# Patient Record
Sex: FEMALE | Race: WHITE | HISPANIC OR LATINO | Employment: FULL TIME | URBAN - METROPOLITAN AREA
[De-identification: names, ages, dates, MRNs, and addresses within clinical notes are randomized per-mention and may not be internally consistent; named-entity substitution may affect disease eponyms.]

---

## 2020-10-29 ENCOUNTER — HOSPITAL ENCOUNTER (INPATIENT)
Age: 55
LOS: 34 days | Discharge: ANOTHER HOSPITAL | DRG: 004 | End: 2020-12-02
Attending: EMERGENCY MEDICINE | Admitting: INTERNAL MEDICINE

## 2020-10-29 ENCOUNTER — APPOINTMENT (OUTPATIENT)
Dept: GENERAL RADIOLOGY | Age: 55
DRG: 004 | End: 2020-10-29
Attending: EMERGENCY MEDICINE

## 2020-10-29 ENCOUNTER — APPOINTMENT (OUTPATIENT)
Dept: CT IMAGING | Age: 55
DRG: 004 | End: 2020-10-29
Attending: EMERGENCY MEDICINE

## 2020-10-29 DIAGNOSIS — R73.9 HYPERGLYCEMIA: ICD-10-CM

## 2020-10-29 DIAGNOSIS — J96.01 ACUTE RESPIRATORY FAILURE WITH HYPOXIA (CMD): ICD-10-CM

## 2020-10-29 DIAGNOSIS — E11.00 DM HYPEROSMOLARITY TYPE II, UNCONTROLLED (CMD): ICD-10-CM

## 2020-10-29 DIAGNOSIS — E11.65 DM HYPEROSMOLARITY TYPE II, UNCONTROLLED (CMD): ICD-10-CM

## 2020-10-29 DIAGNOSIS — Z20.822 SUSPECTED COVID-19 VIRUS INFECTION: ICD-10-CM

## 2020-10-29 DIAGNOSIS — R79.89 ABNORMAL LFTS: ICD-10-CM

## 2020-10-29 LAB
ABO + RH BLD: NORMAL
ALBUMIN SERPL-MCNC: 2.8 G/DL (ref 3.6–5.1)
ALBUMIN/GLOB SERPL: 0.5 {RATIO} (ref 1–2.4)
ALP SERPL-CCNC: 142 UNITS/L (ref 45–117)
ALT SERPL-CCNC: 17 UNITS/L
ANION GAP SERPL CALC-SCNC: 15 MMOL/L (ref 10–20)
AST SERPL-CCNC: 46 UNITS/L
BASOPHILS # BLD: 0 K/MCL (ref 0–0.3)
BASOPHILS NFR BLD: 0 %
BILIRUB SERPL-MCNC: 0.8 MG/DL (ref 0.2–1)
BLD GP AB SCN SERPL QL GEL: NEGATIVE
BLOOD BANK CMNT PATIENT-IMP: NORMAL
BUN SERPL-MCNC: 11 MG/DL (ref 6–20)
BUN/CREAT SERPL: 21 (ref 7–25)
CALCIUM SERPL-MCNC: 8.9 MG/DL (ref 8.4–10.2)
CHLORIDE SERPL-SCNC: 99 MMOL/L (ref 98–107)
CO2 SERPL-SCNC: 24 MMOL/L (ref 21–32)
CREAT SERPL-MCNC: 0.53 MG/DL (ref 0.51–0.95)
CROSSMATCH EXPIRE: NORMAL
D DIMER PPP FEU-MCNC: 1.07 MG/L (FEU)
DIFFERENTIAL METHOD BLD: ABNORMAL
EOSINOPHIL # BLD: 0 K/MCL (ref 0.1–0.5)
EOSINOPHIL NFR BLD: 0 %
ERYTHROCYTE [DISTWIDTH] IN BLOOD: 11.8 % (ref 11–15)
FERRITIN SERPL-MCNC: 818 NG/ML (ref 8–252)
GLOBULIN SER-MCNC: 5.3 G/DL (ref 2–4)
GLUCOSE BLDC GLUCOMTR-MCNC: 320 MG/DL (ref 70–99)
GLUCOSE BLDC GLUCOMTR-MCNC: 345 MG/DL (ref 70–99)
GLUCOSE BLDC GLUCOMTR-MCNC: 349 MG/DL (ref 70–99)
GLUCOSE BLDC GLUCOMTR-MCNC: 375 MG/DL (ref 70–99)
GLUCOSE SERPL-MCNC: 415 MG/DL (ref 65–99)
HCT VFR BLD CALC: 38 % (ref 36–46.5)
HGB BLD-MCNC: 12.8 G/DL (ref 12–15.5)
IMM GRANULOCYTES # BLD AUTO: 0.1 K/MCL (ref 0–0.2)
IMM GRANULOCYTES NFR BLD: 1 %
LACTATE BLDV-MCNC: 1.4 MMOL/L
LDH SERPL L TO P-CCNC: 487 UNITS/L (ref 82–240)
LYMPHOCYTES # BLD: 0.5 K/MCL (ref 1–4)
LYMPHOCYTES NFR BLD: 11 %
MCH RBC QN AUTO: 29 PG (ref 26–34)
MCHC RBC AUTO-ENTMCNC: 33.7 G/DL (ref 32–36.5)
MCV RBC AUTO: 86 FL (ref 78–100)
MONOCYTES # BLD: 0.3 K/MCL (ref 0.3–0.9)
MONOCYTES NFR BLD: 6 %
NEUTROPHILS # BLD: 4 K/MCL (ref 1.8–7.7)
NEUTROPHILS NFR BLD: 82 %
NRBC BLD MANUAL-RTO: 0 /100 WBC
NT-PROBNP SERPL-MCNC: 103 PG/ML
PLATELET # BLD: 214 K/MCL (ref 140–450)
POTASSIUM SERPL-SCNC: 3.9 MMOL/L (ref 3.4–5.1)
PROCALCITONIN SERPL IA-MCNC: 0.15 NG/ML
PROT SERPL-MCNC: 8.1 G/DL (ref 6.4–8.2)
RBC # BLD: 4.42 MIL/MCL (ref 4–5.2)
SODIUM SERPL-SCNC: 134 MMOL/L (ref 135–145)
TROPONIN I SERPL HS-MCNC: <0.02 NG/ML
WBC # BLD: 4.9 K/MCL (ref 4.2–11)

## 2020-10-29 PROCEDURE — U0003 INFECTIOUS AGENT DETECTION BY NUCLEIC ACID (DNA OR RNA); SEVERE ACUTE RESPIRATORY SYNDROME CORONAVIRUS 2 (SARS-COV-2) (CORONAVIRUS DISEASE [COVID-19]), AMPLIFIED PROBE TECHNIQUE, MAKING USE OF HIGH THROUGHPUT TECHNOLOGIES AS DESCRIBED BY CMS-2020-01-R: HCPCS

## 2020-10-29 PROCEDURE — 96361 HYDRATE IV INFUSION ADD-ON: CPT

## 2020-10-29 PROCEDURE — 85025 COMPLETE CBC W/AUTO DIFF WBC: CPT

## 2020-10-29 PROCEDURE — 71045 X-RAY EXAM CHEST 1 VIEW: CPT

## 2020-10-29 PROCEDURE — 10002019 HB COUNTER RESP ASSESSMENT

## 2020-10-29 PROCEDURE — 87040 BLOOD CULTURE FOR BACTERIA: CPT

## 2020-10-29 PROCEDURE — 99223 1ST HOSP IP/OBS HIGH 75: CPT | Performed by: INTERNAL MEDICINE

## 2020-10-29 PROCEDURE — 87899 AGENT NOS ASSAY W/OPTIC: CPT

## 2020-10-29 PROCEDURE — 10004281 HB COUNTER-STAFF TIME PER 15 MIN

## 2020-10-29 PROCEDURE — 84145 PROCALCITONIN (PCT): CPT

## 2020-10-29 PROCEDURE — 10002805 HB CONTRAST AGENT: Performed by: EMERGENCY MEDICINE

## 2020-10-29 PROCEDURE — 83615 LACTATE (LD) (LDH) ENZYME: CPT

## 2020-10-29 PROCEDURE — 13003292 HB HHF OXYGEN THERAPY DAILY

## 2020-10-29 PROCEDURE — 82728 ASSAY OF FERRITIN: CPT

## 2020-10-29 PROCEDURE — 10002800 HB RX 250 W HCPCS: Performed by: INTERNAL MEDICINE

## 2020-10-29 PROCEDURE — 96374 THER/PROPH/DIAG INJ IV PUSH: CPT

## 2020-10-29 PROCEDURE — 84484 ASSAY OF TROPONIN QUANT: CPT

## 2020-10-29 PROCEDURE — 10002807 HB RX 258: Performed by: INTERNAL MEDICINE

## 2020-10-29 PROCEDURE — 80053 COMPREHEN METABOLIC PANEL: CPT

## 2020-10-29 PROCEDURE — 10004651 HB RX, NO CHARGE ITEM: Performed by: INTERNAL MEDICINE

## 2020-10-29 PROCEDURE — 82962 GLUCOSE BLOOD TEST: CPT

## 2020-10-29 PROCEDURE — 83605 ASSAY OF LACTIC ACID: CPT

## 2020-10-29 PROCEDURE — 85379 FIBRIN DEGRADATION QUANT: CPT

## 2020-10-29 PROCEDURE — 93005 ELECTROCARDIOGRAM TRACING: CPT | Performed by: EMERGENCY MEDICINE

## 2020-10-29 PROCEDURE — 10002803 HB RX 637: Performed by: INTERNAL MEDICINE

## 2020-10-29 PROCEDURE — XW033E5 INTRODUCTION OF REMDESIVIR ANTI-INFECTIVE INTO PERIPHERAL VEIN, PERCUTANEOUS APPROACH, NEW TECHNOLOGY GROUP 5: ICD-10-PCS | Performed by: INTERNAL MEDICINE

## 2020-10-29 PROCEDURE — 10002800 HB RX 250 W HCPCS: Performed by: EMERGENCY MEDICINE

## 2020-10-29 PROCEDURE — 99285 EMERGENCY DEPT VISIT HI MDM: CPT

## 2020-10-29 PROCEDURE — C9803 HOPD COVID-19 SPEC COLLECT: HCPCS

## 2020-10-29 PROCEDURE — 82306 VITAMIN D 25 HYDROXY: CPT

## 2020-10-29 PROCEDURE — 10002807 HB RX 258: Performed by: EMERGENCY MEDICINE

## 2020-10-29 PROCEDURE — 83880 ASSAY OF NATRIURETIC PEPTIDE: CPT

## 2020-10-29 PROCEDURE — 86900 BLOOD TYPING SEROLOGIC ABO: CPT

## 2020-10-29 PROCEDURE — 71275 CT ANGIOGRAPHY CHEST: CPT

## 2020-10-29 PROCEDURE — 10003585 HB ROOM CHARGE INTERMEDIATE CARE

## 2020-10-29 RX ORDER — DEXTROSE MONOHYDRATE 50 MG/ML
INJECTION, SOLUTION INTRAVENOUS CONTINUOUS PRN
Status: DISCONTINUED | OUTPATIENT
Start: 2020-10-29 | End: 2020-11-21

## 2020-10-29 RX ORDER — POTASSIUM CHLORIDE 20 MEQ/1
40 TABLET, EXTENDED RELEASE ORAL ONCE
Status: COMPLETED | OUTPATIENT
Start: 2020-10-29 | End: 2020-10-29

## 2020-10-29 RX ORDER — DEXAMETHASONE SODIUM PHOSPHATE 4 MG/ML
6 INJECTION, SOLUTION INTRA-ARTICULAR; INTRALESIONAL; INTRAMUSCULAR; INTRAVENOUS; SOFT TISSUE ONCE
Status: COMPLETED | OUTPATIENT
Start: 2020-10-29 | End: 2020-10-29

## 2020-10-29 RX ORDER — 0.9 % SODIUM CHLORIDE 0.9 %
2 VIAL (ML) INJECTION EVERY 12 HOURS SCHEDULED
Status: DISCONTINUED | OUTPATIENT
Start: 2020-10-29 | End: 2020-12-02 | Stop reason: HOSPADM

## 2020-10-29 RX ORDER — FAMOTIDINE 20 MG/1
20 TABLET, FILM COATED ORAL EVERY 12 HOURS SCHEDULED
Status: DISCONTINUED | OUTPATIENT
Start: 2020-10-29 | End: 2020-11-08

## 2020-10-29 RX ORDER — TRAMADOL HYDROCHLORIDE 50 MG/1
50 TABLET ORAL EVERY 6 HOURS PRN
Status: DISCONTINUED | OUTPATIENT
Start: 2020-10-29 | End: 2020-11-19

## 2020-10-29 RX ORDER — PROCHLORPERAZINE MALEATE 5 MG/1
5 TABLET ORAL EVERY 4 HOURS PRN
Status: DISCONTINUED | OUTPATIENT
Start: 2020-10-29 | End: 2020-11-19

## 2020-10-29 RX ORDER — ENOXAPARIN SODIUM 100 MG/ML
40 INJECTION SUBCUTANEOUS DAILY
Status: DISCONTINUED | OUTPATIENT
Start: 2020-10-29 | End: 2020-11-01

## 2020-10-29 RX ORDER — ONDANSETRON 2 MG/ML
4 INJECTION INTRAMUSCULAR; INTRAVENOUS 2 TIMES DAILY PRN
Status: DISCONTINUED | OUTPATIENT
Start: 2020-10-29 | End: 2020-12-02 | Stop reason: HOSPADM

## 2020-10-29 RX ORDER — NICOTINE POLACRILEX 4 MG
15 LOZENGE BUCCAL PRN
Status: DISCONTINUED | OUTPATIENT
Start: 2020-10-29 | End: 2020-11-21

## 2020-10-29 RX ORDER — BISACODYL 10 MG
10 SUPPOSITORY, RECTAL RECTAL DAILY PRN
Status: DISCONTINUED | OUTPATIENT
Start: 2020-10-29 | End: 2020-12-02 | Stop reason: HOSPADM

## 2020-10-29 RX ORDER — INSULIN LISPRO 100 [IU]/ML
7 INJECTION, SOLUTION INTRAVENOUS; SUBCUTANEOUS ONCE
Status: COMPLETED | OUTPATIENT
Start: 2020-10-29 | End: 2020-10-29

## 2020-10-29 RX ORDER — DEXTROSE MONOHYDRATE 25 G/50ML
25 INJECTION, SOLUTION INTRAVENOUS PRN
Status: DISCONTINUED | OUTPATIENT
Start: 2020-10-29 | End: 2020-11-21

## 2020-10-29 RX ORDER — POLYETHYLENE GLYCOL 3350 17 G/17G
17 POWDER, FOR SOLUTION ORAL DAILY PRN
Status: DISCONTINUED | OUTPATIENT
Start: 2020-10-29 | End: 2020-11-07

## 2020-10-29 RX ORDER — NICOTINE POLACRILEX 4 MG
30 LOZENGE BUCCAL PRN
Status: DISCONTINUED | OUTPATIENT
Start: 2020-10-29 | End: 2020-11-21

## 2020-10-29 RX ORDER — ACETAMINOPHEN 325 MG/1
650 TABLET ORAL EVERY 4 HOURS PRN
Status: DISCONTINUED | OUTPATIENT
Start: 2020-10-29 | End: 2020-11-19

## 2020-10-29 RX ORDER — INSULIN LISPRO 100 [IU]/ML
5 INJECTION, SOLUTION INTRAVENOUS; SUBCUTANEOUS ONCE
Status: COMPLETED | OUTPATIENT
Start: 2020-10-29 | End: 2020-10-29

## 2020-10-29 RX ORDER — AMOXICILLIN 250 MG
2 CAPSULE ORAL DAILY PRN
Status: DISCONTINUED | OUTPATIENT
Start: 2020-10-29 | End: 2020-11-19

## 2020-10-29 RX ORDER — ALBUTEROL SULFATE 2.5 MG/3ML
2.5 SOLUTION RESPIRATORY (INHALATION)
Status: DISCONTINUED | OUTPATIENT
Start: 2020-10-29 | End: 2020-10-29

## 2020-10-29 RX ORDER — DEXAMETHASONE SODIUM PHOSPHATE 4 MG/ML
6 INJECTION, SOLUTION INTRA-ARTICULAR; INTRALESIONAL; INTRAMUSCULAR; INTRAVENOUS; SOFT TISSUE DAILY
Status: COMPLETED | OUTPATIENT
Start: 2020-10-30 | End: 2020-11-07

## 2020-10-29 RX ORDER — MAGNESIUM HYDROXIDE/ALUMINUM HYDROXICE/SIMETHICONE 120; 1200; 1200 MG/30ML; MG/30ML; MG/30ML
30 SUSPENSION ORAL EVERY 4 HOURS PRN
Status: DISCONTINUED | OUTPATIENT
Start: 2020-10-29 | End: 2020-11-19

## 2020-10-29 RX ORDER — DEXTROSE MONOHYDRATE 25 G/50ML
12.5 INJECTION, SOLUTION INTRAVENOUS PRN
Status: DISCONTINUED | OUTPATIENT
Start: 2020-10-29 | End: 2020-11-21

## 2020-10-29 RX ORDER — ALBUTEROL SULFATE 90 UG/1
2 AEROSOL, METERED RESPIRATORY (INHALATION)
Status: DISCONTINUED | OUTPATIENT
Start: 2020-10-29 | End: 2020-11-25

## 2020-10-29 RX ADMIN — SODIUM CHLORIDE 25 ML: 0.9 INJECTION, SOLUTION INTRAVENOUS at 17:15

## 2020-10-29 RX ADMIN — INSULIN LISPRO 7 UNITS: 100 INJECTION, SOLUTION INTRAVENOUS; SUBCUTANEOUS at 15:54

## 2020-10-29 RX ADMIN — FAMOTIDINE 20 MG: 20 TABLET, FILM COATED ORAL at 20:51

## 2020-10-29 RX ADMIN — DEXAMETHASONE SODIUM PHOSPHATE 6 MG: 4 INJECTION, SOLUTION INTRAMUSCULAR; INTRAVENOUS at 11:38

## 2020-10-29 RX ADMIN — INSULIN LISPRO 4 UNITS: 100 INJECTION, SOLUTION INTRAVENOUS; SUBCUTANEOUS at 16:42

## 2020-10-29 RX ADMIN — SODIUM CHLORIDE, PRESERVATIVE FREE 2 ML: 5 INJECTION INTRAVENOUS at 20:51

## 2020-10-29 RX ADMIN — AZITHROMYCIN DIHYDRATE 500 MG: 500 INJECTION, POWDER, LYOPHILIZED, FOR SOLUTION INTRAVENOUS at 18:37

## 2020-10-29 RX ADMIN — INSULIN LISPRO 5 UNITS: 100 INJECTION, SOLUTION INTRAVENOUS; SUBCUTANEOUS at 22:30

## 2020-10-29 RX ADMIN — POTASSIUM CHLORIDE 40 MEQ: 1500 TABLET, EXTENDED RELEASE ORAL at 17:16

## 2020-10-29 RX ADMIN — SODIUM CHLORIDE, SODIUM LACTATE, POTASSIUM CHLORIDE, AND CALCIUM CHLORIDE 1000 ML: .6; .31; .03; .02 INJECTION, SOLUTION INTRAVENOUS at 11:38

## 2020-10-29 RX ADMIN — IOHEXOL 100 ML: 350 INJECTION, SOLUTION INTRAVENOUS at 12:00

## 2020-10-29 RX ADMIN — ENOXAPARIN SODIUM 40 MG: 40 INJECTION SUBCUTANEOUS at 16:00

## 2020-10-29 RX ADMIN — SODIUM CHLORIDE, PRESERVATIVE FREE 2 ML: 5 INJECTION INTRAVENOUS at 15:52

## 2020-10-29 ASSESSMENT — COGNITIVE AND FUNCTIONAL STATUS - GENERAL
ARE YOU BLIND OR DO YOU HAVE SERIOUS DIFFICULTY SEEING, EVEN WHEN WEARING GLASSES: NO
DO YOU HAVE DIFFICULTY DRESSING OR BATHING: NO
ARE YOU DEAF OR DO YOU HAVE SERIOUS DIFFICULTY  HEARING: NO
BECAUSE OF A PHYSICAL, MENTAL, OR EMOTIONAL CONDITION, DO YOU HAVE SERIOUS DIFFICULTY CONCENTRATING, REMEMBERING OR MAKING DECISIONS: NO
DO YOU HAVE SERIOUS DIFFICULTY WALKING OR CLIMBING STAIRS: NO
BECAUSE OF A PHYSICAL, MENTAL, OR EMOTIONAL CONDITION, DO YOU HAVE DIFFICULTY DOING ERRANDS ALONE: NO

## 2020-10-29 ASSESSMENT — ENCOUNTER SYMPTOMS
FATIGUE: 1
BRUISES/BLEEDS EASILY: 0
ACTIVITY CHANGE: 1
CONFUSION: 0
NAUSEA: 0
ADENOPATHY: 0
BACK PAIN: 0
ABDOMINAL PAIN: 0
DIAPHORESIS: 1
COUGH: 1
EYES NEGATIVE: 1
FEVER: 1
GASTROINTESTINAL NEGATIVE: 1
NEUROLOGICAL NEGATIVE: 1
DIARRHEA: 0
WOUND: 0
PSYCHIATRIC NEGATIVE: 1
WEAKNESS: 0
VOMITING: 0
SHORTNESS OF BREATH: 1
HEADACHES: 0

## 2020-10-29 ASSESSMENT — PATIENT HEALTH QUESTIONNAIRE - PHQ9
IS PATIENT ABLE TO COMPLETE PHQ2 OR PHQ9: YES
SUM OF ALL RESPONSES TO PHQ9 QUESTIONS 1 AND 2: 0
2. FEELING DOWN, DEPRESSED OR HOPELESS: NOT AT ALL
SUM OF ALL RESPONSES TO PHQ9 QUESTIONS 1 AND 2: 0
CLINICAL INTERPRETATION OF PHQ2 SCORE: NO FURTHER SCREENING NEEDED
1. LITTLE INTEREST OR PLEASURE IN DOING THINGS: NOT AT ALL
CLINICAL INTERPRETATION OF PHQ9 SCORE: NO FURTHER SCREENING NEEDED

## 2020-10-29 ASSESSMENT — LIFESTYLE VARIABLES
AUDIT-C TOTAL SCORE: 1
HOW OFTEN DO YOU HAVE 6 OR MORE DRINKS ON ONE OCCASION: NEVER
ALCOHOL_USE_STATUS: NO OR LOW RISK WITH VALIDATED TOOL
HOW MANY STANDARD DRINKS CONTAINING ALCOHOL DO YOU HAVE ON A TYPICAL DAY: 0,1 OR 2
HOW OFTEN DO YOU HAVE A DRINK CONTAINING ALCOHOL: MONTHLY OR LESS

## 2020-10-29 ASSESSMENT — PAIN SCALES - GENERAL
PAINLEVEL_OUTOF10: 0

## 2020-10-29 ASSESSMENT — PULMONARY FUNCTION TESTS
FEV1: 0
FEV1: 0
FEV1_PREDICTED: 0
FEV1/FVC: UNABLE TO OBTAIN, OR GREATER THAN 70%
FEV1_PREDICTED: 0
FEV1: 0

## 2020-10-29 ASSESSMENT — ACTIVITIES OF DAILY LIVING (ADL)
RECENT_DECLINE_ADL: NO
CHRONIC_PAIN_PRESENT: NO
ADL_BEFORE_ADMISSION: INDEPENDENT
ADL_SCORE: 12
ADL_SHORT_OF_BREATH: YES

## 2020-10-29 ASSESSMENT — COLUMBIA-SUICIDE SEVERITY RATING SCALE - C-SSRS
6. HAVE YOU EVER DONE ANYTHING, STARTED TO DO ANYTHING, OR PREPARED TO DO ANYTHING TO END YOUR LIFE?: NO
1. WITHIN THE PAST MONTH, HAVE YOU WISHED YOU WERE DEAD OR WISHED YOU COULD GO TO SLEEP AND NOT WAKE UP?: NO
IS THE PATIENT ABLE TO COMPLETE C-SSRS: YES
2. HAVE YOU ACTUALLY HAD ANY THOUGHTS OF KILLING YOURSELF?: NO

## 2020-10-30 ENCOUNTER — CASE MANAGEMENT (OUTPATIENT)
Dept: CARE COORDINATION | Age: 55
End: 2020-10-30

## 2020-10-30 LAB
25(OH)D3+25(OH)D2 SERPL-MCNC: 22.5 NG/ML (ref 30–100)
ATRIAL RATE (BPM): 99
FERRITIN SERPL-MCNC: 904 NG/ML (ref 8–252)
GLUCOSE BLDC GLUCOMTR-MCNC: 253 MG/DL (ref 70–99)
GLUCOSE BLDC GLUCOMTR-MCNC: 255 MG/DL (ref 70–99)
GLUCOSE BLDC GLUCOMTR-MCNC: 328 MG/DL (ref 70–99)
GLUCOSE BLDC GLUCOMTR-MCNC: 330 MG/DL (ref 70–99)
GLUCOSE BLDC GLUCOMTR-MCNC: 344 MG/DL (ref 70–99)
L PNEUMO1 AG UR QL IA: NORMAL
P AXIS (DEGREES): 31
PR-INTERVAL (MSEC): 178
QRS-INTERVAL (MSEC): 70
QT-INTERVAL (MSEC): 364
QTC: 467
R AXIS (DEGREES): 1
REPORT STATUS (RPT): NORMAL
REPORT TEXT: NORMAL
SARS-COV-2 RNA RESP QL NAA+PROBE: DETECTED
SPECIMEN SOURCE: ABNORMAL
SPECIMEN SOURCE: NORMAL
T AXIS (DEGREES): 58
VENTRICULAR RATE EKG/MIN (BPM): 99

## 2020-10-30 PROCEDURE — 83615 LACTATE (LD) (LDH) ENZYME: CPT

## 2020-10-30 PROCEDURE — P9059 PLASMA, FRZ BETWEEN 8-24HOUR: HCPCS

## 2020-10-30 PROCEDURE — 10004651 HB RX, NO CHARGE ITEM: Performed by: INTERNAL MEDICINE

## 2020-10-30 PROCEDURE — 10004281 HB COUNTER-STAFF TIME PER 15 MIN

## 2020-10-30 PROCEDURE — 10002803 HB RX 637: Performed by: INTERNAL MEDICINE

## 2020-10-30 PROCEDURE — 10003585 HB ROOM CHARGE INTERMEDIATE CARE

## 2020-10-30 PROCEDURE — 10002807 HB RX 258: Performed by: INTERNAL MEDICINE

## 2020-10-30 PROCEDURE — XW13325 TRANSFUSION OF CONVALESCENT PLASMA (NONAUTOLOGOUS) INTO PERIPHERAL VEIN, PERCUTANEOUS APPROACH, NEW TECHNOLOGY GROUP 5: ICD-10-PCS | Performed by: INTERNAL MEDICINE

## 2020-10-30 PROCEDURE — XW033H5 INTRODUCTION OF TOCILIZUMAB INTO PERIPHERAL VEIN, PERCUTANEOUS APPROACH, NEW TECHNOLOGY GROUP 5: ICD-10-PCS | Performed by: INTERNAL MEDICINE

## 2020-10-30 PROCEDURE — 84460 ALANINE AMINO (ALT) (SGPT): CPT

## 2020-10-30 PROCEDURE — 82306 VITAMIN D 25 HYDROXY: CPT

## 2020-10-30 PROCEDURE — 85379 FIBRIN DEGRADATION QUANT: CPT

## 2020-10-30 PROCEDURE — 85025 COMPLETE CBC W/AUTO DIFF WBC: CPT

## 2020-10-30 PROCEDURE — 86480 TB TEST CELL IMMUN MEASURE: CPT

## 2020-10-30 PROCEDURE — 85610 PROTHROMBIN TIME: CPT

## 2020-10-30 PROCEDURE — 83036 HEMOGLOBIN GLYCOSYLATED A1C: CPT

## 2020-10-30 PROCEDURE — 82728 ASSAY OF FERRITIN: CPT

## 2020-10-30 PROCEDURE — C9399 UNCLASSIFIED DRUGS OR BIOLOG: HCPCS | Performed by: INTERNAL MEDICINE

## 2020-10-30 PROCEDURE — 36415 COLL VENOUS BLD VENIPUNCTURE: CPT

## 2020-10-30 PROCEDURE — 10002800 HB RX 250 W HCPCS: Performed by: INTERNAL MEDICINE

## 2020-10-30 PROCEDURE — 80048 BASIC METABOLIC PNL TOTAL CA: CPT

## 2020-10-30 PROCEDURE — 83520 IMMUNOASSAY QUANT NOS NONAB: CPT

## 2020-10-30 PROCEDURE — 99233 SBSQ HOSP IP/OBS HIGH 50: CPT | Performed by: INTERNAL MEDICINE

## 2020-10-30 PROCEDURE — 86140 C-REACTIVE PROTEIN: CPT

## 2020-10-30 PROCEDURE — 13003292 HB HHF OXYGEN THERAPY DAILY

## 2020-10-30 RX ORDER — SODIUM CHLORIDE 9 MG/ML
INJECTION, SOLUTION INTRAVENOUS CONTINUOUS PRN
Status: DISCONTINUED | OUTPATIENT
Start: 2020-10-30 | End: 2020-12-02

## 2020-10-30 RX ORDER — INSULIN LISPRO 100 [IU]/ML
7 INJECTION, SOLUTION INTRAVENOUS; SUBCUTANEOUS ONCE
Status: COMPLETED | OUTPATIENT
Start: 2020-10-30 | End: 2020-10-30

## 2020-10-30 RX ORDER — VITAMIN B COMPLEX
50 TABLET ORAL DAILY
Status: DISCONTINUED | OUTPATIENT
Start: 2020-10-30 | End: 2020-10-30

## 2020-10-30 RX ORDER — INSULIN GLARGINE 100 [IU]/ML
10 INJECTION, SOLUTION SUBCUTANEOUS DAILY
Status: DISCONTINUED | OUTPATIENT
Start: 2020-10-30 | End: 2020-10-31

## 2020-10-30 RX ORDER — ERGOCALCIFEROL 1.25 MG/1
1.25 CAPSULE ORAL ONCE
Status: COMPLETED | OUTPATIENT
Start: 2020-10-30 | End: 2020-10-30

## 2020-10-30 RX ADMIN — AZITHROMYCIN DIHYDRATE 500 MG: 500 INJECTION, POWDER, LYOPHILIZED, FOR SOLUTION INTRAVENOUS at 17:03

## 2020-10-30 RX ADMIN — INSULIN LISPRO 8 UNITS: 100 INJECTION, SOLUTION INTRAVENOUS; SUBCUTANEOUS at 13:03

## 2020-10-30 RX ADMIN — ENOXAPARIN SODIUM 40 MG: 40 INJECTION SUBCUTANEOUS at 16:56

## 2020-10-30 RX ADMIN — FAMOTIDINE 20 MG: 20 TABLET, FILM COATED ORAL at 08:24

## 2020-10-30 RX ADMIN — INSULIN GLARGINE 10 UNITS: 100 INJECTION, SOLUTION SUBCUTANEOUS at 15:10

## 2020-10-30 RX ADMIN — SODIUM CHLORIDE, PRESERVATIVE FREE 2 ML: 5 INJECTION INTRAVENOUS at 08:25

## 2020-10-30 RX ADMIN — INSULIN LISPRO 7 UNITS: 100 INJECTION, SOLUTION INTRAVENOUS; SUBCUTANEOUS at 21:34

## 2020-10-30 RX ADMIN — INSULIN LISPRO 7 UNITS: 100 INJECTION, SOLUTION INTRAVENOUS; SUBCUTANEOUS at 08:24

## 2020-10-30 RX ADMIN — ERGOCALCIFEROL 1.25 MG: 1.25 CAPSULE ORAL at 13:03

## 2020-10-30 RX ADMIN — INSULIN LISPRO 8 UNITS: 100 INJECTION, SOLUTION INTRAVENOUS; SUBCUTANEOUS at 18:02

## 2020-10-30 RX ADMIN — REMDESIVIR 100 MG: 100 INJECTION, POWDER, LYOPHILIZED, FOR SOLUTION INTRAVENOUS at 18:04

## 2020-10-30 RX ADMIN — FAMOTIDINE 20 MG: 20 TABLET, FILM COATED ORAL at 21:34

## 2020-10-30 RX ADMIN — DEXAMETHASONE SODIUM PHOSPHATE 6 MG: 4 INJECTION, SOLUTION INTRAMUSCULAR; INTRAVENOUS at 08:24

## 2020-10-30 ASSESSMENT — PAIN SCALES - GENERAL
PAINLEVEL_OUTOF10: 0
PAINLEVEL_OUTOF10: 0

## 2020-10-31 PROBLEM — E11.65 DM HYPEROSMOLARITY TYPE II, UNCONTROLLED (CMD): Status: ACTIVE | Noted: 2020-10-31

## 2020-10-31 PROBLEM — E11.00 DM HYPEROSMOLARITY TYPE II, UNCONTROLLED (CMD): Status: ACTIVE | Noted: 2020-10-31

## 2020-10-31 LAB
25(OH)D3+25(OH)D2 SERPL-MCNC: 23.1 NG/ML (ref 30–100)
ALT SERPL-CCNC: 14 UNITS/L
ANION GAP SERPL CALC-SCNC: 11 MMOL/L (ref 10–20)
ANNOTATION COMMENT IMP: ABNORMAL
BASOPHILS # BLD: 0 K/MCL (ref 0–0.3)
BASOPHILS NFR BLD: 0 %
BUN SERPL-MCNC: 15 MG/DL (ref 6–20)
BUN/CREAT SERPL: 38 (ref 7–25)
CALCIUM SERPL-MCNC: 8.7 MG/DL (ref 8.4–10.2)
CHLORIDE SERPL-SCNC: 102 MMOL/L (ref 98–107)
CO2 SERPL-SCNC: 30 MMOL/L (ref 21–32)
CREAT SERPL-MCNC: 0.4 MG/DL (ref 0.51–0.95)
DIFFERENTIAL METHOD BLD: ABNORMAL
EOSINOPHIL # BLD: 0 K/MCL (ref 0.1–0.5)
EOSINOPHIL NFR BLD: 0 %
ERYTHROCYTE [DISTWIDTH] IN BLOOD: 11.8 % (ref 11–15)
GLUCOSE BLDC GLUCOMTR-MCNC: 241 MG/DL (ref 70–99)
GLUCOSE BLDC GLUCOMTR-MCNC: 257 MG/DL (ref 70–99)
GLUCOSE BLDC GLUCOMTR-MCNC: 278 MG/DL (ref 70–99)
GLUCOSE BLDC GLUCOMTR-MCNC: 320 MG/DL (ref 70–99)
GLUCOSE SERPL-MCNC: 249 MG/DL (ref 65–99)
HBV CORE IGG+IGM SER QL: NEGATIVE
HBV SURFACE AB SER QL: POSITIVE
HBV SURFACE AG SER QL: NEGATIVE
HCT VFR BLD CALC: 33.4 % (ref 36–46.5)
HCV AB SER QL: NEGATIVE
HGB BLD-MCNC: 11.3 G/DL (ref 12–15.5)
LYMPHOCYTES # BLD: 0.6 K/MCL (ref 1–4)
LYMPHOCYTES NFR BLD: 8 %
MCH RBC QN AUTO: 29.6 PG (ref 26–34)
MCHC RBC AUTO-ENTMCNC: 33.8 G/DL (ref 32–36.5)
MCV RBC AUTO: 87.4 FL (ref 78–100)
MONOCYTES # BLD: 0.2 K/MCL (ref 0.3–0.9)
MONOCYTES NFR BLD: 4 %
NEUTROPHILS # BLD: 3.8 K/MCL (ref 1.8–7.7)
NEUTS BAND NFR BLD: 2 % (ref 0–10)
NEUTS SEG NFR BLD: 81 %
NRBC BLD MANUAL-RTO: 0 /100 WBC
PLAT MORPH BLD: NORMAL
PLATELET # BLD: 226 K/MCL (ref 140–450)
POTASSIUM SERPL-SCNC: 3.8 MMOL/L (ref 3.4–5.1)
RBC # BLD: 3.82 MIL/MCL (ref 4–5.2)
RBC MORPH BLD: NORMAL
SODIUM SERPL-SCNC: 139 MMOL/L (ref 135–145)
VARIANT LYMPHS NFR BLD: 5 % (ref 0–5)
WBC # BLD: 4.6 K/MCL (ref 4.2–11)
WBC MORPH BLD: ABNORMAL

## 2020-10-31 PROCEDURE — 10002807 HB RX 258: Performed by: INTERNAL MEDICINE

## 2020-10-31 PROCEDURE — 99233 SBSQ HOSP IP/OBS HIGH 50: CPT | Performed by: INTERNAL MEDICINE

## 2020-10-31 PROCEDURE — 86704 HEP B CORE ANTIBODY TOTAL: CPT

## 2020-10-31 PROCEDURE — 10004651 HB RX, NO CHARGE ITEM: Performed by: INTERNAL MEDICINE

## 2020-10-31 PROCEDURE — 13003292 HB HHF OXYGEN THERAPY DAILY

## 2020-10-31 PROCEDURE — 10002800 HB RX 250 W HCPCS: Performed by: INTERNAL MEDICINE

## 2020-10-31 PROCEDURE — 84460 ALANINE AMINO (ALT) (SGPT): CPT

## 2020-10-31 PROCEDURE — 10004281 HB COUNTER-STAFF TIME PER 15 MIN

## 2020-10-31 PROCEDURE — C9399 UNCLASSIFIED DRUGS OR BIOLOG: HCPCS | Performed by: INTERNAL MEDICINE

## 2020-10-31 PROCEDURE — 10003585 HB ROOM CHARGE INTERMEDIATE CARE

## 2020-10-31 PROCEDURE — 85025 COMPLETE CBC W/AUTO DIFF WBC: CPT

## 2020-10-31 PROCEDURE — 36415 COLL VENOUS BLD VENIPUNCTURE: CPT

## 2020-10-31 PROCEDURE — 80048 BASIC METABOLIC PNL TOTAL CA: CPT

## 2020-10-31 PROCEDURE — 10002803 HB RX 637: Performed by: INTERNAL MEDICINE

## 2020-10-31 RX ORDER — INSULIN GLARGINE 100 [IU]/ML
20 INJECTION, SOLUTION SUBCUTANEOUS DAILY
Status: DISCONTINUED | OUTPATIENT
Start: 2020-10-31 | End: 2020-11-06

## 2020-10-31 RX ORDER — POTASSIUM CHLORIDE 20 MEQ/1
40 TABLET, EXTENDED RELEASE ORAL ONCE
Status: COMPLETED | OUTPATIENT
Start: 2020-10-31 | End: 2020-10-31

## 2020-10-31 RX ADMIN — AZITHROMYCIN DIHYDRATE 500 MG: 500 INJECTION, POWDER, LYOPHILIZED, FOR SOLUTION INTRAVENOUS at 18:50

## 2020-10-31 RX ADMIN — DEXAMETHASONE SODIUM PHOSPHATE 6 MG: 4 INJECTION, SOLUTION INTRAMUSCULAR; INTRAVENOUS at 08:29

## 2020-10-31 RX ADMIN — SODIUM CHLORIDE, PRESERVATIVE FREE 2 ML: 5 INJECTION INTRAVENOUS at 21:04

## 2020-10-31 RX ADMIN — REMDESIVIR 100 MG: 100 INJECTION, POWDER, LYOPHILIZED, FOR SOLUTION INTRAVENOUS at 17:40

## 2020-10-31 RX ADMIN — INSULIN LISPRO 9 UNITS: 100 INJECTION, SOLUTION INTRAVENOUS; SUBCUTANEOUS at 08:29

## 2020-10-31 RX ADMIN — POTASSIUM CHLORIDE 40 MEQ: 1500 TABLET, EXTENDED RELEASE ORAL at 08:28

## 2020-10-31 RX ADMIN — INSULIN LISPRO 10 UNITS: 100 INJECTION, SOLUTION INTRAVENOUS; SUBCUTANEOUS at 12:36

## 2020-10-31 RX ADMIN — ENOXAPARIN SODIUM 40 MG: 40 INJECTION SUBCUTANEOUS at 17:40

## 2020-10-31 RX ADMIN — INSULIN LISPRO 11 UNITS: 100 INJECTION, SOLUTION INTRAVENOUS; SUBCUTANEOUS at 17:40

## 2020-10-31 RX ADMIN — INSULIN GLARGINE 20 UNITS: 100 INJECTION, SOLUTION SUBCUTANEOUS at 10:43

## 2020-10-31 RX ADMIN — TOCILIZUMAB 400 MG: 20 INJECTION, SOLUTION, CONCENTRATE INTRAVENOUS at 01:59

## 2020-10-31 RX ADMIN — SODIUM CHLORIDE, PRESERVATIVE FREE 2 ML: 5 INJECTION INTRAVENOUS at 08:36

## 2020-10-31 RX ADMIN — FAMOTIDINE 20 MG: 20 TABLET, FILM COATED ORAL at 08:28

## 2020-10-31 RX ADMIN — FAMOTIDINE 20 MG: 20 TABLET, FILM COATED ORAL at 21:04

## 2020-10-31 ASSESSMENT — PAIN SCALES - GENERAL
PAINLEVEL_OUTOF10: 0
PAINLEVEL_OUTOF10: 0

## 2020-11-01 LAB
ALT SERPL-CCNC: 14 UNITS/L
ANION GAP SERPL CALC-SCNC: 10 MMOL/L (ref 10–20)
BASOPHILS # BLD: 0 K/MCL (ref 0–0.3)
BASOPHILS NFR BLD: 1 %
BLD PROD TYP BPU: NORMAL
BLD PROD TYP BPU: NORMAL
BLD UNIT ID BPU: NORMAL
BLD UNIT ID BPU: NORMAL
BUN SERPL-MCNC: 16 MG/DL (ref 6–20)
BUN/CREAT SERPL: 37 (ref 7–25)
CALCIUM SERPL-MCNC: 8.6 MG/DL (ref 8.4–10.2)
CHLORIDE SERPL-SCNC: 101 MMOL/L (ref 98–107)
CO2 SERPL-SCNC: 31 MMOL/L (ref 21–32)
CREAT SERPL-MCNC: 0.44 MG/DL (ref 0.51–0.95)
CRP SERPL-MCNC: 6.2 MG/DL
D DIMER PPP FEU-MCNC: 2.04 MG/L (FEU)
DIFFERENTIAL METHOD BLD: ABNORMAL
EOSINOPHIL # BLD: 0 K/MCL (ref 0.1–0.5)
EOSINOPHIL NFR BLD: 0 %
ERYTHROCYTE [DISTWIDTH] IN BLOOD: 11.7 % (ref 11–15)
FERRITIN SERPL-MCNC: 786 NG/ML (ref 8–252)
GLUCOSE BLDC GLUCOMTR-MCNC: 135 MG/DL (ref 70–99)
GLUCOSE BLDC GLUCOMTR-MCNC: 156 MG/DL (ref 70–99)
GLUCOSE BLDC GLUCOMTR-MCNC: 195 MG/DL (ref 70–99)
GLUCOSE BLDC GLUCOMTR-MCNC: 266 MG/DL (ref 70–99)
GLUCOSE SERPL-MCNC: 140 MG/DL (ref 65–99)
HCT VFR BLD CALC: 37.8 % (ref 36–46.5)
HGB BLD-MCNC: 12.9 G/DL (ref 12–15.5)
IMM GRANULOCYTES # BLD AUTO: 0.1 K/MCL (ref 0–0.2)
IMM GRANULOCYTES NFR BLD: 3 %
INR PPP: 1
LDH SERPL L TO P-CCNC: 625 UNITS/L (ref 82–240)
LYMPHOCYTES # BLD: 1.4 K/MCL (ref 1–4)
LYMPHOCYTES NFR BLD: 30 %
MCH RBC QN AUTO: 29.6 PG (ref 26–34)
MCHC RBC AUTO-ENTMCNC: 34.1 G/DL (ref 32–36.5)
MCV RBC AUTO: 86.7 FL (ref 78–100)
MONOCYTES # BLD: 0.4 K/MCL (ref 0.3–0.9)
MONOCYTES NFR BLD: 7 %
NEUTROPHILS # BLD: 2.8 K/MCL (ref 1.8–7.7)
NEUTROPHILS NFR BLD: 59 %
NRBC BLD MANUAL-RTO: 0 /100 WBC
NUM BPU REQUESTED: 2
PLATELET # BLD: 250 K/MCL (ref 140–450)
POTASSIUM SERPL-SCNC: 3.5 MMOL/L (ref 3.4–5.1)
POTASSIUM SERPL-SCNC: 4.4 MMOL/L (ref 3.4–5.1)
PROTHROMBIN TIME: 10.4 SEC (ref 9.7–11.8)
RBC # BLD: 4.36 MIL/MCL (ref 4–5.2)
SODIUM SERPL-SCNC: 139 MMOL/L (ref 135–145)
STATUS OF UNIT: NORMAL
STATUS OF UNIT: NORMAL
TRANSFUSION STATUS: NORMAL
TRANSFUSION STATUS: NORMAL
UNIT DIVISION: 0
UNIT DIVISION: 0
WBC # BLD: 4.8 K/MCL (ref 4.2–11)

## 2020-11-01 PROCEDURE — 83615 LACTATE (LD) (LDH) ENZYME: CPT

## 2020-11-01 PROCEDURE — 84132 ASSAY OF SERUM POTASSIUM: CPT

## 2020-11-01 PROCEDURE — 10002800 HB RX 250 W HCPCS: Performed by: INTERNAL MEDICINE

## 2020-11-01 PROCEDURE — 85610 PROTHROMBIN TIME: CPT

## 2020-11-01 PROCEDURE — 80048 BASIC METABOLIC PNL TOTAL CA: CPT

## 2020-11-01 PROCEDURE — 99233 SBSQ HOSP IP/OBS HIGH 50: CPT | Performed by: INTERNAL MEDICINE

## 2020-11-01 PROCEDURE — 36415 COLL VENOUS BLD VENIPUNCTURE: CPT

## 2020-11-01 PROCEDURE — 10002807 HB RX 258: Performed by: INTERNAL MEDICINE

## 2020-11-01 PROCEDURE — 10004281 HB COUNTER-STAFF TIME PER 15 MIN

## 2020-11-01 PROCEDURE — 86140 C-REACTIVE PROTEIN: CPT

## 2020-11-01 PROCEDURE — 85379 FIBRIN DEGRADATION QUANT: CPT

## 2020-11-01 PROCEDURE — 84460 ALANINE AMINO (ALT) (SGPT): CPT

## 2020-11-01 PROCEDURE — 10002803 HB RX 637: Performed by: INTERNAL MEDICINE

## 2020-11-01 PROCEDURE — C9399 UNCLASSIFIED DRUGS OR BIOLOG: HCPCS | Performed by: INTERNAL MEDICINE

## 2020-11-01 PROCEDURE — 85025 COMPLETE CBC W/AUTO DIFF WBC: CPT

## 2020-11-01 PROCEDURE — 10004651 HB RX, NO CHARGE ITEM: Performed by: INTERNAL MEDICINE

## 2020-11-01 PROCEDURE — 10003585 HB ROOM CHARGE INTERMEDIATE CARE

## 2020-11-01 PROCEDURE — 82728 ASSAY OF FERRITIN: CPT

## 2020-11-01 PROCEDURE — 13003292 HB HHF OXYGEN THERAPY DAILY

## 2020-11-01 RX ORDER — POTASSIUM CHLORIDE 20 MEQ/1
40 TABLET, EXTENDED RELEASE ORAL ONCE
Status: COMPLETED | OUTPATIENT
Start: 2020-11-01 | End: 2020-11-01

## 2020-11-01 RX ORDER — ENOXAPARIN SODIUM 100 MG/ML
40 INJECTION SUBCUTANEOUS EVERY 12 HOURS SCHEDULED
Status: DISCONTINUED | OUTPATIENT
Start: 2020-11-01 | End: 2020-11-04

## 2020-11-01 RX ORDER — VITAMIN B COMPLEX
25 TABLET ORAL DAILY
Status: DISCONTINUED | OUTPATIENT
Start: 2020-11-06 | End: 2020-11-05

## 2020-11-01 RX ADMIN — INSULIN LISPRO 8 UNITS: 100 INJECTION, SOLUTION INTRAVENOUS; SUBCUTANEOUS at 12:16

## 2020-11-01 RX ADMIN — ENOXAPARIN SODIUM 40 MG: 40 INJECTION SUBCUTANEOUS at 12:12

## 2020-11-01 RX ADMIN — FAMOTIDINE 20 MG: 20 TABLET, FILM COATED ORAL at 08:26

## 2020-11-01 RX ADMIN — INSULIN GLARGINE 20 UNITS: 100 INJECTION, SOLUTION SUBCUTANEOUS at 08:27

## 2020-11-01 RX ADMIN — SODIUM CHLORIDE, PRESERVATIVE FREE 2 ML: 5 INJECTION INTRAVENOUS at 19:50

## 2020-11-01 RX ADMIN — ENOXAPARIN SODIUM 40 MG: 40 INJECTION SUBCUTANEOUS at 19:50

## 2020-11-01 RX ADMIN — SODIUM CHLORIDE, PRESERVATIVE FREE 2 ML: 5 INJECTION INTRAVENOUS at 08:27

## 2020-11-01 RX ADMIN — FAMOTIDINE 20 MG: 20 TABLET, FILM COATED ORAL at 19:49

## 2020-11-01 RX ADMIN — DEXAMETHASONE SODIUM PHOSPHATE 6 MG: 4 INJECTION, SOLUTION INTRAMUSCULAR; INTRAVENOUS at 08:26

## 2020-11-01 RX ADMIN — INSULIN LISPRO 10 UNITS: 100 INJECTION, SOLUTION INTRAVENOUS; SUBCUTANEOUS at 17:20

## 2020-11-01 RX ADMIN — INSULIN LISPRO 7 UNITS: 100 INJECTION, SOLUTION INTRAVENOUS; SUBCUTANEOUS at 08:26

## 2020-11-01 RX ADMIN — REMDESIVIR 100 MG: 100 INJECTION, POWDER, LYOPHILIZED, FOR SOLUTION INTRAVENOUS at 17:21

## 2020-11-01 RX ADMIN — POTASSIUM CHLORIDE 40 MEQ: 1500 TABLET, EXTENDED RELEASE ORAL at 08:26

## 2020-11-01 ASSESSMENT — PAIN SCALES - GENERAL
PAINLEVEL_OUTOF10: 0
PAINLEVEL_OUTOF10: 0

## 2020-11-02 ENCOUNTER — CASE MANAGEMENT (OUTPATIENT)
Dept: CARE COORDINATION | Age: 55
End: 2020-11-02

## 2020-11-02 LAB
ALT SERPL-CCNC: 14 UNITS/L
BASOPHILS # BLD: 0 K/MCL (ref 0–0.3)
BASOPHILS NFR BLD: 0 %
DIFFERENTIAL METHOD BLD: ABNORMAL
EOSINOPHIL # BLD: 0 K/MCL (ref 0.1–0.5)
EOSINOPHIL NFR BLD: 0 %
ERYTHROCYTE [DISTWIDTH] IN BLOOD: 11.7 % (ref 11–15)
GLUCOSE BLDC GLUCOMTR-MCNC: 142 MG/DL (ref 70–99)
GLUCOSE BLDC GLUCOMTR-MCNC: 181 MG/DL (ref 70–99)
GLUCOSE BLDC GLUCOMTR-MCNC: 260 MG/DL (ref 70–99)
GLUCOSE BLDC GLUCOMTR-MCNC: 271 MG/DL (ref 70–99)
HCT VFR BLD CALC: 40.3 % (ref 36–46.5)
HGB BLD-MCNC: 13.9 G/DL (ref 12–15.5)
IMM GRANULOCYTES # BLD AUTO: 0.2 K/MCL (ref 0–0.2)
IMM GRANULOCYTES NFR BLD: 3 %
LYMPHOCYTES # BLD: 1.7 K/MCL (ref 1–4)
LYMPHOCYTES NFR BLD: 24 %
MCH RBC QN AUTO: 29.6 PG (ref 26–34)
MCHC RBC AUTO-ENTMCNC: 34.5 G/DL (ref 32–36.5)
MCV RBC AUTO: 85.7 FL (ref 78–100)
MONOCYTES # BLD: 0.4 K/MCL (ref 0.3–0.9)
MONOCYTES NFR BLD: 6 %
NEUTROPHILS # BLD: 4.6 K/MCL (ref 1.8–7.7)
NEUTROPHILS NFR BLD: 67 %
NRBC BLD MANUAL-RTO: 0 /100 WBC
PLATELET # BLD: 269 K/MCL (ref 140–450)
RBC # BLD: 4.7 MIL/MCL (ref 4–5.2)
WBC # BLD: 7 K/MCL (ref 4.2–11)

## 2020-11-02 PROCEDURE — C9399 UNCLASSIFIED DRUGS OR BIOLOG: HCPCS | Performed by: INTERNAL MEDICINE

## 2020-11-02 PROCEDURE — 36415 COLL VENOUS BLD VENIPUNCTURE: CPT

## 2020-11-02 PROCEDURE — 99233 SBSQ HOSP IP/OBS HIGH 50: CPT | Performed by: INTERNAL MEDICINE

## 2020-11-02 PROCEDURE — 10004651 HB RX, NO CHARGE ITEM: Performed by: INTERNAL MEDICINE

## 2020-11-02 PROCEDURE — 84460 ALANINE AMINO (ALT) (SGPT): CPT

## 2020-11-02 PROCEDURE — 10002803 HB RX 637: Performed by: INTERNAL MEDICINE

## 2020-11-02 PROCEDURE — 10002800 HB RX 250 W HCPCS: Performed by: INTERNAL MEDICINE

## 2020-11-02 PROCEDURE — 10003585 HB ROOM CHARGE INTERMEDIATE CARE

## 2020-11-02 PROCEDURE — 10002807 HB RX 258: Performed by: INTERNAL MEDICINE

## 2020-11-02 PROCEDURE — 10004281 HB COUNTER-STAFF TIME PER 15 MIN

## 2020-11-02 PROCEDURE — 13003292 HB HHF OXYGEN THERAPY DAILY

## 2020-11-02 PROCEDURE — 85025 COMPLETE CBC W/AUTO DIFF WBC: CPT

## 2020-11-02 RX ADMIN — INSULIN LISPRO 3 UNITS: 100 INJECTION, SOLUTION INTRAVENOUS; SUBCUTANEOUS at 13:50

## 2020-11-02 RX ADMIN — SODIUM CHLORIDE, PRESERVATIVE FREE 2 ML: 5 INJECTION INTRAVENOUS at 09:05

## 2020-11-02 RX ADMIN — REMDESIVIR 100 MG: 100 INJECTION, POWDER, LYOPHILIZED, FOR SOLUTION INTRAVENOUS at 17:44

## 2020-11-02 RX ADMIN — SODIUM CHLORIDE, PRESERVATIVE FREE 2 ML: 5 INJECTION INTRAVENOUS at 20:54

## 2020-11-02 RX ADMIN — ENOXAPARIN SODIUM 40 MG: 40 INJECTION SUBCUTANEOUS at 20:53

## 2020-11-02 RX ADMIN — INSULIN LISPRO 1 UNITS: 100 INJECTION, SOLUTION INTRAVENOUS; SUBCUTANEOUS at 09:02

## 2020-11-02 RX ADMIN — ENOXAPARIN SODIUM 40 MG: 40 INJECTION SUBCUTANEOUS at 09:01

## 2020-11-02 RX ADMIN — FAMOTIDINE 20 MG: 20 TABLET, FILM COATED ORAL at 20:52

## 2020-11-02 RX ADMIN — DEXAMETHASONE SODIUM PHOSPHATE 6 MG: 4 INJECTION, SOLUTION INTRAMUSCULAR; INTRAVENOUS at 10:37

## 2020-11-02 RX ADMIN — INSULIN LISPRO 7 UNITS: 100 INJECTION, SOLUTION INTRAVENOUS; SUBCUTANEOUS at 15:32

## 2020-11-02 RX ADMIN — INSULIN GLARGINE 20 UNITS: 100 INJECTION, SOLUTION SUBCUTANEOUS at 09:01

## 2020-11-02 RX ADMIN — INSULIN LISPRO 3 UNITS: 100 INJECTION, SOLUTION INTRAVENOUS; SUBCUTANEOUS at 18:00

## 2020-11-02 RX ADMIN — FAMOTIDINE 20 MG: 20 TABLET, FILM COATED ORAL at 09:01

## 2020-11-02 ASSESSMENT — PAIN SCALES - GENERAL
PAINLEVEL_OUTOF10: 0

## 2020-11-03 ENCOUNTER — APPOINTMENT (OUTPATIENT)
Dept: ULTRASOUND IMAGING | Age: 55
DRG: 004 | End: 2020-11-03
Attending: INTERNAL MEDICINE

## 2020-11-03 LAB
ALBUMIN SERPL-MCNC: 2.5 G/DL (ref 3.6–5.1)
ALP SERPL-CCNC: 116 UNITS/L (ref 45–117)
ALT SERPL-CCNC: 14 UNITS/L
AST SERPL-CCNC: 55 UNITS/L
BACTERIA BLD CULT: NORMAL
BACTERIA BLD CULT: NORMAL
BASOPHILS # BLD: 0 K/MCL (ref 0–0.3)
BASOPHILS NFR BLD: 0 %
BILIRUB CONJ SERPL-MCNC: 0.2 MG/DL (ref 0–0.2)
BILIRUB SERPL-MCNC: 0.9 MG/DL (ref 0.2–1)
CRP SERPL-MCNC: 2.2 MG/DL
D DIMER PPP FEU-MCNC: 4.41 MG/L (FEU)
DIFFERENTIAL METHOD BLD: ABNORMAL
EOSINOPHIL # BLD: 0.1 K/MCL (ref 0.1–0.5)
EOSINOPHIL NFR BLD: 1 %
ERYTHROCYTE [DISTWIDTH] IN BLOOD: 11.9 % (ref 11–15)
FERRITIN SERPL-MCNC: 654 NG/ML (ref 8–252)
GLUCOSE BLDC GLUCOMTR-MCNC: 132 MG/DL (ref 70–99)
GLUCOSE BLDC GLUCOMTR-MCNC: 174 MG/DL (ref 70–99)
GLUCOSE BLDC GLUCOMTR-MCNC: 194 MG/DL (ref 70–99)
GLUCOSE BLDC GLUCOMTR-MCNC: 197 MG/DL (ref 70–99)
HCT VFR BLD CALC: 37.9 % (ref 36–46.5)
HGB BLD-MCNC: 13 G/DL (ref 12–15.5)
IMM GRANULOCYTES # BLD AUTO: 0.1 K/MCL (ref 0–0.2)
IMM GRANULOCYTES NFR BLD: 1 %
INR PPP: 1.1
LDH SERPL L TO P-CCNC: 839 UNITS/L (ref 82–240)
LYMPHOCYTES # BLD: 0.8 K/MCL (ref 1–4)
LYMPHOCYTES NFR BLD: 9 %
MCH RBC QN AUTO: 29.5 PG (ref 26–34)
MCHC RBC AUTO-ENTMCNC: 34.3 G/DL (ref 32–36.5)
MCV RBC AUTO: 85.9 FL (ref 78–100)
MONOCYTES # BLD: 0.2 K/MCL (ref 0.3–0.9)
MONOCYTES NFR BLD: 2 %
NEUTROPHILS # BLD: 7.7 K/MCL (ref 1.8–7.7)
NEUTROPHILS NFR BLD: 87 %
NRBC BLD MANUAL-RTO: 0 /100 WBC
PLATELET # BLD: 256 K/MCL (ref 140–450)
PROT SERPL-MCNC: 6.4 G/DL (ref 6.4–8.2)
PROTHROMBIN TIME: 11.4 SEC (ref 9.7–11.8)
RBC # BLD: 4.41 MIL/MCL (ref 4–5.2)
REPORT STATUS (RPT): NORMAL
REPORT STATUS (RPT): NORMAL
SPECIMEN SOURCE: NORMAL
SPECIMEN SOURCE: NORMAL
WBC # BLD: 8.9 K/MCL (ref 4.2–11)

## 2020-11-03 PROCEDURE — 99233 SBSQ HOSP IP/OBS HIGH 50: CPT | Performed by: INTERNAL MEDICINE

## 2020-11-03 PROCEDURE — 10003585 HB ROOM CHARGE INTERMEDIATE CARE

## 2020-11-03 PROCEDURE — 93970 EXTREMITY STUDY: CPT

## 2020-11-03 PROCEDURE — 10004281 HB COUNTER-STAFF TIME PER 15 MIN

## 2020-11-03 PROCEDURE — 82728 ASSAY OF FERRITIN: CPT

## 2020-11-03 PROCEDURE — 80076 HEPATIC FUNCTION PANEL: CPT

## 2020-11-03 PROCEDURE — 10002803 HB RX 637: Performed by: INTERNAL MEDICINE

## 2020-11-03 PROCEDURE — 10004651 HB RX, NO CHARGE ITEM: Performed by: INTERNAL MEDICINE

## 2020-11-03 PROCEDURE — 36415 COLL VENOUS BLD VENIPUNCTURE: CPT

## 2020-11-03 PROCEDURE — 10002800 HB RX 250 W HCPCS: Performed by: INTERNAL MEDICINE

## 2020-11-03 PROCEDURE — 85379 FIBRIN DEGRADATION QUANT: CPT

## 2020-11-03 PROCEDURE — 85610 PROTHROMBIN TIME: CPT

## 2020-11-03 PROCEDURE — 86140 C-REACTIVE PROTEIN: CPT

## 2020-11-03 PROCEDURE — 85025 COMPLETE CBC W/AUTO DIFF WBC: CPT

## 2020-11-03 PROCEDURE — 13003292 HB HHF OXYGEN THERAPY DAILY

## 2020-11-03 PROCEDURE — 83615 LACTATE (LD) (LDH) ENZYME: CPT

## 2020-11-03 RX ADMIN — INSULIN GLARGINE 20 UNITS: 100 INJECTION, SOLUTION SUBCUTANEOUS at 08:55

## 2020-11-03 RX ADMIN — INSULIN LISPRO 8 UNITS: 100 INJECTION, SOLUTION INTRAVENOUS; SUBCUTANEOUS at 13:54

## 2020-11-03 RX ADMIN — INSULIN LISPRO 8 UNITS: 100 INJECTION, SOLUTION INTRAVENOUS; SUBCUTANEOUS at 18:03

## 2020-11-03 RX ADMIN — FAMOTIDINE 20 MG: 20 TABLET, FILM COATED ORAL at 20:00

## 2020-11-03 RX ADMIN — INSULIN LISPRO 8 UNITS: 100 INJECTION, SOLUTION INTRAVENOUS; SUBCUTANEOUS at 08:49

## 2020-11-03 RX ADMIN — SODIUM CHLORIDE, PRESERVATIVE FREE 2 ML: 5 INJECTION INTRAVENOUS at 08:49

## 2020-11-03 RX ADMIN — ENOXAPARIN SODIUM 40 MG: 40 INJECTION SUBCUTANEOUS at 08:49

## 2020-11-03 RX ADMIN — SODIUM CHLORIDE, PRESERVATIVE FREE 2 ML: 5 INJECTION INTRAVENOUS at 20:29

## 2020-11-03 RX ADMIN — ALUMINUM HYDROXIDE, MAGNESIUM HYDROXIDE, AND SIMETHICONE 30 ML: 200; 200; 20 SUSPENSION ORAL at 13:58

## 2020-11-03 RX ADMIN — FAMOTIDINE 20 MG: 20 TABLET, FILM COATED ORAL at 08:49

## 2020-11-03 RX ADMIN — ENOXAPARIN SODIUM 40 MG: 40 INJECTION SUBCUTANEOUS at 20:00

## 2020-11-03 RX ADMIN — DEXAMETHASONE SODIUM PHOSPHATE 6 MG: 4 INJECTION, SOLUTION INTRAMUSCULAR; INTRAVENOUS at 08:48

## 2020-11-03 ASSESSMENT — PAIN SCALES - GENERAL
PAINLEVEL_OUTOF10: 0
PAINLEVEL_OUTOF10: 1

## 2020-11-04 ENCOUNTER — CASE MANAGEMENT (OUTPATIENT)
Dept: CARE COORDINATION | Age: 55
End: 2020-11-04

## 2020-11-04 ENCOUNTER — APPOINTMENT (OUTPATIENT)
Dept: GENERAL RADIOLOGY | Age: 55
DRG: 004 | End: 2020-11-04
Attending: INTERNAL MEDICINE

## 2020-11-04 LAB
ALBUMIN SERPL-MCNC: 2.6 G/DL (ref 3.6–5.1)
ALP SERPL-CCNC: 119 UNITS/L (ref 45–117)
ALT SERPL-CCNC: 20 UNITS/L
ANION GAP SERPL CALC-SCNC: 9 MMOL/L (ref 10–20)
AST SERPL-CCNC: 78 UNITS/L
BASOPHILS # BLD: 0 K/MCL (ref 0–0.3)
BASOPHILS NFR BLD: 1 %
BILIRUB CONJ SERPL-MCNC: 0.2 MG/DL (ref 0–0.2)
BILIRUB SERPL-MCNC: 0.8 MG/DL (ref 0.2–1)
BUN SERPL-MCNC: 13 MG/DL (ref 6–20)
BUN/CREAT SERPL: 26 (ref 7–25)
CALCIUM SERPL-MCNC: 8.5 MG/DL (ref 8.4–10.2)
CHLORIDE SERPL-SCNC: 103 MMOL/L (ref 98–107)
CO2 SERPL-SCNC: 30 MMOL/L (ref 21–32)
CREAT SERPL-MCNC: 0.5 MG/DL (ref 0.51–0.95)
D DIMER PPP FEU-MCNC: 6.31 MG/L (FEU)
DIFFERENTIAL METHOD BLD: ABNORMAL
EOSINOPHIL # BLD: 0.2 K/MCL (ref 0.1–0.5)
EOSINOPHIL NFR BLD: 4 %
ERYTHROCYTE [DISTWIDTH] IN BLOOD: 12.1 % (ref 11–15)
GLUCOSE BLDC GLUCOMTR-MCNC: 112 MG/DL (ref 70–99)
GLUCOSE BLDC GLUCOMTR-MCNC: 152 MG/DL (ref 70–99)
GLUCOSE BLDC GLUCOMTR-MCNC: 256 MG/DL (ref 70–99)
GLUCOSE SERPL-MCNC: 85 MG/DL (ref 65–99)
HCT VFR BLD CALC: 38.7 % (ref 36–46.5)
HGB BLD-MCNC: 12.9 G/DL (ref 12–15.5)
IMM GRANULOCYTES # BLD AUTO: 0 K/MCL (ref 0–0.2)
IMM GRANULOCYTES NFR BLD: 0 %
LYMPHOCYTES # BLD: 0.7 K/MCL (ref 1–4)
LYMPHOCYTES NFR BLD: 12 %
MCH RBC QN AUTO: 28.6 PG (ref 26–34)
MCHC RBC AUTO-ENTMCNC: 33.3 G/DL (ref 32–36.5)
MCV RBC AUTO: 85.8 FL (ref 78–100)
MONOCYTES # BLD: 0.1 K/MCL (ref 0.3–0.9)
MONOCYTES NFR BLD: 2 %
NEUTROPHILS # BLD: 4.9 K/MCL (ref 1.8–7.7)
NEUTROPHILS NFR BLD: 81 %
NRBC BLD MANUAL-RTO: 0 /100 WBC
PLATELET # BLD: 251 K/MCL (ref 140–450)
POTASSIUM SERPL-SCNC: 3.3 MMOL/L (ref 3.4–5.1)
PROCALCITONIN SERPL IA-MCNC: 0.07 NG/ML
PROT SERPL-MCNC: 6.5 G/DL (ref 6.4–8.2)
RBC # BLD: 4.51 MIL/MCL (ref 4–5.2)
SODIUM SERPL-SCNC: 139 MMOL/L (ref 135–145)
WBC # BLD: 6.1 K/MCL (ref 4.2–11)

## 2020-11-04 PROCEDURE — 94660 CPAP INITIATION&MGMT: CPT

## 2020-11-04 PROCEDURE — 85379 FIBRIN DEGRADATION QUANT: CPT

## 2020-11-04 PROCEDURE — 10002800 HB RX 250 W HCPCS: Performed by: INTERNAL MEDICINE

## 2020-11-04 PROCEDURE — 5A09357 ASSISTANCE WITH RESPIRATORY VENTILATION, LESS THAN 24 CONSECUTIVE HOURS, CONTINUOUS POSITIVE AIRWAY PRESSURE: ICD-10-PCS | Performed by: INTERNAL MEDICINE

## 2020-11-04 PROCEDURE — 85025 COMPLETE CBC W/AUTO DIFF WBC: CPT

## 2020-11-04 PROCEDURE — 13003292 HB HHF OXYGEN THERAPY DAILY

## 2020-11-04 PROCEDURE — 80048 BASIC METABOLIC PNL TOTAL CA: CPT

## 2020-11-04 PROCEDURE — 99233 SBSQ HOSP IP/OBS HIGH 50: CPT | Performed by: INTERNAL MEDICINE

## 2020-11-04 PROCEDURE — 10002803 HB RX 637: Performed by: HOSPITALIST

## 2020-11-04 PROCEDURE — 10004651 HB RX, NO CHARGE ITEM: Performed by: INTERNAL MEDICINE

## 2020-11-04 PROCEDURE — 84145 PROCALCITONIN (PCT): CPT

## 2020-11-04 PROCEDURE — 10004281 HB COUNTER-STAFF TIME PER 15 MIN

## 2020-11-04 PROCEDURE — 36415 COLL VENOUS BLD VENIPUNCTURE: CPT

## 2020-11-04 PROCEDURE — C9399 UNCLASSIFIED DRUGS OR BIOLOG: HCPCS | Performed by: INTERNAL MEDICINE

## 2020-11-04 PROCEDURE — 71045 X-RAY EXAM CHEST 1 VIEW: CPT

## 2020-11-04 PROCEDURE — 10003585 HB ROOM CHARGE INTERMEDIATE CARE

## 2020-11-04 PROCEDURE — 10002807 HB RX 258: Performed by: INTERNAL MEDICINE

## 2020-11-04 PROCEDURE — 80076 HEPATIC FUNCTION PANEL: CPT

## 2020-11-04 PROCEDURE — 10002803 HB RX 637: Performed by: INTERNAL MEDICINE

## 2020-11-04 RX ORDER — POTASSIUM CHLORIDE 20 MEQ/1
40 TABLET, EXTENDED RELEASE ORAL ONCE
Status: COMPLETED | OUTPATIENT
Start: 2020-11-04 | End: 2020-11-04

## 2020-11-04 RX ORDER — ENOXAPARIN SODIUM 100 MG/ML
1 INJECTION SUBCUTANEOUS EVERY 12 HOURS SCHEDULED
Status: DISCONTINUED | OUTPATIENT
Start: 2020-11-04 | End: 2020-11-05

## 2020-11-04 RX ORDER — LANOLIN ALCOHOL/MO/W.PET/CERES
6 CREAM (GRAM) TOPICAL NIGHTLY
Status: DISCONTINUED | OUTPATIENT
Start: 2020-11-04 | End: 2020-11-19

## 2020-11-04 RX ADMIN — ENOXAPARIN SODIUM 90 MG: 100 INJECTION SUBCUTANEOUS at 20:13

## 2020-11-04 RX ADMIN — INSULIN LISPRO 10 UNITS: 100 INJECTION, SOLUTION INTRAVENOUS; SUBCUTANEOUS at 17:52

## 2020-11-04 RX ADMIN — FAMOTIDINE 20 MG: 20 TABLET, FILM COATED ORAL at 14:11

## 2020-11-04 RX ADMIN — SODIUM CHLORIDE, PRESERVATIVE FREE 2 ML: 5 INJECTION INTRAVENOUS at 20:34

## 2020-11-04 RX ADMIN — INSULIN LISPRO 8 UNITS: 100 INJECTION, SOLUTION INTRAVENOUS; SUBCUTANEOUS at 14:42

## 2020-11-04 RX ADMIN — FAMOTIDINE 20 MG: 20 TABLET, FILM COATED ORAL at 20:13

## 2020-11-04 RX ADMIN — REMDESIVIR 100 MG: 5 INJECTION INTRAVENOUS at 14:43

## 2020-11-04 RX ADMIN — ENOXAPARIN SODIUM 90 MG: 100 INJECTION SUBCUTANEOUS at 14:11

## 2020-11-04 RX ADMIN — CEFEPIME HYDROCHLORIDE 1000 MG: 1 INJECTION, POWDER, FOR SOLUTION INTRAMUSCULAR; INTRAVENOUS at 22:41

## 2020-11-04 RX ADMIN — CEFEPIME HYDROCHLORIDE 1000 MG: 1 INJECTION, POWDER, FOR SOLUTION INTRAMUSCULAR; INTRAVENOUS at 14:10

## 2020-11-04 RX ADMIN — POTASSIUM CHLORIDE 40 MEQ: 1500 TABLET, EXTENDED RELEASE ORAL at 16:00

## 2020-11-04 RX ADMIN — Medication 6 MG: at 22:20

## 2020-11-04 RX ADMIN — INSULIN GLARGINE 10 UNITS: 100 INJECTION, SOLUTION SUBCUTANEOUS at 10:31

## 2020-11-04 RX ADMIN — SODIUM CHLORIDE, PRESERVATIVE FREE 2 ML: 5 INJECTION INTRAVENOUS at 10:28

## 2020-11-04 RX ADMIN — DEXAMETHASONE SODIUM PHOSPHATE 6 MG: 4 INJECTION, SOLUTION INTRAMUSCULAR; INTRAVENOUS at 10:28

## 2020-11-04 ASSESSMENT — PAIN SCALES - GENERAL
PAINLEVEL_OUTOF10: 0
PAINLEVEL_OUTOF10: 0

## 2020-11-05 ENCOUNTER — APPOINTMENT (OUTPATIENT)
Dept: CT IMAGING | Age: 55
DRG: 004 | End: 2020-11-05
Attending: INTERNAL MEDICINE

## 2020-11-05 ENCOUNTER — APPOINTMENT (OUTPATIENT)
Dept: GENERAL RADIOLOGY | Age: 55
DRG: 004 | End: 2020-11-05
Attending: INTERNAL MEDICINE

## 2020-11-05 LAB
ANION GAP SERPL CALC-SCNC: 8 MMOL/L (ref 10–20)
APTT PPP: 123 SEC (ref 22–32)
APTT PPP: 32 SEC (ref 22–32)
APTT PPP: 34 SEC (ref 22–32)
APTT PPP: NORMAL SEC (ref 22–32)
BASOPHILS # BLD: 0 K/MCL (ref 0–0.3)
BASOPHILS NFR BLD: 1 %
BUN SERPL-MCNC: 15 MG/DL (ref 6–20)
BUN/CREAT SERPL: 29 (ref 7–25)
CALCIUM SERPL-MCNC: 8.5 MG/DL (ref 8.4–10.2)
CHLORIDE SERPL-SCNC: 103 MMOL/L (ref 98–107)
CO2 SERPL-SCNC: 28 MMOL/L (ref 21–32)
CREAT SERPL-MCNC: 0.52 MG/DL (ref 0.51–0.95)
D DIMER PPP FEU-MCNC: 15.96 MG/L (FEU)
DIFFERENTIAL METHOD BLD: ABNORMAL
EOSINOPHIL # BLD: 0.2 K/MCL (ref 0.1–0.5)
EOSINOPHIL NFR BLD: 4 %
ERYTHROCYTE [DISTWIDTH] IN BLOOD: 12.2 % (ref 11–15)
GLUCOSE BLDC GLUCOMTR-MCNC: 116 MG/DL (ref 70–99)
GLUCOSE BLDC GLUCOMTR-MCNC: 184 MG/DL (ref 70–99)
GLUCOSE BLDC GLUCOMTR-MCNC: 207 MG/DL (ref 70–99)
GLUCOSE BLDC GLUCOMTR-MCNC: 240 MG/DL (ref 70–99)
GLUCOSE SERPL-MCNC: 154 MG/DL (ref 65–99)
HCT VFR BLD CALC: 40.9 % (ref 36–46.5)
HGB BLD-MCNC: 13.9 G/DL (ref 12–15.5)
IMM GRANULOCYTES # BLD AUTO: 0 K/MCL (ref 0–0.2)
IMM GRANULOCYTES NFR BLD: 0 %
INR PPP: 1.2
LYMPHOCYTES # BLD: 0.6 K/MCL (ref 1–4)
LYMPHOCYTES NFR BLD: 10 %
MCH RBC QN AUTO: 29.3 PG (ref 26–34)
MCHC RBC AUTO-ENTMCNC: 34 G/DL (ref 32–36.5)
MCV RBC AUTO: 86.3 FL (ref 78–100)
MONOCYTES # BLD: 0.1 K/MCL (ref 0.3–0.9)
MONOCYTES NFR BLD: 2 %
NEUTROPHILS # BLD: 4.7 K/MCL (ref 1.8–7.7)
NEUTROPHILS NFR BLD: 83 %
NRBC BLD MANUAL-RTO: 0 /100 WBC
PLATELET # BLD: 235 K/MCL (ref 140–450)
POTASSIUM SERPL-SCNC: 4.5 MMOL/L (ref 3.4–5.1)
PROTHROMBIN TIME: 12.6 SEC (ref 9.7–11.8)
RBC # BLD: 4.74 MIL/MCL (ref 4–5.2)
SODIUM SERPL-SCNC: 135 MMOL/L (ref 135–145)
WBC # BLD: 5.7 K/MCL (ref 4.2–11)

## 2020-11-05 PROCEDURE — 10002800 HB RX 250 W HCPCS: Performed by: INTERNAL MEDICINE

## 2020-11-05 PROCEDURE — 10002803 HB RX 637: Performed by: INTERNAL MEDICINE

## 2020-11-05 PROCEDURE — 94660 CPAP INITIATION&MGMT: CPT

## 2020-11-05 PROCEDURE — 85025 COMPLETE CBC W/AUTO DIFF WBC: CPT

## 2020-11-05 PROCEDURE — 85379 FIBRIN DEGRADATION QUANT: CPT

## 2020-11-05 PROCEDURE — 99233 SBSQ HOSP IP/OBS HIGH 50: CPT | Performed by: INTERNAL MEDICINE

## 2020-11-05 PROCEDURE — C1751 CATH, INF, PER/CENT/MIDLINE: HCPCS

## 2020-11-05 PROCEDURE — 10003585 HB ROOM CHARGE INTERMEDIATE CARE

## 2020-11-05 PROCEDURE — 10006023 HB SUPPLY 272

## 2020-11-05 PROCEDURE — 36573 INSJ PICC RS&I 5 YR+: CPT

## 2020-11-05 PROCEDURE — 80048 BASIC METABOLIC PNL TOTAL CA: CPT

## 2020-11-05 PROCEDURE — 10002803 HB RX 637: Performed by: HOSPITALIST

## 2020-11-05 PROCEDURE — 83520 IMMUNOASSAY QUANT NOS NONAB: CPT

## 2020-11-05 PROCEDURE — 36415 COLL VENOUS BLD VENIPUNCTURE: CPT

## 2020-11-05 PROCEDURE — C9399 UNCLASSIFIED DRUGS OR BIOLOG: HCPCS | Performed by: INTERNAL MEDICINE

## 2020-11-05 PROCEDURE — 10002800 HB RX 250 W HCPCS

## 2020-11-05 PROCEDURE — 85730 THROMBOPLASTIN TIME PARTIAL: CPT

## 2020-11-05 PROCEDURE — 71045 X-RAY EXAM CHEST 1 VIEW: CPT

## 2020-11-05 PROCEDURE — 10002807 HB RX 258: Performed by: INTERNAL MEDICINE

## 2020-11-05 PROCEDURE — 85610 PROTHROMBIN TIME: CPT

## 2020-11-05 PROCEDURE — 10004281 HB COUNTER-STAFF TIME PER 15 MIN

## 2020-11-05 PROCEDURE — 3E033GC INTRODUCTION OF OTHER THERAPEUTIC SUBSTANCE INTO PERIPHERAL VEIN, PERCUTANEOUS APPROACH: ICD-10-PCS | Performed by: INTERNAL MEDICINE

## 2020-11-05 PROCEDURE — 10004651 HB RX, NO CHARGE ITEM: Performed by: INTERNAL MEDICINE

## 2020-11-05 RX ORDER — HEPARIN SODIUM 10000 [USP'U]/100ML
INJECTION, SOLUTION INTRAVENOUS
Status: COMPLETED
Start: 2020-11-05 | End: 2020-11-05

## 2020-11-05 RX ORDER — ERGOCALCIFEROL 1.25 MG/1
1.25 CAPSULE ORAL
Status: DISCONTINUED | OUTPATIENT
Start: 2020-11-05 | End: 2020-12-02 | Stop reason: HOSPADM

## 2020-11-05 RX ORDER — HEPARIN SODIUM 10000 [USP'U]/100ML
0-40 INJECTION, SOLUTION INTRAVENOUS CONTINUOUS
Status: DISCONTINUED | OUTPATIENT
Start: 2020-11-05 | End: 2020-11-09

## 2020-11-05 RX ORDER — FUROSEMIDE 10 MG/ML
INJECTION INTRAMUSCULAR; INTRAVENOUS
Status: COMPLETED
Start: 2020-11-05 | End: 2020-11-05

## 2020-11-05 RX ORDER — 0.9 % SODIUM CHLORIDE 0.9 %
10 VIAL (ML) INJECTION PRN
Status: DISCONTINUED | OUTPATIENT
Start: 2020-11-05 | End: 2020-12-02 | Stop reason: HOSPADM

## 2020-11-05 RX ORDER — FUROSEMIDE 10 MG/ML
20 INJECTION INTRAMUSCULAR; INTRAVENOUS DAILY
Status: DISCONTINUED | OUTPATIENT
Start: 2020-11-05 | End: 2020-11-12

## 2020-11-05 RX ORDER — HEPARIN SODIUM 1000 [USP'U]/ML
7000 INJECTION, SOLUTION INTRAVENOUS; SUBCUTANEOUS PRN
Status: DISCONTINUED | OUTPATIENT
Start: 2020-11-05 | End: 2020-11-09

## 2020-11-05 RX ORDER — HEPARIN SODIUM 1000 [USP'U]/ML
40 INJECTION, SOLUTION INTRAVENOUS; SUBCUTANEOUS PRN
Status: DISCONTINUED | OUTPATIENT
Start: 2020-11-05 | End: 2020-11-09

## 2020-11-05 RX ORDER — FUROSEMIDE 10 MG/ML
20 INJECTION INTRAMUSCULAR; INTRAVENOUS DAILY
Status: DISCONTINUED | OUTPATIENT
Start: 2020-11-05 | End: 2020-11-05

## 2020-11-05 RX ADMIN — HEPARIN SODIUM 7000 UNITS: 1000 INJECTION, SOLUTION INTRAVENOUS; SUBCUTANEOUS at 13:28

## 2020-11-05 RX ADMIN — SODIUM CHLORIDE, PRESERVATIVE FREE 2 ML: 5 INJECTION INTRAVENOUS at 08:34

## 2020-11-05 RX ADMIN — ENOXAPARIN SODIUM 90 MG: 100 INJECTION SUBCUTANEOUS at 08:33

## 2020-11-05 RX ADMIN — FUROSEMIDE 20 MG: 10 INJECTION, SOLUTION INTRAMUSCULAR; INTRAVENOUS at 02:16

## 2020-11-05 RX ADMIN — CEFEPIME 2000 MG: 2 INJECTION, POWDER, FOR SOLUTION INTRAVENOUS at 20:28

## 2020-11-05 RX ADMIN — FAMOTIDINE 20 MG: 20 TABLET, FILM COATED ORAL at 08:32

## 2020-11-05 RX ADMIN — CEFEPIME HYDROCHLORIDE 1000 MG: 1 INJECTION, POWDER, FOR SOLUTION INTRAMUSCULAR; INTRAVENOUS at 05:07

## 2020-11-05 RX ADMIN — ERGOCALCIFEROL 1.25 MG: 1.25 CAPSULE ORAL at 20:21

## 2020-11-05 RX ADMIN — HEPARIN SODIUM AND DEXTROSE 18 UNITS/KG/HR: 10000; 5 INJECTION INTRAVENOUS at 13:22

## 2020-11-05 RX ADMIN — DEXAMETHASONE SODIUM PHOSPHATE 6 MG: 4 INJECTION, SOLUTION INTRAMUSCULAR; INTRAVENOUS at 08:32

## 2020-11-05 RX ADMIN — REMDESIVIR 100 MG: 5 INJECTION INTRAVENOUS at 08:34

## 2020-11-05 RX ADMIN — CEFEPIME HYDROCHLORIDE 1000 MG: 1 INJECTION, POWDER, FOR SOLUTION INTRAMUSCULAR; INTRAVENOUS at 13:09

## 2020-11-05 RX ADMIN — FAMOTIDINE 20 MG: 20 TABLET, FILM COATED ORAL at 20:22

## 2020-11-05 RX ADMIN — INSULIN GLARGINE 20 UNITS: 100 INJECTION, SOLUTION SUBCUTANEOUS at 08:33

## 2020-11-05 RX ADMIN — INSULIN LISPRO 9 UNITS: 100 INJECTION, SOLUTION INTRAVENOUS; SUBCUTANEOUS at 18:58

## 2020-11-05 RX ADMIN — Medication 6 MG: at 20:23

## 2020-11-05 RX ADMIN — INSULIN LISPRO 9 UNITS: 100 INJECTION, SOLUTION INTRAVENOUS; SUBCUTANEOUS at 13:09

## 2020-11-05 RX ADMIN — HEPARIN SODIUM 18 UNITS/KG/HR: 10000 INJECTION, SOLUTION INTRAVENOUS at 13:22

## 2020-11-05 RX ADMIN — SODIUM CHLORIDE, PRESERVATIVE FREE 2 ML: 5 INJECTION INTRAVENOUS at 20:24

## 2020-11-05 ASSESSMENT — PAIN SCALES - GENERAL
PAINLEVEL_OUTOF10: 0
PAINLEVEL_OUTOF10: 0

## 2020-11-05 ASSESSMENT — PATIENT HEALTH QUESTIONNAIRE - PHQ9: IS PATIENT ABLE TO COMPLETE PHQ2 OR PHQ9: YES

## 2020-11-06 LAB
ALBUMIN SERPL-MCNC: 2.4 G/DL (ref 3.6–5.1)
ALBUMIN/GLOB SERPL: 0.6 {RATIO} (ref 1–2.4)
ALP SERPL-CCNC: 112 UNITS/L (ref 45–117)
ALT SERPL-CCNC: 23 UNITS/L
ANION GAP SERPL CALC-SCNC: 10 MMOL/L (ref 10–20)
APTT PPP: 53 SEC (ref 22–32)
APTT PPP: 68 SEC (ref 22–32)
AST SERPL-CCNC: 70 UNITS/L
BASOPHILS # BLD: 0 K/MCL (ref 0–0.3)
BASOPHILS NFR BLD: 0 %
BILIRUB SERPL-MCNC: 0.9 MG/DL (ref 0.2–1)
BUN SERPL-MCNC: 14 MG/DL (ref 6–20)
BUN/CREAT SERPL: 30 (ref 7–25)
CALCIUM SERPL-MCNC: 8.1 MG/DL (ref 8.4–10.2)
CHLORIDE SERPL-SCNC: 102 MMOL/L (ref 98–107)
CO2 SERPL-SCNC: 27 MMOL/L (ref 21–32)
CREAT SERPL-MCNC: 0.47 MG/DL (ref 0.51–0.95)
CRP SERPL-MCNC: 4.1 MG/DL
D DIMER PPP FEU-MCNC: 10.08 MG/L (FEU)
DIFFERENTIAL METHOD BLD: ABNORMAL
EOSINOPHIL # BLD: 0.2 K/MCL (ref 0.1–0.5)
EOSINOPHIL NFR BLD: 4 %
ERYTHROCYTE [DISTWIDTH] IN BLOOD: 12.3 % (ref 11–15)
GLOBULIN SER-MCNC: 3.8 G/DL (ref 2–4)
GLUCOSE BLDC GLUCOMTR-MCNC: 236 MG/DL (ref 70–99)
GLUCOSE BLDC GLUCOMTR-MCNC: 324 MG/DL (ref 70–99)
GLUCOSE BLDC GLUCOMTR-MCNC: 94 MG/DL (ref 70–99)
GLUCOSE SERPL-MCNC: 85 MG/DL (ref 65–99)
HCT VFR BLD CALC: 38.9 % (ref 36–46.5)
HGB BLD-MCNC: 13.2 G/DL (ref 12–15.5)
IMM GRANULOCYTES # BLD AUTO: 0 K/MCL (ref 0–0.2)
IMM GRANULOCYTES NFR BLD: 1 %
LYMPHOCYTES # BLD: 0.6 K/MCL (ref 1–4)
LYMPHOCYTES NFR BLD: 15 %
MCH RBC QN AUTO: 29.1 PG (ref 26–34)
MCHC RBC AUTO-ENTMCNC: 33.9 G/DL (ref 32–36.5)
MCV RBC AUTO: 85.9 FL (ref 78–100)
MONOCYTES # BLD: 0.1 K/MCL (ref 0.3–0.9)
MONOCYTES NFR BLD: 2 %
NEUTROPHILS # BLD: 3 K/MCL (ref 1.8–7.7)
NEUTROPHILS NFR BLD: 78 %
NRBC BLD MANUAL-RTO: 0 /100 WBC
PLATELET # BLD: 203 K/MCL (ref 140–450)
POTASSIUM SERPL-SCNC: 3.7 MMOL/L (ref 3.4–5.1)
PROT SERPL-MCNC: 6.2 G/DL (ref 6.4–8.2)
RBC # BLD: 4.53 MIL/MCL (ref 4–5.2)
SODIUM SERPL-SCNC: 135 MMOL/L (ref 135–145)
WBC # BLD: 3.8 K/MCL (ref 4.2–11)

## 2020-11-06 PROCEDURE — 10002803 HB RX 637: Performed by: INTERNAL MEDICINE

## 2020-11-06 PROCEDURE — 10004281 HB COUNTER-STAFF TIME PER 15 MIN

## 2020-11-06 PROCEDURE — 86140 C-REACTIVE PROTEIN: CPT

## 2020-11-06 PROCEDURE — 10004651 HB RX, NO CHARGE ITEM: Performed by: INTERNAL MEDICINE

## 2020-11-06 PROCEDURE — 94660 CPAP INITIATION&MGMT: CPT

## 2020-11-06 PROCEDURE — 10002800 HB RX 250 W HCPCS: Performed by: INTERNAL MEDICINE

## 2020-11-06 PROCEDURE — 99232 SBSQ HOSP IP/OBS MODERATE 35: CPT | Performed by: INTERNAL MEDICINE

## 2020-11-06 PROCEDURE — 10002807 HB RX 258: Performed by: INTERNAL MEDICINE

## 2020-11-06 PROCEDURE — 85730 THROMBOPLASTIN TIME PARTIAL: CPT

## 2020-11-06 PROCEDURE — 10003585 HB ROOM CHARGE INTERMEDIATE CARE

## 2020-11-06 PROCEDURE — 80053 COMPREHEN METABOLIC PANEL: CPT

## 2020-11-06 PROCEDURE — 85379 FIBRIN DEGRADATION QUANT: CPT

## 2020-11-06 PROCEDURE — C9399 UNCLASSIFIED DRUGS OR BIOLOG: HCPCS | Performed by: INTERNAL MEDICINE

## 2020-11-06 PROCEDURE — 85025 COMPLETE CBC W/AUTO DIFF WBC: CPT

## 2020-11-06 RX ORDER — BISACODYL 5 MG/1
5 TABLET, DELAYED RELEASE ORAL DAILY PRN
Status: DISCONTINUED | OUTPATIENT
Start: 2020-11-06 | End: 2020-11-19

## 2020-11-06 RX ORDER — FUROSEMIDE 10 MG/ML
20 INJECTION INTRAMUSCULAR; INTRAVENOUS 2 TIMES DAILY
Status: DISCONTINUED | OUTPATIENT
Start: 2020-11-06 | End: 2020-11-06

## 2020-11-06 RX ORDER — POTASSIUM CHLORIDE 20 MEQ/1
40 TABLET, EXTENDED RELEASE ORAL ONCE
Status: COMPLETED | OUTPATIENT
Start: 2020-11-06 | End: 2020-11-06

## 2020-11-06 RX ADMIN — INSULIN LISPRO 4 UNITS: 100 INJECTION, SOLUTION INTRAVENOUS; SUBCUTANEOUS at 17:54

## 2020-11-06 RX ADMIN — INSULIN GLARGINE 20 UNITS: 100 INJECTION, SOLUTION SUBCUTANEOUS at 08:36

## 2020-11-06 RX ADMIN — INSULIN LISPRO 8 UNITS: 100 INJECTION, SOLUTION INTRAVENOUS; SUBCUTANEOUS at 13:18

## 2020-11-06 RX ADMIN — CEFEPIME 2000 MG: 2 INJECTION, POWDER, FOR SOLUTION INTRAVENOUS at 05:08

## 2020-11-06 RX ADMIN — FAMOTIDINE 20 MG: 20 TABLET, FILM COATED ORAL at 08:36

## 2020-11-06 RX ADMIN — HEPARIN SODIUM 15 UNITS/KG/HR: 10000 INJECTION, SOLUTION INTRAVENOUS at 11:21

## 2020-11-06 RX ADMIN — FAMOTIDINE 20 MG: 20 TABLET, FILM COATED ORAL at 21:31

## 2020-11-06 RX ADMIN — POTASSIUM CHLORIDE 40 MEQ: 1500 TABLET, EXTENDED RELEASE ORAL at 11:18

## 2020-11-06 RX ADMIN — SENNOSIDES AND DOCUSATE SODIUM 2 TABLET: 8.6; 5 TABLET ORAL at 21:32

## 2020-11-06 RX ADMIN — REMDESIVIR 100 MG: 5 INJECTION INTRAVENOUS at 08:49

## 2020-11-06 RX ADMIN — INSULIN LISPRO 6 UNITS: 100 INJECTION, SOLUTION INTRAVENOUS; SUBCUTANEOUS at 21:33

## 2020-11-06 RX ADMIN — SODIUM CHLORIDE, PRESERVATIVE FREE 2 ML: 5 INJECTION INTRAVENOUS at 08:37

## 2020-11-06 RX ADMIN — DEXAMETHASONE SODIUM PHOSPHATE 6 MG: 4 INJECTION, SOLUTION INTRAMUSCULAR; INTRAVENOUS at 08:36

## 2020-11-06 RX ADMIN — CEFEPIME 2000 MG: 2 INJECTION, POWDER, FOR SOLUTION INTRAVENOUS at 21:35

## 2020-11-06 RX ADMIN — SODIUM CHLORIDE, PRESERVATIVE FREE 2 ML: 5 INJECTION INTRAVENOUS at 21:37

## 2020-11-06 RX ADMIN — CEFEPIME 2000 MG: 2 INJECTION, POWDER, FOR SOLUTION INTRAVENOUS at 13:27

## 2020-11-06 RX ADMIN — FUROSEMIDE 20 MG: 10 INJECTION, SOLUTION INTRAMUSCULAR; INTRAVENOUS at 08:36

## 2020-11-06 ASSESSMENT — PATIENT HEALTH QUESTIONNAIRE - PHQ9: IS PATIENT ABLE TO COMPLETE PHQ2 OR PHQ9: YES

## 2020-11-06 ASSESSMENT — PAIN SCALES - GENERAL
PAINLEVEL_OUTOF10: 0
PAINLEVEL_OUTOF10: 0

## 2020-11-07 LAB
ALBUMIN SERPL-MCNC: 2.6 G/DL (ref 3.6–5.1)
ALBUMIN/GLOB SERPL: 0.6 {RATIO} (ref 1–2.4)
ALP SERPL-CCNC: 121 UNITS/L (ref 45–117)
ALT SERPL-CCNC: 23 UNITS/L
ANION GAP SERPL CALC-SCNC: 10 MMOL/L (ref 10–20)
APTT PPP: 55 SEC (ref 22–32)
AST SERPL-CCNC: 65 UNITS/L
BASOPHILS # BLD: 0 K/MCL (ref 0–0.3)
BASOPHILS NFR BLD: 1 %
BILIRUB SERPL-MCNC: 0.8 MG/DL (ref 0.2–1)
BUN SERPL-MCNC: 16 MG/DL (ref 6–20)
BUN/CREAT SERPL: 30 (ref 7–25)
CALCIUM SERPL-MCNC: 8.6 MG/DL (ref 8.4–10.2)
CHLORIDE SERPL-SCNC: 103 MMOL/L (ref 98–107)
CO2 SERPL-SCNC: 28 MMOL/L (ref 21–32)
CREAT SERPL-MCNC: 0.53 MG/DL (ref 0.51–0.95)
D DIMER PPP FEU-MCNC: 6.69 MG/L (FEU)
DIFFERENTIAL METHOD BLD: ABNORMAL
EOSINOPHIL # BLD: 0.2 K/MCL (ref 0.1–0.5)
EOSINOPHIL NFR BLD: 4 %
ERYTHROCYTE [DISTWIDTH] IN BLOOD: 12.2 % (ref 11–15)
GLOBULIN SER-MCNC: 4.1 G/DL (ref 2–4)
GLUCOSE BLDC GLUCOMTR-MCNC: 147 MG/DL (ref 70–99)
GLUCOSE BLDC GLUCOMTR-MCNC: 161 MG/DL (ref 70–99)
GLUCOSE BLDC GLUCOMTR-MCNC: 269 MG/DL (ref 70–99)
GLUCOSE BLDC GLUCOMTR-MCNC: 293 MG/DL (ref 70–99)
GLUCOSE BLDC GLUCOMTR-MCNC: 327 MG/DL (ref 70–99)
GLUCOSE BLDC GLUCOMTR-MCNC: 378 MG/DL (ref 70–99)
GLUCOSE SERPL-MCNC: 145 MG/DL (ref 65–99)
HCT VFR BLD CALC: 38.3 % (ref 36–46.5)
HGB BLD-MCNC: 12.9 G/DL (ref 12–15.5)
IL6 SERPL-MCNC: 652.8 PG/ML
IMM GRANULOCYTES # BLD AUTO: 0 K/MCL (ref 0–0.2)
IMM GRANULOCYTES NFR BLD: 1 %
LYMPHOCYTES # BLD: 0.9 K/MCL (ref 1–4)
LYMPHOCYTES NFR BLD: 15 %
MCH RBC QN AUTO: 29.1 PG (ref 26–34)
MCHC RBC AUTO-ENTMCNC: 33.7 G/DL (ref 32–36.5)
MCV RBC AUTO: 86.3 FL (ref 78–100)
MONOCYTES # BLD: 0.2 K/MCL (ref 0.3–0.9)
MONOCYTES NFR BLD: 4 %
NEUTROPHILS # BLD: 4.4 K/MCL (ref 1.8–7.7)
NEUTROPHILS NFR BLD: 75 %
NRBC BLD MANUAL-RTO: 0 /100 WBC
PLATELET # BLD: 258 K/MCL (ref 140–450)
POTASSIUM SERPL-SCNC: 4.3 MMOL/L (ref 3.4–5.1)
PROT SERPL-MCNC: 6.7 G/DL (ref 6.4–8.2)
RBC # BLD: 4.44 MIL/MCL (ref 4–5.2)
SODIUM SERPL-SCNC: 137 MMOL/L (ref 135–145)
WBC # BLD: 5.8 K/MCL (ref 4.2–11)

## 2020-11-07 PROCEDURE — 99233 SBSQ HOSP IP/OBS HIGH 50: CPT | Performed by: INTERNAL MEDICINE

## 2020-11-07 PROCEDURE — 10002803 HB RX 637: Performed by: HOSPITALIST

## 2020-11-07 PROCEDURE — 85730 THROMBOPLASTIN TIME PARTIAL: CPT

## 2020-11-07 PROCEDURE — 10002800 HB RX 250 W HCPCS: Performed by: INTERNAL MEDICINE

## 2020-11-07 PROCEDURE — 10004281 HB COUNTER-STAFF TIME PER 15 MIN

## 2020-11-07 PROCEDURE — 13001086 HB INCENTIVE SPIROMETER W INSTRUCT

## 2020-11-07 PROCEDURE — 10003585 HB ROOM CHARGE INTERMEDIATE CARE

## 2020-11-07 PROCEDURE — 10002807 HB RX 258: Performed by: INTERNAL MEDICINE

## 2020-11-07 PROCEDURE — 10002803 HB RX 637: Performed by: INTERNAL MEDICINE

## 2020-11-07 PROCEDURE — 80053 COMPREHEN METABOLIC PANEL: CPT

## 2020-11-07 PROCEDURE — 85025 COMPLETE CBC W/AUTO DIFF WBC: CPT

## 2020-11-07 PROCEDURE — 13003292 HB HHF OXYGEN THERAPY DAILY

## 2020-11-07 PROCEDURE — C9399 UNCLASSIFIED DRUGS OR BIOLOG: HCPCS | Performed by: INTERNAL MEDICINE

## 2020-11-07 PROCEDURE — 10004651 HB RX, NO CHARGE ITEM: Performed by: INTERNAL MEDICINE

## 2020-11-07 PROCEDURE — 10002016 HB COUNTER INCENTIVE SPIROMETRY

## 2020-11-07 PROCEDURE — 85379 FIBRIN DEGRADATION QUANT: CPT

## 2020-11-07 RX ORDER — INSULIN LISPRO 100 [IU]/ML
5 INJECTION, SOLUTION INTRAVENOUS; SUBCUTANEOUS ONCE
Status: DISCONTINUED | OUTPATIENT
Start: 2020-11-07 | End: 2020-11-25 | Stop reason: ALTCHOICE

## 2020-11-07 RX ORDER — POLYETHYLENE GLYCOL 3350 17 G/17G
17 POWDER, FOR SOLUTION ORAL DAILY
Status: DISCONTINUED | OUTPATIENT
Start: 2020-11-07 | End: 2020-11-09

## 2020-11-07 RX ORDER — PANTOPRAZOLE SODIUM 40 MG/1
40 TABLET, DELAYED RELEASE ORAL
Status: DISCONTINUED | OUTPATIENT
Start: 2020-11-07 | End: 2020-11-19

## 2020-11-07 RX ADMIN — HEPARIN SODIUM 15 UNITS/KG/HR: 10000 INJECTION, SOLUTION INTRAVENOUS at 05:45

## 2020-11-07 RX ADMIN — INSULIN LISPRO 8 UNITS: 100 INJECTION, SOLUTION INTRAVENOUS; SUBCUTANEOUS at 21:05

## 2020-11-07 RX ADMIN — FUROSEMIDE 20 MG: 10 INJECTION, SOLUTION INTRAMUSCULAR; INTRAVENOUS at 09:07

## 2020-11-07 RX ADMIN — CEFEPIME 2000 MG: 2 INJECTION, POWDER, FOR SOLUTION INTRAVENOUS at 12:57

## 2020-11-07 RX ADMIN — FAMOTIDINE 20 MG: 20 TABLET, FILM COATED ORAL at 21:03

## 2020-11-07 RX ADMIN — CEFEPIME 2000 MG: 2 INJECTION, POWDER, FOR SOLUTION INTRAVENOUS at 05:58

## 2020-11-07 RX ADMIN — SODIUM CHLORIDE, PRESERVATIVE FREE 2 ML: 5 INJECTION INTRAVENOUS at 21:08

## 2020-11-07 RX ADMIN — INSULIN LISPRO 10 UNITS: 100 INJECTION, SOLUTION INTRAVENOUS; SUBCUTANEOUS at 18:00

## 2020-11-07 RX ADMIN — DEXAMETHASONE SODIUM PHOSPHATE 6 MG: 4 INJECTION, SOLUTION INTRAMUSCULAR; INTRAVENOUS at 11:32

## 2020-11-07 RX ADMIN — Medication 6 MG: at 21:02

## 2020-11-07 RX ADMIN — REMDESIVIR 100 MG: 5 INJECTION INTRAVENOUS at 11:34

## 2020-11-07 RX ADMIN — INSULIN LISPRO 6 UNITS: 100 INJECTION, SOLUTION INTRAVENOUS; SUBCUTANEOUS at 12:53

## 2020-11-07 RX ADMIN — CEFEPIME 2000 MG: 2 INJECTION, POWDER, FOR SOLUTION INTRAVENOUS at 21:02

## 2020-11-07 RX ADMIN — PANTOPRAZOLE SODIUM 40 MG: 40 TABLET, DELAYED RELEASE ORAL at 12:53

## 2020-11-07 RX ADMIN — SODIUM CHLORIDE, PRESERVATIVE FREE 2 ML: 5 INJECTION INTRAVENOUS at 10:21

## 2020-11-07 ASSESSMENT — PAIN SCALES - GENERAL: PAINLEVEL_OUTOF10: 0

## 2020-11-08 LAB
ALBUMIN SERPL-MCNC: 2.6 G/DL (ref 3.6–5.1)
ALBUMIN/GLOB SERPL: 0.7 {RATIO} (ref 1–2.4)
ALP SERPL-CCNC: 111 UNITS/L (ref 45–117)
ALT SERPL-CCNC: 29 UNITS/L
ANION GAP SERPL CALC-SCNC: 11 MMOL/L (ref 10–20)
APTT PPP: 61 SEC (ref 22–32)
AST SERPL-CCNC: 57 UNITS/L
BASOPHILS # BLD: 0 K/MCL (ref 0–0.3)
BASOPHILS NFR BLD: 0 %
BILIRUB SERPL-MCNC: 0.8 MG/DL (ref 0.2–1)
BUN SERPL-MCNC: 14 MG/DL (ref 6–20)
BUN/CREAT SERPL: 30 (ref 7–25)
CALCIUM SERPL-MCNC: 8.7 MG/DL (ref 8.4–10.2)
CHLORIDE SERPL-SCNC: 101 MMOL/L (ref 98–107)
CO2 SERPL-SCNC: 27 MMOL/L (ref 21–32)
CREAT SERPL-MCNC: 0.47 MG/DL (ref 0.51–0.95)
DIFFERENTIAL METHOD BLD: ABNORMAL
EOSINOPHIL # BLD: 0.1 K/MCL (ref 0.1–0.5)
EOSINOPHIL NFR BLD: 1 %
ERYTHROCYTE [DISTWIDTH] IN BLOOD: 12.2 % (ref 11–15)
GLOBULIN SER-MCNC: 3.9 G/DL (ref 2–4)
GLUCOSE BLDC GLUCOMTR-MCNC: 147 MG/DL (ref 70–99)
GLUCOSE BLDC GLUCOMTR-MCNC: 207 MG/DL (ref 70–99)
GLUCOSE BLDC GLUCOMTR-MCNC: 223 MG/DL (ref 70–99)
GLUCOSE BLDC GLUCOMTR-MCNC: 227 MG/DL (ref 70–99)
GLUCOSE BLDC GLUCOMTR-MCNC: 243 MG/DL (ref 70–99)
GLUCOSE SERPL-MCNC: 144 MG/DL (ref 65–99)
HCT VFR BLD CALC: 36.5 % (ref 36–46.5)
HGB BLD-MCNC: 12.2 G/DL (ref 12–15.5)
IMM GRANULOCYTES # BLD AUTO: 0.1 K/MCL (ref 0–0.2)
IMM GRANULOCYTES NFR BLD: 2 %
LYMPHOCYTES # BLD: 0.9 K/MCL (ref 1–4)
LYMPHOCYTES NFR BLD: 13 %
MCH RBC QN AUTO: 28.6 PG (ref 26–34)
MCHC RBC AUTO-ENTMCNC: 33.4 G/DL (ref 32–36.5)
MCV RBC AUTO: 85.7 FL (ref 78–100)
MONOCYTES # BLD: 0.3 K/MCL (ref 0.3–0.9)
MONOCYTES NFR BLD: 4 %
NEUTROPHILS # BLD: 5.2 K/MCL (ref 1.8–7.7)
NEUTROPHILS NFR BLD: 80 %
NRBC BLD MANUAL-RTO: 0 /100 WBC
PLATELET # BLD: 239 K/MCL (ref 140–450)
POTASSIUM SERPL-SCNC: 3.8 MMOL/L (ref 3.4–5.1)
PROT SERPL-MCNC: 6.5 G/DL (ref 6.4–8.2)
RBC # BLD: 4.26 MIL/MCL (ref 4–5.2)
SODIUM SERPL-SCNC: 135 MMOL/L (ref 135–145)
WBC # BLD: 6.5 K/MCL (ref 4.2–11)

## 2020-11-08 PROCEDURE — 10002800 HB RX 250 W HCPCS: Performed by: INTERNAL MEDICINE

## 2020-11-08 PROCEDURE — 99233 SBSQ HOSP IP/OBS HIGH 50: CPT | Performed by: INTERNAL MEDICINE

## 2020-11-08 PROCEDURE — 10002803 HB RX 637: Performed by: INTERNAL MEDICINE

## 2020-11-08 PROCEDURE — 10004651 HB RX, NO CHARGE ITEM: Performed by: INTERNAL MEDICINE

## 2020-11-08 PROCEDURE — 85025 COMPLETE CBC W/AUTO DIFF WBC: CPT

## 2020-11-08 PROCEDURE — 10003585 HB ROOM CHARGE INTERMEDIATE CARE

## 2020-11-08 PROCEDURE — 10002807 HB RX 258: Performed by: INTERNAL MEDICINE

## 2020-11-08 PROCEDURE — 13003292 HB HHF OXYGEN THERAPY DAILY

## 2020-11-08 PROCEDURE — 85730 THROMBOPLASTIN TIME PARTIAL: CPT

## 2020-11-08 PROCEDURE — 10004281 HB COUNTER-STAFF TIME PER 15 MIN

## 2020-11-08 PROCEDURE — 80053 COMPREHEN METABOLIC PANEL: CPT

## 2020-11-08 PROCEDURE — C9399 UNCLASSIFIED DRUGS OR BIOLOG: HCPCS | Performed by: INTERNAL MEDICINE

## 2020-11-08 RX ORDER — POTASSIUM CHLORIDE 20 MEQ/1
40 TABLET, EXTENDED RELEASE ORAL ONCE
Status: COMPLETED | OUTPATIENT
Start: 2020-11-08 | End: 2020-11-08

## 2020-11-08 RX ORDER — INSULIN GLARGINE 100 [IU]/ML
10 INJECTION, SOLUTION SUBCUTANEOUS NIGHTLY
Status: DISCONTINUED | OUTPATIENT
Start: 2020-11-08 | End: 2020-11-10

## 2020-11-08 RX ADMIN — CEFEPIME 2000 MG: 2 INJECTION, POWDER, FOR SOLUTION INTRAVENOUS at 05:36

## 2020-11-08 RX ADMIN — CEFEPIME 2000 MG: 2 INJECTION, POWDER, FOR SOLUTION INTRAVENOUS at 21:26

## 2020-11-08 RX ADMIN — INSULIN LISPRO 4 UNITS: 100 INJECTION, SOLUTION INTRAVENOUS; SUBCUTANEOUS at 13:56

## 2020-11-08 RX ADMIN — REMDESIVIR 100 MG: 5 INJECTION INTRAVENOUS at 11:03

## 2020-11-08 RX ADMIN — PANTOPRAZOLE SODIUM 40 MG: 40 TABLET, DELAYED RELEASE ORAL at 05:35

## 2020-11-08 RX ADMIN — INSULIN LISPRO 4 UNITS: 100 INJECTION, SOLUTION INTRAVENOUS; SUBCUTANEOUS at 21:24

## 2020-11-08 RX ADMIN — CEFEPIME 2000 MG: 2 INJECTION, POWDER, FOR SOLUTION INTRAVENOUS at 15:00

## 2020-11-08 RX ADMIN — INSULIN LISPRO 4 UNITS: 100 INJECTION, SOLUTION INTRAVENOUS; SUBCUTANEOUS at 17:49

## 2020-11-08 RX ADMIN — POTASSIUM CHLORIDE 40 MEQ: 1500 TABLET, EXTENDED RELEASE ORAL at 08:20

## 2020-11-08 RX ADMIN — FUROSEMIDE 20 MG: 10 INJECTION, SOLUTION INTRAMUSCULAR; INTRAVENOUS at 08:20

## 2020-11-08 RX ADMIN — INSULIN GLARGINE 10 UNITS: 100 INJECTION, SOLUTION SUBCUTANEOUS at 21:24

## 2020-11-08 RX ADMIN — SODIUM CHLORIDE, PRESERVATIVE FREE 2 ML: 5 INJECTION INTRAVENOUS at 21:24

## 2020-11-08 RX ADMIN — SODIUM CHLORIDE, PRESERVATIVE FREE 2 ML: 5 INJECTION INTRAVENOUS at 08:22

## 2020-11-08 ASSESSMENT — PAIN SCALES - GENERAL: PAINLEVEL_OUTOF10: 0

## 2020-11-09 ENCOUNTER — CASE MANAGEMENT (OUTPATIENT)
Dept: CARE COORDINATION | Age: 55
End: 2020-11-09

## 2020-11-09 LAB
ALBUMIN SERPL-MCNC: 2.5 G/DL (ref 3.6–5.1)
ALBUMIN/GLOB SERPL: 0.7 {RATIO} (ref 1–2.4)
ALP SERPL-CCNC: 107 UNITS/L (ref 45–117)
ALT SERPL-CCNC: 28 UNITS/L
ANION GAP SERPL CALC-SCNC: 10 MMOL/L (ref 10–20)
APTT PPP: 45 SEC (ref 22–32)
AST SERPL-CCNC: 57 UNITS/L
BASOPHILS # BLD: 0.1 K/MCL (ref 0–0.3)
BASOPHILS NFR BLD: 1 %
BILIRUB SERPL-MCNC: 0.9 MG/DL (ref 0.2–1)
BUN SERPL-MCNC: 15 MG/DL (ref 6–20)
BUN/CREAT SERPL: 26 (ref 7–25)
CALCIUM SERPL-MCNC: 8.5 MG/DL (ref 8.4–10.2)
CHLORIDE SERPL-SCNC: 102 MMOL/L (ref 98–107)
CO2 SERPL-SCNC: 27 MMOL/L (ref 21–32)
CREAT SERPL-MCNC: 0.57 MG/DL (ref 0.51–0.95)
D DIMER PPP FEU-MCNC: 6.69 MG/L (FEU)
DIFFERENTIAL METHOD BLD: ABNORMAL
EOSINOPHIL # BLD: 0.3 K/MCL (ref 0.1–0.5)
EOSINOPHIL NFR BLD: 4 %
ERYTHROCYTE [DISTWIDTH] IN BLOOD: 12.4 % (ref 11–15)
GLOBULIN SER-MCNC: 3.7 G/DL (ref 2–4)
GLUCOSE BLDC GLUCOMTR-MCNC: 196 MG/DL (ref 70–99)
GLUCOSE BLDC GLUCOMTR-MCNC: 202 MG/DL (ref 70–99)
GLUCOSE BLDC GLUCOMTR-MCNC: 204 MG/DL (ref 70–99)
GLUCOSE BLDC GLUCOMTR-MCNC: 303 MG/DL (ref 70–99)
GLUCOSE SERPL-MCNC: 149 MG/DL (ref 65–99)
HCT VFR BLD CALC: 36.2 % (ref 36–46.5)
HGB BLD-MCNC: 12.3 G/DL (ref 12–15.5)
IMM GRANULOCYTES # BLD AUTO: 0.3 K/MCL (ref 0–0.2)
IMM GRANULOCYTES NFR BLD: 3 %
LYMPHOCYTES # BLD: 1.1 K/MCL (ref 1–4)
LYMPHOCYTES NFR BLD: 12 %
MCH RBC QN AUTO: 29.2 PG (ref 26–34)
MCHC RBC AUTO-ENTMCNC: 34 G/DL (ref 32–36.5)
MCV RBC AUTO: 86 FL (ref 78–100)
MONOCYTES # BLD: 0.3 K/MCL (ref 0.3–0.9)
MONOCYTES NFR BLD: 4 %
NEUTROPHILS # BLD: 7.1 K/MCL (ref 1.8–7.7)
NEUTROPHILS NFR BLD: 76 %
NRBC BLD MANUAL-RTO: 0 /100 WBC
PLATELET # BLD: 251 K/MCL (ref 140–450)
POTASSIUM SERPL-SCNC: 3.9 MMOL/L (ref 3.4–5.1)
PROT SERPL-MCNC: 6.2 G/DL (ref 6.4–8.2)
RBC # BLD: 4.21 MIL/MCL (ref 4–5.2)
SODIUM SERPL-SCNC: 135 MMOL/L (ref 135–145)
WBC # BLD: 9.2 K/MCL (ref 4.2–11)

## 2020-11-09 PROCEDURE — 10002800 HB RX 250 W HCPCS: Performed by: INTERNAL MEDICINE

## 2020-11-09 PROCEDURE — 13003292 HB HHF OXYGEN THERAPY DAILY

## 2020-11-09 PROCEDURE — 85025 COMPLETE CBC W/AUTO DIFF WBC: CPT

## 2020-11-09 PROCEDURE — 10004281 HB COUNTER-STAFF TIME PER 15 MIN

## 2020-11-09 PROCEDURE — 85379 FIBRIN DEGRADATION QUANT: CPT

## 2020-11-09 PROCEDURE — 10003585 HB ROOM CHARGE INTERMEDIATE CARE

## 2020-11-09 PROCEDURE — 10002807 HB RX 258: Performed by: INTERNAL MEDICINE

## 2020-11-09 PROCEDURE — 10004651 HB RX, NO CHARGE ITEM: Performed by: INTERNAL MEDICINE

## 2020-11-09 PROCEDURE — 85730 THROMBOPLASTIN TIME PARTIAL: CPT

## 2020-11-09 PROCEDURE — 99233 SBSQ HOSP IP/OBS HIGH 50: CPT | Performed by: INTERNAL MEDICINE

## 2020-11-09 PROCEDURE — 80053 COMPREHEN METABOLIC PANEL: CPT

## 2020-11-09 PROCEDURE — 10002803 HB RX 637: Performed by: INTERNAL MEDICINE

## 2020-11-09 RX ORDER — POTASSIUM CHLORIDE 20 MEQ/1
40 TABLET, EXTENDED RELEASE ORAL ONCE
Status: COMPLETED | OUTPATIENT
Start: 2020-11-09 | End: 2020-11-09

## 2020-11-09 RX ORDER — ENOXAPARIN SODIUM 100 MG/ML
1 INJECTION SUBCUTANEOUS EVERY 12 HOURS
Status: DISCONTINUED | OUTPATIENT
Start: 2020-11-09 | End: 2020-11-12

## 2020-11-09 RX ORDER — POLYETHYLENE GLYCOL 3350 17 G/17G
17 POWDER, FOR SOLUTION ORAL DAILY PRN
Status: DISCONTINUED | OUTPATIENT
Start: 2020-11-09 | End: 2020-12-02 | Stop reason: HOSPADM

## 2020-11-09 RX ADMIN — SODIUM CHLORIDE, PRESERVATIVE FREE 2 ML: 5 INJECTION INTRAVENOUS at 08:21

## 2020-11-09 RX ADMIN — SODIUM CHLORIDE, PRESERVATIVE FREE 2 ML: 5 INJECTION INTRAVENOUS at 21:07

## 2020-11-09 RX ADMIN — FUROSEMIDE 20 MG: 10 INJECTION, SOLUTION INTRAMUSCULAR; INTRAVENOUS at 08:19

## 2020-11-09 RX ADMIN — CEFEPIME 2000 MG: 2 INJECTION, POWDER, FOR SOLUTION INTRAVENOUS at 22:21

## 2020-11-09 RX ADMIN — ENOXAPARIN SODIUM 90 MG: 100 INJECTION SUBCUTANEOUS at 21:02

## 2020-11-09 RX ADMIN — INSULIN GLARGINE 10 UNITS: 100 INJECTION, SOLUTION SUBCUTANEOUS at 22:03

## 2020-11-09 RX ADMIN — CEFEPIME 2000 MG: 2 INJECTION, POWDER, FOR SOLUTION INTRAVENOUS at 06:27

## 2020-11-09 RX ADMIN — POTASSIUM CHLORIDE 40 MEQ: 1500 TABLET, EXTENDED RELEASE ORAL at 08:56

## 2020-11-09 RX ADMIN — PANTOPRAZOLE SODIUM 40 MG: 40 TABLET, DELAYED RELEASE ORAL at 06:27

## 2020-11-09 RX ADMIN — INSULIN LISPRO 2 UNITS: 100 INJECTION, SOLUTION INTRAVENOUS; SUBCUTANEOUS at 16:42

## 2020-11-09 RX ADMIN — INSULIN LISPRO 4 UNITS: 100 INJECTION, SOLUTION INTRAVENOUS; SUBCUTANEOUS at 11:54

## 2020-11-09 RX ADMIN — INSULIN LISPRO 8 UNITS: 100 INJECTION, SOLUTION INTRAVENOUS; SUBCUTANEOUS at 21:02

## 2020-11-09 RX ADMIN — INSULIN LISPRO 4 UNITS: 100 INJECTION, SOLUTION INTRAVENOUS; SUBCUTANEOUS at 08:24

## 2020-11-09 RX ADMIN — CEFEPIME 2000 MG: 2 INJECTION, POWDER, FOR SOLUTION INTRAVENOUS at 14:55

## 2020-11-09 ASSESSMENT — PAIN SCALES - GENERAL
PAINLEVEL_OUTOF10: 0
PAINLEVEL_OUTOF10: 0

## 2020-11-10 LAB
APTT PPP: 31 SEC (ref 22–32)
BASOPHILS # BLD: 0.1 K/MCL (ref 0–0.3)
BASOPHILS NFR BLD: 1 %
DIFFERENTIAL METHOD BLD: NORMAL
EOSINOPHIL # BLD: 0.1 K/MCL (ref 0.1–0.5)
EOSINOPHIL NFR BLD: 1 %
ERYTHROCYTE [DISTWIDTH] IN BLOOD: 12.5 % (ref 11–15)
GLUCOSE BLDC GLUCOMTR-MCNC: 155 MG/DL (ref 70–99)
GLUCOSE BLDC GLUCOMTR-MCNC: 172 MG/DL (ref 70–99)
GLUCOSE BLDC GLUCOMTR-MCNC: 245 MG/DL (ref 70–99)
GLUCOSE BLDC GLUCOMTR-MCNC: 246 MG/DL (ref 70–99)
GLUCOSE BLDC GLUCOMTR-MCNC: 287 MG/DL (ref 70–99)
HCT VFR BLD CALC: 37 % (ref 36–46.5)
HGB BLD-MCNC: 12.6 G/DL (ref 12–15.5)
LYMPHOCYTES # BLD: 1.3 K/MCL (ref 1–4)
LYMPHOCYTES NFR BLD: 17 %
MCH RBC QN AUTO: 29.5 PG (ref 26–34)
MCHC RBC AUTO-ENTMCNC: 34.1 G/DL (ref 32–36.5)
MCV RBC AUTO: 86.7 FL (ref 78–100)
METAMYELOCYTES NFR BLD: 2 % (ref 0–2)
MONOCYTES # BLD: 0.4 K/MCL (ref 0.3–0.9)
MONOCYTES NFR BLD: 5 %
NEUTROPHILS # BLD: 5.7 K/MCL (ref 1.8–7.7)
NEUTS BAND NFR BLD: 3 % (ref 0–10)
NEUTS SEG NFR BLD: 71 %
NRBC BLD MANUAL-RTO: 0 /100 WBC
PLAT MORPH BLD: NORMAL
PLATELET # BLD: 265 K/MCL (ref 140–450)
POLYCHROMASIA BLD QL SMEAR: NORMAL
POTASSIUM SERPL-SCNC: 4.2 MMOL/L (ref 3.4–5.1)
RBC # BLD: 4.27 MIL/MCL (ref 4–5.2)
WBC # BLD: 7.7 K/MCL (ref 4.2–11)
WBC MORPH BLD: NORMAL

## 2020-11-10 PROCEDURE — 84132 ASSAY OF SERUM POTASSIUM: CPT

## 2020-11-10 PROCEDURE — 10002800 HB RX 250 W HCPCS: Performed by: INTERNAL MEDICINE

## 2020-11-10 PROCEDURE — 85730 THROMBOPLASTIN TIME PARTIAL: CPT

## 2020-11-10 PROCEDURE — 10002807 HB RX 258: Performed by: INTERNAL MEDICINE

## 2020-11-10 PROCEDURE — 10004651 HB RX, NO CHARGE ITEM: Performed by: INTERNAL MEDICINE

## 2020-11-10 PROCEDURE — 10004281 HB COUNTER-STAFF TIME PER 15 MIN

## 2020-11-10 PROCEDURE — 10002803 HB RX 637: Performed by: INTERNAL MEDICINE

## 2020-11-10 PROCEDURE — 99233 SBSQ HOSP IP/OBS HIGH 50: CPT | Performed by: INTERNAL MEDICINE

## 2020-11-10 PROCEDURE — 10003585 HB ROOM CHARGE INTERMEDIATE CARE

## 2020-11-10 PROCEDURE — 85025 COMPLETE CBC W/AUTO DIFF WBC: CPT

## 2020-11-10 PROCEDURE — 13003292 HB HHF OXYGEN THERAPY DAILY

## 2020-11-10 RX ORDER — GUAIFENESIN/DEXTROMETHORPHAN 100-10MG/5
10 SYRUP ORAL EVERY 6 HOURS PRN
Status: DISCONTINUED | OUTPATIENT
Start: 2020-11-10 | End: 2020-11-19

## 2020-11-10 RX ORDER — INSULIN GLARGINE 100 [IU]/ML
14 INJECTION, SOLUTION SUBCUTANEOUS NIGHTLY
Status: DISCONTINUED | OUTPATIENT
Start: 2020-11-10 | End: 2020-11-14

## 2020-11-10 RX ADMIN — INSULIN GLARGINE 14 UNITS: 100 INJECTION, SOLUTION SUBCUTANEOUS at 20:19

## 2020-11-10 RX ADMIN — ENOXAPARIN SODIUM 90 MG: 100 INJECTION SUBCUTANEOUS at 20:18

## 2020-11-10 RX ADMIN — INSULIN LISPRO 2 UNITS: 100 INJECTION, SOLUTION INTRAVENOUS; SUBCUTANEOUS at 09:49

## 2020-11-10 RX ADMIN — INSULIN LISPRO 4 UNITS: 100 INJECTION, SOLUTION INTRAVENOUS; SUBCUTANEOUS at 20:18

## 2020-11-10 RX ADMIN — INSULIN LISPRO 6 UNITS: 100 INJECTION, SOLUTION INTRAVENOUS; SUBCUTANEOUS at 11:46

## 2020-11-10 RX ADMIN — ENOXAPARIN SODIUM 90 MG: 100 INJECTION SUBCUTANEOUS at 08:14

## 2020-11-10 RX ADMIN — CEFEPIME 2000 MG: 2 INJECTION, POWDER, FOR SOLUTION INTRAVENOUS at 05:51

## 2020-11-10 RX ADMIN — GUAIFENESIN AND DEXTROMETHORPHAN 10 ML: 100; 10 SYRUP ORAL at 12:15

## 2020-11-10 RX ADMIN — SODIUM CHLORIDE, PRESERVATIVE FREE 2 ML: 5 INJECTION INTRAVENOUS at 20:20

## 2020-11-10 RX ADMIN — PANTOPRAZOLE SODIUM 40 MG: 40 TABLET, DELAYED RELEASE ORAL at 05:45

## 2020-11-10 RX ADMIN — INSULIN LISPRO 4 UNITS: 100 INJECTION, SOLUTION INTRAVENOUS; SUBCUTANEOUS at 17:58

## 2020-11-10 RX ADMIN — SODIUM CHLORIDE, PRESERVATIVE FREE 2 ML: 5 INJECTION INTRAVENOUS at 08:17

## 2020-11-10 RX ADMIN — CEFEPIME 2000 MG: 2 INJECTION, POWDER, FOR SOLUTION INTRAVENOUS at 15:40

## 2020-11-10 RX ADMIN — FUROSEMIDE 20 MG: 10 INJECTION, SOLUTION INTRAMUSCULAR; INTRAVENOUS at 08:17

## 2020-11-10 RX ADMIN — GUAIFENESIN AND DEXTROMETHORPHAN 10 ML: 100; 10 SYRUP ORAL at 18:36

## 2020-11-10 ASSESSMENT — PAIN SCALES - GENERAL
PAINLEVEL_OUTOF10: 0
PAINLEVEL_OUTOF10: 0

## 2020-11-11 LAB
ALBUMIN SERPL-MCNC: 2.6 G/DL (ref 3.6–5.1)
ALBUMIN/GLOB SERPL: 0.6 {RATIO} (ref 1–2.4)
ALP SERPL-CCNC: 96 UNITS/L (ref 45–117)
ALT SERPL-CCNC: 20 UNITS/L
ANION GAP SERPL CALC-SCNC: 11 MMOL/L (ref 10–20)
APTT PPP: 32 SEC (ref 22–32)
AST SERPL-CCNC: 34 UNITS/L
BASOPHILS # BLD: 0 K/MCL (ref 0–0.3)
BASOPHILS NFR BLD: 0 %
BILIRUB SERPL-MCNC: 1.1 MG/DL (ref 0.2–1)
BUN SERPL-MCNC: 14 MG/DL (ref 6–20)
BUN/CREAT SERPL: 26 (ref 7–25)
CALCIUM SERPL-MCNC: 8.4 MG/DL (ref 8.4–10.2)
CHLORIDE SERPL-SCNC: 100 MMOL/L (ref 98–107)
CO2 SERPL-SCNC: 26 MMOL/L (ref 21–32)
CREAT SERPL-MCNC: 0.53 MG/DL (ref 0.51–0.95)
D DIMER PPP FEU-MCNC: 5.28 MG/L (FEU)
DIFFERENTIAL METHOD BLD: ABNORMAL
EOSINOPHIL # BLD: 0.3 K/MCL (ref 0.1–0.5)
EOSINOPHIL NFR BLD: 4 %
ERYTHROCYTE [DISTWIDTH] IN BLOOD: 12.5 % (ref 11–15)
GLOBULIN SER-MCNC: 4.2 G/DL (ref 2–4)
GLUCOSE BLDC GLUCOMTR-MCNC: 208 MG/DL (ref 70–99)
GLUCOSE BLDC GLUCOMTR-MCNC: 211 MG/DL (ref 70–99)
GLUCOSE BLDC GLUCOMTR-MCNC: 212 MG/DL (ref 70–99)
GLUCOSE BLDC GLUCOMTR-MCNC: 295 MG/DL (ref 70–99)
GLUCOSE BLDC GLUCOMTR-MCNC: 312 MG/DL (ref 70–99)
GLUCOSE SERPL-MCNC: 173 MG/DL (ref 65–99)
HCT VFR BLD CALC: 37 % (ref 36–46.5)
HGB BLD-MCNC: 12.7 G/DL (ref 12–15.5)
LYMPHOCYTES # BLD: 1.4 K/MCL (ref 1–4)
LYMPHOCYTES NFR BLD: 20 %
MAGNESIUM SERPL-MCNC: 2.3 MG/DL (ref 1.7–2.4)
MCH RBC QN AUTO: 29.3 PG (ref 26–34)
MCHC RBC AUTO-ENTMCNC: 34.3 G/DL (ref 32–36.5)
MCV RBC AUTO: 85.5 FL (ref 78–100)
MONOCYTES # BLD: 0.4 K/MCL (ref 0.3–0.9)
MONOCYTES NFR BLD: 5 %
MYELOCYTES NFR BLD: 1 %
NEUTROPHILS # BLD: 4.9 K/MCL (ref 1.8–7.7)
NEUTS BAND NFR BLD: 2 % (ref 0–10)
NEUTS SEG NFR BLD: 68 %
NRBC BLD MANUAL-RTO: 0 /100 WBC
PLAT MORPH BLD: NORMAL
PLATELET # BLD: 277 K/MCL (ref 140–450)
POLYCHROMASIA BLD QL SMEAR: ABNORMAL
POTASSIUM SERPL-SCNC: 4.2 MMOL/L (ref 3.4–5.1)
PROT SERPL-MCNC: 6.8 G/DL (ref 6.4–8.2)
RBC # BLD: 4.33 MIL/MCL (ref 4–5.2)
SODIUM SERPL-SCNC: 133 MMOL/L (ref 135–145)
WBC # BLD: 7 K/MCL (ref 4.2–11)
WBC MORPH BLD: NORMAL

## 2020-11-11 PROCEDURE — 85025 COMPLETE CBC W/AUTO DIFF WBC: CPT

## 2020-11-11 PROCEDURE — 99233 SBSQ HOSP IP/OBS HIGH 50: CPT | Performed by: INTERNAL MEDICINE

## 2020-11-11 PROCEDURE — 85379 FIBRIN DEGRADATION QUANT: CPT

## 2020-11-11 PROCEDURE — 13003292 HB HHF OXYGEN THERAPY DAILY

## 2020-11-11 PROCEDURE — 83735 ASSAY OF MAGNESIUM: CPT

## 2020-11-11 PROCEDURE — 10002800 HB RX 250 W HCPCS: Performed by: INTERNAL MEDICINE

## 2020-11-11 PROCEDURE — 85730 THROMBOPLASTIN TIME PARTIAL: CPT

## 2020-11-11 PROCEDURE — 10004651 HB RX, NO CHARGE ITEM: Performed by: INTERNAL MEDICINE

## 2020-11-11 PROCEDURE — 10003585 HB ROOM CHARGE INTERMEDIATE CARE

## 2020-11-11 PROCEDURE — 80053 COMPREHEN METABOLIC PANEL: CPT

## 2020-11-11 PROCEDURE — 10002803 HB RX 637: Performed by: INTERNAL MEDICINE

## 2020-11-11 PROCEDURE — 10004281 HB COUNTER-STAFF TIME PER 15 MIN

## 2020-11-11 RX ADMIN — GUAIFENESIN AND DEXTROMETHORPHAN 10 ML: 100; 10 SYRUP ORAL at 00:20

## 2020-11-11 RX ADMIN — PANTOPRAZOLE SODIUM 40 MG: 40 TABLET, DELAYED RELEASE ORAL at 06:13

## 2020-11-11 RX ADMIN — INSULIN LISPRO 4 UNITS: 100 INJECTION, SOLUTION INTRAVENOUS; SUBCUTANEOUS at 12:21

## 2020-11-11 RX ADMIN — GUAIFENESIN AND DEXTROMETHORPHAN 10 ML: 100; 10 SYRUP ORAL at 12:27

## 2020-11-11 RX ADMIN — INSULIN LISPRO 4 UNITS: 100 INJECTION, SOLUTION INTRAVENOUS; SUBCUTANEOUS at 07:49

## 2020-11-11 RX ADMIN — ENOXAPARIN SODIUM 90 MG: 100 INJECTION SUBCUTANEOUS at 09:34

## 2020-11-11 RX ADMIN — SODIUM CHLORIDE, PRESERVATIVE FREE 2 ML: 5 INJECTION INTRAVENOUS at 09:00

## 2020-11-11 RX ADMIN — INSULIN LISPRO 8 UNITS: 100 INJECTION, SOLUTION INTRAVENOUS; SUBCUTANEOUS at 17:07

## 2020-11-11 RX ADMIN — GUAIFENESIN AND DEXTROMETHORPHAN 10 ML: 100; 10 SYRUP ORAL at 06:13

## 2020-11-11 RX ADMIN — ERGOCALCIFEROL 1.25 MG: 1.25 CAPSULE ORAL at 09:33

## 2020-11-11 RX ADMIN — FUROSEMIDE 20 MG: 10 INJECTION, SOLUTION INTRAMUSCULAR; INTRAVENOUS at 09:34

## 2020-11-11 RX ADMIN — INSULIN LISPRO 6 UNITS: 100 INJECTION, SOLUTION INTRAVENOUS; SUBCUTANEOUS at 20:48

## 2020-11-11 RX ADMIN — INSULIN GLARGINE 14 UNITS: 100 INJECTION, SOLUTION SUBCUTANEOUS at 20:48

## 2020-11-11 RX ADMIN — GUAIFENESIN AND DEXTROMETHORPHAN 10 ML: 100; 10 SYRUP ORAL at 18:55

## 2020-11-11 RX ADMIN — SODIUM CHLORIDE, PRESERVATIVE FREE 2 ML: 5 INJECTION INTRAVENOUS at 21:00

## 2020-11-11 RX ADMIN — ENOXAPARIN SODIUM 90 MG: 100 INJECTION SUBCUTANEOUS at 20:48

## 2020-11-11 ASSESSMENT — PAIN SCALES - GENERAL
PAINLEVEL_OUTOF10: 0

## 2020-11-12 LAB
ANION GAP SERPL CALC-SCNC: 13 MMOL/L (ref 10–20)
BUN SERPL-MCNC: 9 MG/DL (ref 6–20)
BUN/CREAT SERPL: 17 (ref 7–25)
CALCIUM SERPL-MCNC: 8.8 MG/DL (ref 8.4–10.2)
CHLORIDE SERPL-SCNC: 96 MMOL/L (ref 98–107)
CO2 SERPL-SCNC: 28 MMOL/L (ref 21–32)
CREAT SERPL-MCNC: 0.53 MG/DL (ref 0.51–0.95)
GLUCOSE BLDC GLUCOMTR-MCNC: 173 MG/DL (ref 70–99)
GLUCOSE BLDC GLUCOMTR-MCNC: 195 MG/DL (ref 70–99)
GLUCOSE BLDC GLUCOMTR-MCNC: 248 MG/DL (ref 70–99)
GLUCOSE BLDC GLUCOMTR-MCNC: 271 MG/DL (ref 70–99)
GLUCOSE BLDC GLUCOMTR-MCNC: 328 MG/DL (ref 70–99)
GLUCOSE SERPL-MCNC: 216 MG/DL (ref 65–99)
POTASSIUM SERPL-SCNC: 3.9 MMOL/L (ref 3.4–5.1)
SODIUM SERPL-SCNC: 133 MMOL/L (ref 135–145)
TSH SERPL-ACNC: 2.79 MCUNITS/ML (ref 0.35–5)

## 2020-11-12 PROCEDURE — 10003585 HB ROOM CHARGE INTERMEDIATE CARE

## 2020-11-12 PROCEDURE — 10002800 HB RX 250 W HCPCS: Performed by: INTERNAL MEDICINE

## 2020-11-12 PROCEDURE — 10002803 HB RX 637: Performed by: INTERNAL MEDICINE

## 2020-11-12 PROCEDURE — 99233 SBSQ HOSP IP/OBS HIGH 50: CPT | Performed by: INTERNAL MEDICINE

## 2020-11-12 PROCEDURE — 10004651 HB RX, NO CHARGE ITEM: Performed by: INTERNAL MEDICINE

## 2020-11-12 PROCEDURE — 84443 ASSAY THYROID STIM HORMONE: CPT

## 2020-11-12 PROCEDURE — 13003289 HB OXYGEN THERAPY DAILY

## 2020-11-12 PROCEDURE — 80048 BASIC METABOLIC PNL TOTAL CA: CPT

## 2020-11-12 RX ORDER — POTASSIUM CHLORIDE 20 MEQ/1
40 TABLET, EXTENDED RELEASE ORAL ONCE
Status: COMPLETED | OUTPATIENT
Start: 2020-11-12 | End: 2020-11-12

## 2020-11-12 RX ADMIN — INSULIN LISPRO 9 UNITS: 100 INJECTION, SOLUTION INTRAVENOUS; SUBCUTANEOUS at 18:18

## 2020-11-12 RX ADMIN — FUROSEMIDE 20 MG: 10 INJECTION, SOLUTION INTRAMUSCULAR; INTRAVENOUS at 10:00

## 2020-11-12 RX ADMIN — INSULIN GLARGINE 14 UNITS: 100 INJECTION, SOLUTION SUBCUTANEOUS at 22:01

## 2020-11-12 RX ADMIN — PANTOPRAZOLE SODIUM 40 MG: 40 TABLET, DELAYED RELEASE ORAL at 06:14

## 2020-11-12 RX ADMIN — POTASSIUM CHLORIDE 40 MEQ: 1500 TABLET, EXTENDED RELEASE ORAL at 20:55

## 2020-11-12 RX ADMIN — GUAIFENESIN AND DEXTROMETHORPHAN 10 ML: 100; 10 SYRUP ORAL at 13:15

## 2020-11-12 RX ADMIN — APIXABAN 5 MG: 5 TABLET, FILM COATED ORAL at 22:02

## 2020-11-12 RX ADMIN — SODIUM CHLORIDE, PRESERVATIVE FREE 20 ML: 5 INJECTION INTRAVENOUS at 10:00

## 2020-11-12 RX ADMIN — INSULIN LISPRO 8 UNITS: 100 INJECTION, SOLUTION INTRAVENOUS; SUBCUTANEOUS at 22:02

## 2020-11-12 RX ADMIN — GUAIFENESIN AND DEXTROMETHORPHAN 10 ML: 100; 10 SYRUP ORAL at 20:54

## 2020-11-12 RX ADMIN — INSULIN LISPRO 7 UNITS: 100 INJECTION, SOLUTION INTRAVENOUS; SUBCUTANEOUS at 12:04

## 2020-11-12 RX ADMIN — ENOXAPARIN SODIUM 90 MG: 100 INJECTION SUBCUTANEOUS at 09:00

## 2020-11-12 RX ADMIN — GUAIFENESIN AND DEXTROMETHORPHAN 10 ML: 100; 10 SYRUP ORAL at 06:52

## 2020-11-12 RX ADMIN — GUAIFENESIN AND DEXTROMETHORPHAN 10 ML: 100; 10 SYRUP ORAL at 00:33

## 2020-11-12 ASSESSMENT — PAIN SCALES - GENERAL
PAINLEVEL_OUTOF10: 0

## 2020-11-13 ENCOUNTER — CASE MANAGEMENT (OUTPATIENT)
Dept: CARE COORDINATION | Age: 55
End: 2020-11-13

## 2020-11-13 LAB
ANION GAP SERPL CALC-SCNC: 8 MMOL/L (ref 10–20)
BUN SERPL-MCNC: 9 MG/DL (ref 6–20)
BUN/CREAT SERPL: 19 (ref 7–25)
CALCIUM SERPL-MCNC: 8.6 MG/DL (ref 8.4–10.2)
CHLORIDE SERPL-SCNC: 99 MMOL/L (ref 98–107)
CO2 SERPL-SCNC: 32 MMOL/L (ref 21–32)
CREAT SERPL-MCNC: 0.48 MG/DL (ref 0.51–0.95)
D DIMER PPP FEU-MCNC: 2.67 MG/L (FEU)
ERYTHROCYTE [DISTWIDTH] IN BLOOD: 12.8 % (ref 11–15)
GLUCOSE BLDC GLUCOMTR-MCNC: 171 MG/DL (ref 70–99)
GLUCOSE BLDC GLUCOMTR-MCNC: 180 MG/DL (ref 70–99)
GLUCOSE BLDC GLUCOMTR-MCNC: 187 MG/DL (ref 70–99)
GLUCOSE BLDC GLUCOMTR-MCNC: 194 MG/DL (ref 70–99)
GLUCOSE BLDC GLUCOMTR-MCNC: 250 MG/DL (ref 70–99)
GLUCOSE SERPL-MCNC: 162 MG/DL (ref 65–99)
HCT VFR BLD CALC: 37.4 % (ref 36–46.5)
HGB BLD-MCNC: 12.8 G/DL (ref 12–15.5)
MCH RBC QN AUTO: 29.6 PG (ref 26–34)
MCHC RBC AUTO-ENTMCNC: 34.2 G/DL (ref 32–36.5)
MCV RBC AUTO: 86.6 FL (ref 78–100)
NRBC BLD MANUAL-RTO: 0 /100 WBC
PLATELET # BLD: 262 K/MCL (ref 140–450)
POTASSIUM SERPL-SCNC: 4.3 MMOL/L (ref 3.4–5.1)
RBC # BLD: 4.32 MIL/MCL (ref 4–5.2)
SODIUM SERPL-SCNC: 135 MMOL/L (ref 135–145)
WBC # BLD: 5.9 K/MCL (ref 4.2–11)

## 2020-11-13 PROCEDURE — 10002803 HB RX 637: Performed by: INTERNAL MEDICINE

## 2020-11-13 PROCEDURE — 99233 SBSQ HOSP IP/OBS HIGH 50: CPT | Performed by: INTERNAL MEDICINE

## 2020-11-13 PROCEDURE — 85379 FIBRIN DEGRADATION QUANT: CPT

## 2020-11-13 PROCEDURE — 10002800 HB RX 250 W HCPCS: Performed by: INTERNAL MEDICINE

## 2020-11-13 PROCEDURE — 10003585 HB ROOM CHARGE INTERMEDIATE CARE

## 2020-11-13 PROCEDURE — 10004651 HB RX, NO CHARGE ITEM: Performed by: INTERNAL MEDICINE

## 2020-11-13 PROCEDURE — 13003289 HB OXYGEN THERAPY DAILY

## 2020-11-13 PROCEDURE — 80048 BASIC METABOLIC PNL TOTAL CA: CPT

## 2020-11-13 PROCEDURE — 85027 COMPLETE CBC AUTOMATED: CPT

## 2020-11-13 RX ADMIN — APIXABAN 5 MG: 5 TABLET, FILM COATED ORAL at 22:24

## 2020-11-13 RX ADMIN — GUAIFENESIN AND DEXTROMETHORPHAN 10 ML: 100; 10 SYRUP ORAL at 17:11

## 2020-11-13 RX ADMIN — INSULIN LISPRO 5 UNITS: 100 INJECTION, SOLUTION INTRAVENOUS; SUBCUTANEOUS at 18:35

## 2020-11-13 RX ADMIN — INSULIN LISPRO 9 UNITS: 100 INJECTION, SOLUTION INTRAVENOUS; SUBCUTANEOUS at 09:50

## 2020-11-13 RX ADMIN — INSULIN GLARGINE 14 UNITS: 100 INJECTION, SOLUTION SUBCUTANEOUS at 22:34

## 2020-11-13 RX ADMIN — GUAIFENESIN AND DEXTROMETHORPHAN 10 ML: 100; 10 SYRUP ORAL at 03:19

## 2020-11-13 RX ADMIN — INSULIN LISPRO 5 UNITS: 100 INJECTION, SOLUTION INTRAVENOUS; SUBCUTANEOUS at 13:15

## 2020-11-13 RX ADMIN — SODIUM CHLORIDE, PRESERVATIVE FREE 10 ML: 5 INJECTION INTRAVENOUS at 08:47

## 2020-11-13 RX ADMIN — APIXABAN 5 MG: 5 TABLET, FILM COATED ORAL at 08:46

## 2020-11-13 RX ADMIN — ALTEPLASE 2 MG: 2.2 INJECTION, POWDER, LYOPHILIZED, FOR SOLUTION INTRAVENOUS at 13:11

## 2020-11-13 RX ADMIN — PANTOPRAZOLE SODIUM 40 MG: 40 TABLET, DELAYED RELEASE ORAL at 06:07

## 2020-11-13 RX ADMIN — GUAIFENESIN AND DEXTROMETHORPHAN 10 ML: 100; 10 SYRUP ORAL at 22:48

## 2020-11-13 RX ADMIN — INSULIN LISPRO 2 UNITS: 100 INJECTION, SOLUTION INTRAVENOUS; SUBCUTANEOUS at 22:25

## 2020-11-13 RX ADMIN — GUAIFENESIN AND DEXTROMETHORPHAN 10 ML: 100; 10 SYRUP ORAL at 09:50

## 2020-11-13 ASSESSMENT — PAIN SCALES - GENERAL
PAINLEVEL_OUTOF10: 0
PAINLEVEL_OUTOF10: 0

## 2020-11-14 ENCOUNTER — APPOINTMENT (OUTPATIENT)
Dept: CARDIOLOGY | Age: 55
DRG: 004 | End: 2020-11-14
Attending: INTERNAL MEDICINE

## 2020-11-14 LAB
GLUCOSE BLDC GLUCOMTR-MCNC: 161 MG/DL (ref 70–99)
GLUCOSE BLDC GLUCOMTR-MCNC: 183 MG/DL (ref 70–99)
GLUCOSE BLDC GLUCOMTR-MCNC: 231 MG/DL (ref 70–99)
GLUCOSE BLDC GLUCOMTR-MCNC: 246 MG/DL (ref 70–99)
GLUCOSE BLDC GLUCOMTR-MCNC: 275 MG/DL (ref 70–99)

## 2020-11-14 PROCEDURE — 10002800 HB RX 250 W HCPCS: Performed by: INTERNAL MEDICINE

## 2020-11-14 PROCEDURE — 10002803 HB RX 637: Performed by: INTERNAL MEDICINE

## 2020-11-14 PROCEDURE — 93306 TTE W/DOPPLER COMPLETE: CPT

## 2020-11-14 PROCEDURE — 13003289 HB OXYGEN THERAPY DAILY

## 2020-11-14 PROCEDURE — 99233 SBSQ HOSP IP/OBS HIGH 50: CPT | Performed by: INTERNAL MEDICINE

## 2020-11-14 PROCEDURE — 10004651 HB RX, NO CHARGE ITEM: Performed by: INTERNAL MEDICINE

## 2020-11-14 PROCEDURE — 10003585 HB ROOM CHARGE INTERMEDIATE CARE

## 2020-11-14 PROCEDURE — 93306 TTE W/DOPPLER COMPLETE: CPT | Performed by: INTERNAL MEDICINE

## 2020-11-14 RX ORDER — INSULIN GLARGINE 100 [IU]/ML
20 INJECTION, SOLUTION SUBCUTANEOUS NIGHTLY
Status: DISCONTINUED | OUTPATIENT
Start: 2020-11-14 | End: 2020-11-20

## 2020-11-14 RX ADMIN — PANTOPRAZOLE SODIUM 40 MG: 40 TABLET, DELAYED RELEASE ORAL at 05:50

## 2020-11-14 RX ADMIN — INSULIN LISPRO 2 UNITS: 100 INJECTION, SOLUTION INTRAVENOUS; SUBCUTANEOUS at 21:16

## 2020-11-14 RX ADMIN — GUAIFENESIN AND DEXTROMETHORPHAN 10 ML: 100; 10 SYRUP ORAL at 21:16

## 2020-11-14 RX ADMIN — INSULIN LISPRO 5 UNITS: 100 INJECTION, SOLUTION INTRAVENOUS; SUBCUTANEOUS at 12:15

## 2020-11-14 RX ADMIN — SODIUM CHLORIDE, PRESERVATIVE FREE 2 ML: 5 INJECTION INTRAVENOUS at 08:32

## 2020-11-14 RX ADMIN — GUAIFENESIN AND DEXTROMETHORPHAN 10 ML: 100; 10 SYRUP ORAL at 08:32

## 2020-11-14 RX ADMIN — SODIUM CHLORIDE, PRESERVATIVE FREE 2 ML: 5 INJECTION INTRAVENOUS at 21:06

## 2020-11-14 RX ADMIN — INSULIN GLARGINE 20 UNITS: 100 INJECTION, SOLUTION SUBCUTANEOUS at 21:11

## 2020-11-14 RX ADMIN — SENNOSIDES AND DOCUSATE SODIUM 2 TABLET: 8.6; 5 TABLET ORAL at 08:32

## 2020-11-14 RX ADMIN — APIXABAN 5 MG: 5 TABLET, FILM COATED ORAL at 07:54

## 2020-11-14 RX ADMIN — GUAIFENESIN AND DEXTROMETHORPHAN 10 ML: 100; 10 SYRUP ORAL at 15:56

## 2020-11-14 RX ADMIN — INSULIN LISPRO 9 UNITS: 100 INJECTION, SOLUTION INTRAVENOUS; SUBCUTANEOUS at 17:47

## 2020-11-14 RX ADMIN — APIXABAN 5 MG: 5 TABLET, FILM COATED ORAL at 21:06

## 2020-11-14 RX ADMIN — INSULIN LISPRO 6 UNITS: 100 INJECTION, SOLUTION INTRAVENOUS; SUBCUTANEOUS at 07:54

## 2020-11-14 ASSESSMENT — PAIN SCALES - GENERAL
PAINLEVEL_OUTOF10: 0
PAINLEVEL_OUTOF10: 0

## 2020-11-15 ENCOUNTER — APPOINTMENT (OUTPATIENT)
Dept: CT IMAGING | Age: 55
DRG: 004 | End: 2020-11-15
Attending: INTERNAL MEDICINE

## 2020-11-15 ENCOUNTER — APPOINTMENT (OUTPATIENT)
Dept: ULTRASOUND IMAGING | Age: 55
DRG: 004 | End: 2020-11-15
Attending: INTERNAL MEDICINE

## 2020-11-15 LAB
ANION GAP SERPL CALC-SCNC: 8 MMOL/L (ref 10–20)
BUN SERPL-MCNC: 8 MG/DL (ref 6–20)
BUN/CREAT SERPL: 14 (ref 7–25)
CALCIUM SERPL-MCNC: 8.7 MG/DL (ref 8.4–10.2)
CHLORIDE SERPL-SCNC: 101 MMOL/L (ref 98–107)
CO2 SERPL-SCNC: 31 MMOL/L (ref 21–32)
CREAT SERPL-MCNC: 0.57 MG/DL (ref 0.51–0.95)
D DIMER PPP FEU-MCNC: 2.32 MG/L (FEU)
GLUCOSE BLDC GLUCOMTR-MCNC: 143 MG/DL (ref 70–99)
GLUCOSE BLDC GLUCOMTR-MCNC: 147 MG/DL (ref 70–99)
GLUCOSE BLDC GLUCOMTR-MCNC: 175 MG/DL (ref 70–99)
GLUCOSE BLDC GLUCOMTR-MCNC: 193 MG/DL (ref 70–99)
GLUCOSE BLDC GLUCOMTR-MCNC: 196 MG/DL (ref 70–99)
GLUCOSE SERPL-MCNC: 215 MG/DL (ref 65–99)
POTASSIUM SERPL-SCNC: 4.3 MMOL/L (ref 3.4–5.1)
SODIUM SERPL-SCNC: 136 MMOL/L (ref 135–145)
TSH SERPL-ACNC: 2.69 MCUNITS/ML (ref 0.35–5)

## 2020-11-15 PROCEDURE — 99231 SBSQ HOSP IP/OBS SF/LOW 25: CPT | Performed by: INTERNAL MEDICINE

## 2020-11-15 PROCEDURE — 71275 CT ANGIOGRAPHY CHEST: CPT

## 2020-11-15 PROCEDURE — 93970 EXTREMITY STUDY: CPT

## 2020-11-15 PROCEDURE — 10002803 HB RX 637: Performed by: INTERNAL MEDICINE

## 2020-11-15 PROCEDURE — 10002800 HB RX 250 W HCPCS: Performed by: INTERNAL MEDICINE

## 2020-11-15 PROCEDURE — 10002807 HB RX 258: Performed by: INTERNAL MEDICINE

## 2020-11-15 PROCEDURE — 99233 SBSQ HOSP IP/OBS HIGH 50: CPT | Performed by: INTERNAL MEDICINE

## 2020-11-15 PROCEDURE — 84443 ASSAY THYROID STIM HORMONE: CPT

## 2020-11-15 PROCEDURE — 13003289 HB OXYGEN THERAPY DAILY

## 2020-11-15 PROCEDURE — 10002805 HB CONTRAST AGENT: Performed by: INTERNAL MEDICINE

## 2020-11-15 PROCEDURE — 80048 BASIC METABOLIC PNL TOTAL CA: CPT

## 2020-11-15 PROCEDURE — 10003585 HB ROOM CHARGE INTERMEDIATE CARE

## 2020-11-15 PROCEDURE — 85379 FIBRIN DEGRADATION QUANT: CPT

## 2020-11-15 PROCEDURE — 10004651 HB RX, NO CHARGE ITEM: Performed by: INTERNAL MEDICINE

## 2020-11-15 RX ORDER — SODIUM CHLORIDE 9 MG/ML
INJECTION, SOLUTION INTRAVENOUS CONTINUOUS
Status: DISCONTINUED | OUTPATIENT
Start: 2020-11-15 | End: 2020-11-18

## 2020-11-15 RX ADMIN — GUAIFENESIN AND DEXTROMETHORPHAN 10 ML: 100; 10 SYRUP ORAL at 11:00

## 2020-11-15 RX ADMIN — INSULIN LISPRO 7 UNITS: 100 INJECTION, SOLUTION INTRAVENOUS; SUBCUTANEOUS at 18:52

## 2020-11-15 RX ADMIN — APIXABAN 5 MG: 5 TABLET, FILM COATED ORAL at 08:11

## 2020-11-15 RX ADMIN — INSULIN LISPRO 7 UNITS: 100 INJECTION, SOLUTION INTRAVENOUS; SUBCUTANEOUS at 21:06

## 2020-11-15 RX ADMIN — GUAIFENESIN AND DEXTROMETHORPHAN 10 ML: 100; 10 SYRUP ORAL at 03:42

## 2020-11-15 RX ADMIN — PANTOPRAZOLE SODIUM 40 MG: 40 TABLET, DELAYED RELEASE ORAL at 05:45

## 2020-11-15 RX ADMIN — SODIUM CHLORIDE, PRESERVATIVE FREE 10 ML: 5 INJECTION INTRAVENOUS at 08:15

## 2020-11-15 RX ADMIN — GUAIFENESIN AND DEXTROMETHORPHAN 10 ML: 100; 10 SYRUP ORAL at 18:52

## 2020-11-15 RX ADMIN — SODIUM CHLORIDE: 9 INJECTION, SOLUTION INTRAVENOUS at 11:02

## 2020-11-15 RX ADMIN — INSULIN GLARGINE 20 UNITS: 100 INJECTION, SOLUTION SUBCUTANEOUS at 21:06

## 2020-11-15 RX ADMIN — APIXABAN 5 MG: 5 TABLET, FILM COATED ORAL at 21:06

## 2020-11-15 RX ADMIN — IOHEXOL 100 ML: 350 INJECTION, SOLUTION INTRAVENOUS at 13:30

## 2020-11-15 RX ADMIN — INSULIN LISPRO 7 UNITS: 100 INJECTION, SOLUTION INTRAVENOUS; SUBCUTANEOUS at 08:11

## 2020-11-15 RX ADMIN — BISACODYL 5 MG: 5 TABLET, COATED ORAL at 21:15

## 2020-11-15 ASSESSMENT — PAIN SCALES - GENERAL
PAINLEVEL_OUTOF10: 0
PAINLEVEL_OUTOF10: 2

## 2020-11-16 LAB
ALBUMIN SERPL-MCNC: 2.3 G/DL (ref 3.6–5.1)
ALBUMIN/GLOB SERPL: 0.6 {RATIO} (ref 1–2.4)
ALP SERPL-CCNC: 83 UNITS/L (ref 45–117)
ALT SERPL-CCNC: 14 UNITS/L
ANION GAP SERPL CALC-SCNC: 9 MMOL/L (ref 10–20)
AST SERPL-CCNC: 19 UNITS/L
BILIRUB SERPL-MCNC: 1.2 MG/DL (ref 0.2–1)
BUN SERPL-MCNC: 7 MG/DL (ref 6–20)
BUN/CREAT SERPL: 13 (ref 7–25)
CALCIUM SERPL-MCNC: 8.3 MG/DL (ref 8.4–10.2)
CHLORIDE SERPL-SCNC: 103 MMOL/L (ref 98–107)
CO2 SERPL-SCNC: 30 MMOL/L (ref 21–32)
CREAT SERPL-MCNC: 0.52 MG/DL (ref 0.51–0.95)
ERYTHROCYTE [DISTWIDTH] IN BLOOD: 13.4 % (ref 11–15)
GLOBULIN SER-MCNC: 4 G/DL (ref 2–4)
GLUCOSE BLDC GLUCOMTR-MCNC: 114 MG/DL (ref 70–99)
GLUCOSE BLDC GLUCOMTR-MCNC: 169 MG/DL (ref 70–99)
GLUCOSE SERPL-MCNC: 180 MG/DL (ref 65–99)
HCT VFR BLD CALC: 34 % (ref 36–46.5)
HGB BLD-MCNC: 11.4 G/DL (ref 12–15.5)
LIPASE SERPL-CCNC: 93 UNITS/L (ref 73–393)
MCH RBC QN AUTO: 29.8 PG (ref 26–34)
MCHC RBC AUTO-ENTMCNC: 33.5 G/DL (ref 32–36.5)
MCV RBC AUTO: 89 FL (ref 78–100)
NRBC BLD MANUAL-RTO: 0 /100 WBC
PLATELET # BLD: 145 K/MCL (ref 140–450)
POTASSIUM SERPL-SCNC: 4.1 MMOL/L (ref 3.4–5.1)
PROT SERPL-MCNC: 6.3 G/DL (ref 6.4–8.2)
RBC # BLD: 3.82 MIL/MCL (ref 4–5.2)
SODIUM SERPL-SCNC: 138 MMOL/L (ref 135–145)
TSH SERPL-ACNC: 2.6 MCUNITS/ML (ref 0.35–5)
WBC # BLD: 8.1 K/MCL (ref 4.2–11)

## 2020-11-16 PROCEDURE — 99233 SBSQ HOSP IP/OBS HIGH 50: CPT | Performed by: INTERNAL MEDICINE

## 2020-11-16 PROCEDURE — 10003585 HB ROOM CHARGE INTERMEDIATE CARE

## 2020-11-16 PROCEDURE — 10004651 HB RX, NO CHARGE ITEM: Performed by: INTERNAL MEDICINE

## 2020-11-16 PROCEDURE — 82378 CARCINOEMBRYONIC ANTIGEN: CPT

## 2020-11-16 PROCEDURE — 10002803 HB RX 637: Performed by: INTERNAL MEDICINE

## 2020-11-16 PROCEDURE — 10002807 HB RX 258: Performed by: INTERNAL MEDICINE

## 2020-11-16 PROCEDURE — 99231 SBSQ HOSP IP/OBS SF/LOW 25: CPT | Performed by: INTERNAL MEDICINE

## 2020-11-16 PROCEDURE — 86301 IMMUNOASSAY TUMOR CA 19-9: CPT

## 2020-11-16 PROCEDURE — 10002800 HB RX 250 W HCPCS: Performed by: INTERNAL MEDICINE

## 2020-11-16 PROCEDURE — 80053 COMPREHEN METABOLIC PANEL: CPT

## 2020-11-16 PROCEDURE — 84443 ASSAY THYROID STIM HORMONE: CPT

## 2020-11-16 PROCEDURE — 85027 COMPLETE CBC AUTOMATED: CPT

## 2020-11-16 PROCEDURE — 83690 ASSAY OF LIPASE: CPT

## 2020-11-16 PROCEDURE — 13003289 HB OXYGEN THERAPY DAILY

## 2020-11-16 RX ADMIN — GUAIFENESIN AND DEXTROMETHORPHAN 10 ML: 100; 10 SYRUP ORAL at 18:51

## 2020-11-16 RX ADMIN — SODIUM CHLORIDE, PRESERVATIVE FREE 2 ML: 5 INJECTION INTRAVENOUS at 23:09

## 2020-11-16 RX ADMIN — PANTOPRAZOLE SODIUM 40 MG: 40 TABLET, DELAYED RELEASE ORAL at 05:44

## 2020-11-16 RX ADMIN — SODIUM CHLORIDE, PRESERVATIVE FREE 2 ML: 5 INJECTION INTRAVENOUS at 09:00

## 2020-11-16 RX ADMIN — BISACODYL 5 MG: 5 TABLET, COATED ORAL at 22:32

## 2020-11-16 RX ADMIN — SODIUM CHLORIDE: 9 INJECTION, SOLUTION INTRAVENOUS at 00:09

## 2020-11-16 RX ADMIN — GUAIFENESIN AND DEXTROMETHORPHAN 10 ML: 100; 10 SYRUP ORAL at 09:46

## 2020-11-16 RX ADMIN — INSULIN LISPRO 11 UNITS: 100 INJECTION, SOLUTION INTRAVENOUS; SUBCUTANEOUS at 18:11

## 2020-11-16 RX ADMIN — INSULIN LISPRO 7 UNITS: 100 INJECTION, SOLUTION INTRAVENOUS; SUBCUTANEOUS at 09:47

## 2020-11-16 RX ADMIN — GUAIFENESIN AND DEXTROMETHORPHAN 10 ML: 100; 10 SYRUP ORAL at 01:06

## 2020-11-16 RX ADMIN — APIXABAN 5 MG: 5 TABLET, FILM COATED ORAL at 22:26

## 2020-11-16 RX ADMIN — SODIUM CHLORIDE: 9 INJECTION, SOLUTION INTRAVENOUS at 16:54

## 2020-11-16 RX ADMIN — APIXABAN 5 MG: 5 TABLET, FILM COATED ORAL at 09:45

## 2020-11-16 RX ADMIN — INSULIN GLARGINE 20 UNITS: 100 INJECTION, SOLUTION SUBCUTANEOUS at 22:27

## 2020-11-16 ASSESSMENT — PAIN SCALES - GENERAL
PAINLEVEL_OUTOF10: 0
PAINLEVEL_OUTOF10: 0

## 2020-11-17 ENCOUNTER — TELEPHONE (OUTPATIENT)
Dept: SCHEDULING | Age: 55
End: 2020-11-17

## 2020-11-17 LAB
ANION GAP SERPL CALC-SCNC: 8 MMOL/L (ref 10–20)
BASOPHILS # BLD: 0.1 K/MCL (ref 0–0.3)
BASOPHILS NFR BLD: 1 %
BUN SERPL-MCNC: 7 MG/DL (ref 6–20)
BUN/CREAT SERPL: 14 (ref 7–25)
CALCIUM SERPL-MCNC: 8.5 MG/DL (ref 8.4–10.2)
CANCER AG19-9 SERPL-ACNC: 94 UNITS/ML (ref 0–35)
CEA SERPL-MCNC: 6.8 NG/ML (ref 0–5)
CHLORIDE SERPL-SCNC: 103 MMOL/L (ref 98–107)
CO2 SERPL-SCNC: 28 MMOL/L (ref 21–32)
CREAT SERPL-MCNC: 0.49 MG/DL (ref 0.51–0.95)
CRP SERPL-MCNC: 10.2 MG/DL
D DIMER PPP FEU-MCNC: 0.95 MG/L (FEU)
DIFFERENTIAL METHOD BLD: ABNORMAL
EOSINOPHIL # BLD: 0.4 K/MCL (ref 0.1–0.5)
EOSINOPHIL NFR BLD: 4 %
ERYTHROCYTE [DISTWIDTH] IN BLOOD: 13.6 % (ref 11–15)
FERRITIN SERPL-MCNC: 250 NG/ML (ref 8–252)
GLUCOSE BLDC GLUCOMTR-MCNC: 106 MG/DL (ref 70–99)
GLUCOSE BLDC GLUCOMTR-MCNC: 121 MG/DL (ref 70–99)
GLUCOSE BLDC GLUCOMTR-MCNC: 155 MG/DL (ref 70–99)
GLUCOSE BLDC GLUCOMTR-MCNC: 169 MG/DL (ref 70–99)
GLUCOSE BLDC GLUCOMTR-MCNC: 171 MG/DL (ref 70–99)
GLUCOSE BLDC GLUCOMTR-MCNC: 255 MG/DL (ref 70–99)
GLUCOSE SERPL-MCNC: 151 MG/DL (ref 65–99)
HCT VFR BLD CALC: 33.4 % (ref 36–46.5)
HGB BLD-MCNC: 11.2 G/DL (ref 12–15.5)
IMM GRANULOCYTES # BLD AUTO: 0 K/MCL (ref 0–0.2)
IMM GRANULOCYTES NFR BLD: 0 %
LYMPHOCYTES # BLD: 0.7 K/MCL (ref 1–4)
LYMPHOCYTES NFR BLD: 8 %
MCH RBC QN AUTO: 29.9 PG (ref 26–34)
MCHC RBC AUTO-ENTMCNC: 33.5 G/DL (ref 32–36.5)
MCV RBC AUTO: 89.1 FL (ref 78–100)
MONOCYTES # BLD: 0.8 K/MCL (ref 0.3–0.9)
MONOCYTES NFR BLD: 8 %
NEUTROPHILS # BLD: 7.8 K/MCL (ref 1.8–7.7)
NEUTROPHILS NFR BLD: 79 %
NRBC BLD MANUAL-RTO: 0 /100 WBC
PLATELET # BLD: 167 K/MCL (ref 140–450)
POTASSIUM SERPL-SCNC: 4.1 MMOL/L (ref 3.4–5.1)
RBC # BLD: 3.75 MIL/MCL (ref 4–5.2)
SODIUM SERPL-SCNC: 135 MMOL/L (ref 135–145)
TSH SERPL-ACNC: 1.67 MCUNITS/ML (ref 0.35–5)
WBC # BLD: 9.8 K/MCL (ref 4.2–11)

## 2020-11-17 PROCEDURE — 10003585 HB ROOM CHARGE INTERMEDIATE CARE

## 2020-11-17 PROCEDURE — 85025 COMPLETE CBC W/AUTO DIFF WBC: CPT

## 2020-11-17 PROCEDURE — 10004651 HB RX, NO CHARGE ITEM: Performed by: INTERNAL MEDICINE

## 2020-11-17 PROCEDURE — 10002803 HB RX 637: Performed by: INTERNAL MEDICINE

## 2020-11-17 PROCEDURE — 99231 SBSQ HOSP IP/OBS SF/LOW 25: CPT | Performed by: INTERNAL MEDICINE

## 2020-11-17 PROCEDURE — 86140 C-REACTIVE PROTEIN: CPT

## 2020-11-17 PROCEDURE — 99291 CRITICAL CARE FIRST HOUR: CPT | Performed by: FAMILY MEDICINE

## 2020-11-17 PROCEDURE — 10002803 HB RX 637: Performed by: FAMILY MEDICINE

## 2020-11-17 PROCEDURE — 10002800 HB RX 250 W HCPCS: Performed by: INTERNAL MEDICINE

## 2020-11-17 PROCEDURE — 85379 FIBRIN DEGRADATION QUANT: CPT

## 2020-11-17 PROCEDURE — 36415 COLL VENOUS BLD VENIPUNCTURE: CPT

## 2020-11-17 PROCEDURE — 13003289 HB OXYGEN THERAPY DAILY

## 2020-11-17 PROCEDURE — 94640 AIRWAY INHALATION TREATMENT: CPT

## 2020-11-17 PROCEDURE — 84443 ASSAY THYROID STIM HORMONE: CPT

## 2020-11-17 PROCEDURE — 82728 ASSAY OF FERRITIN: CPT

## 2020-11-17 PROCEDURE — 10004281 HB COUNTER-STAFF TIME PER 15 MIN

## 2020-11-17 PROCEDURE — 80048 BASIC METABOLIC PNL TOTAL CA: CPT

## 2020-11-17 RX ADMIN — ALBUTEROL SULFATE 2 PUFF: 90 AEROSOL, METERED RESPIRATORY (INHALATION) at 11:58

## 2020-11-17 RX ADMIN — SODIUM CHLORIDE, PRESERVATIVE FREE 2 ML: 5 INJECTION INTRAVENOUS at 11:44

## 2020-11-17 RX ADMIN — APIXABAN 2.5 MG: 5 TABLET, FILM COATED ORAL at 16:57

## 2020-11-17 RX ADMIN — INSULIN LISPRO 8 UNITS: 100 INJECTION, SOLUTION INTRAVENOUS; SUBCUTANEOUS at 17:15

## 2020-11-17 RX ADMIN — INSULIN LISPRO 6 UNITS: 100 INJECTION, SOLUTION INTRAVENOUS; SUBCUTANEOUS at 22:00

## 2020-11-17 RX ADMIN — PANTOPRAZOLE SODIUM 40 MG: 40 TABLET, DELAYED RELEASE ORAL at 05:29

## 2020-11-17 RX ADMIN — GUAIFENESIN AND DEXTROMETHORPHAN 10 ML: 100; 10 SYRUP ORAL at 22:00

## 2020-11-17 RX ADMIN — APIXABAN 2.5 MG: 5 TABLET, FILM COATED ORAL at 22:00

## 2020-11-17 RX ADMIN — GUAIFENESIN AND DEXTROMETHORPHAN 10 ML: 100; 10 SYRUP ORAL at 05:30

## 2020-11-17 RX ADMIN — GUAIFENESIN AND DEXTROMETHORPHAN 10 ML: 100; 10 SYRUP ORAL at 16:57

## 2020-11-17 RX ADMIN — INSULIN LISPRO 8 UNITS: 100 INJECTION, SOLUTION INTRAVENOUS; SUBCUTANEOUS at 09:30

## 2020-11-17 RX ADMIN — GUAIFENESIN AND DEXTROMETHORPHAN 10 ML: 100; 10 SYRUP ORAL at 11:49

## 2020-11-17 RX ADMIN — INSULIN GLARGINE 20 UNITS: 100 INJECTION, SOLUTION SUBCUTANEOUS at 22:00

## 2020-11-17 RX ADMIN — ALBUTEROL SULFATE 2 PUFF: 90 AEROSOL, METERED RESPIRATORY (INHALATION) at 20:45

## 2020-11-17 ASSESSMENT — PAIN SCALES - GENERAL
PAINLEVEL_OUTOF10: 2
PAINLEVEL_OUTOF10: 0

## 2020-11-18 LAB
ANION GAP SERPL CALC-SCNC: 10 MMOL/L (ref 10–20)
BUN SERPL-MCNC: 8 MG/DL (ref 6–20)
BUN/CREAT SERPL: 14 (ref 7–25)
CALCIUM SERPL-MCNC: 8.3 MG/DL (ref 8.4–10.2)
CHLORIDE SERPL-SCNC: 99 MMOL/L (ref 98–107)
CO2 SERPL-SCNC: 27 MMOL/L (ref 21–32)
CREAT SERPL-MCNC: 0.57 MG/DL (ref 0.51–0.95)
CRP SERPL-MCNC: 27.1 MG/DL
D DIMER PPP FEU-MCNC: 1.36 MG/L (FEU)
ERYTHROCYTE [DISTWIDTH] IN BLOOD: 13.8 % (ref 11–15)
FERRITIN SERPL-MCNC: 342 NG/ML (ref 8–252)
GLUCOSE BLDC GLUCOMTR-MCNC: 134 MG/DL (ref 70–99)
GLUCOSE BLDC GLUCOMTR-MCNC: 146 MG/DL (ref 70–99)
GLUCOSE BLDC GLUCOMTR-MCNC: 147 MG/DL (ref 70–99)
GLUCOSE BLDC GLUCOMTR-MCNC: 156 MG/DL (ref 70–99)
GLUCOSE BLDC GLUCOMTR-MCNC: 167 MG/DL (ref 70–99)
GLUCOSE SERPL-MCNC: 143 MG/DL (ref 65–99)
HCT VFR BLD CALC: 34.7 % (ref 36–46.5)
HGB BLD-MCNC: 11.6 G/DL (ref 12–15.5)
MAGNESIUM SERPL-MCNC: 1.9 MG/DL (ref 1.7–2.4)
MCH RBC QN AUTO: 29.7 PG (ref 26–34)
MCHC RBC AUTO-ENTMCNC: 33.4 G/DL (ref 32–36.5)
MCV RBC AUTO: 89 FL (ref 78–100)
NRBC BLD MANUAL-RTO: 0 /100 WBC
PLATELET # BLD: 149 K/MCL (ref 140–450)
POTASSIUM SERPL-SCNC: 4.1 MMOL/L (ref 3.4–5.1)
RBC # BLD: 3.9 MIL/MCL (ref 4–5.2)
SODIUM SERPL-SCNC: 132 MMOL/L (ref 135–145)
TSH SERPL-ACNC: 1.28 MCUNITS/ML (ref 0.35–5)
WBC # BLD: 12.5 K/MCL (ref 4.2–11)

## 2020-11-18 PROCEDURE — 10002800 HB RX 250 W HCPCS: Performed by: INTERNAL MEDICINE

## 2020-11-18 PROCEDURE — 83735 ASSAY OF MAGNESIUM: CPT

## 2020-11-18 PROCEDURE — 82728 ASSAY OF FERRITIN: CPT

## 2020-11-18 PROCEDURE — 13003292 HB HHF OXYGEN THERAPY DAILY

## 2020-11-18 PROCEDURE — 10002803 HB RX 637: Performed by: FAMILY MEDICINE

## 2020-11-18 PROCEDURE — 85379 FIBRIN DEGRADATION QUANT: CPT

## 2020-11-18 PROCEDURE — 86140 C-REACTIVE PROTEIN: CPT

## 2020-11-18 PROCEDURE — 85027 COMPLETE CBC AUTOMATED: CPT

## 2020-11-18 PROCEDURE — 10002803 HB RX 637: Performed by: INTERNAL MEDICINE

## 2020-11-18 PROCEDURE — 10004651 HB RX, NO CHARGE ITEM: Performed by: INTERNAL MEDICINE

## 2020-11-18 PROCEDURE — 80048 BASIC METABOLIC PNL TOTAL CA: CPT

## 2020-11-18 PROCEDURE — 10002807 HB RX 258: Performed by: INTERNAL MEDICINE

## 2020-11-18 PROCEDURE — 99233 SBSQ HOSP IP/OBS HIGH 50: CPT | Performed by: FAMILY MEDICINE

## 2020-11-18 PROCEDURE — 10003585 HB ROOM CHARGE INTERMEDIATE CARE

## 2020-11-18 PROCEDURE — 84443 ASSAY THYROID STIM HORMONE: CPT

## 2020-11-18 PROCEDURE — 10004281 HB COUNTER-STAFF TIME PER 15 MIN

## 2020-11-18 RX ADMIN — APIXABAN 2.5 MG: 5 TABLET, FILM COATED ORAL at 23:14

## 2020-11-18 RX ADMIN — INSULIN GLARGINE 20 UNITS: 100 INJECTION, SOLUTION SUBCUTANEOUS at 23:14

## 2020-11-18 RX ADMIN — ERGOCALCIFEROL 1.25 MG: 1.25 CAPSULE ORAL at 10:59

## 2020-11-18 RX ADMIN — SODIUM CHLORIDE: 9 INJECTION, SOLUTION INTRAVENOUS at 12:04

## 2020-11-18 RX ADMIN — GUAIFENESIN AND DEXTROMETHORPHAN 10 ML: 100; 10 SYRUP ORAL at 04:00

## 2020-11-18 RX ADMIN — APIXABAN 2.5 MG: 5 TABLET, FILM COATED ORAL at 10:59

## 2020-11-18 RX ADMIN — INSULIN LISPRO 8 UNITS: 100 INJECTION, SOLUTION INTRAVENOUS; SUBCUTANEOUS at 10:58

## 2020-11-18 RX ADMIN — SODIUM CHLORIDE, PRESERVATIVE FREE 2 ML: 5 INJECTION INTRAVENOUS at 23:17

## 2020-11-18 RX ADMIN — SODIUM CHLORIDE, PRESERVATIVE FREE 2 ML: 5 INJECTION INTRAVENOUS at 11:00

## 2020-11-18 RX ADMIN — INSULIN LISPRO 6 UNITS: 100 INJECTION, SOLUTION INTRAVENOUS; SUBCUTANEOUS at 19:20

## 2020-11-18 RX ADMIN — INSULIN LISPRO 6 UNITS: 100 INJECTION, SOLUTION INTRAVENOUS; SUBCUTANEOUS at 16:00

## 2020-11-18 RX ADMIN — GUAIFENESIN AND DEXTROMETHORPHAN 10 ML: 100; 10 SYRUP ORAL at 10:59

## 2020-11-18 RX ADMIN — GUAIFENESIN AND DEXTROMETHORPHAN 10 ML: 100; 10 SYRUP ORAL at 23:21

## 2020-11-18 ASSESSMENT — PAIN SCALES - GENERAL
PAINLEVEL_OUTOF10: 0
PAINLEVEL_OUTOF10: 0

## 2020-11-19 ENCOUNTER — APPOINTMENT (OUTPATIENT)
Dept: GENERAL RADIOLOGY | Age: 55
DRG: 004 | End: 2020-11-19
Attending: INTERNAL MEDICINE

## 2020-11-19 LAB
ALBUMIN SERPL-MCNC: 1.8 G/DL (ref 3.6–5.1)
ALBUMIN/GLOB SERPL: 0.4 {RATIO} (ref 1–2.4)
ALP SERPL-CCNC: 206 UNITS/L (ref 45–117)
ALT SERPL-CCNC: 48 UNITS/L
ANION GAP SERPL CALC-SCNC: 12 MMOL/L (ref 10–20)
ANION GAP SERPL CALC-SCNC: 12 MMOL/L (ref 10–20)
APTT PPP: 32 SEC (ref 22–30)
AST SERPL-CCNC: 151 UNITS/L
BASE DEFICIT BLDA-SCNC: 3 MMOL/L (ref 0–2)
BASE DEFICIT BLDA-SCNC: 3 MMOL/L (ref 0–2)
BASOPHILS # BLD: 0.1 K/MCL (ref 0–0.3)
BASOPHILS NFR BLD: 1 %
BDY SITE: ABNORMAL
BDY SITE: ABNORMAL
BILIRUB SERPL-MCNC: 1.8 MG/DL (ref 0.2–1)
BODY TEMPERATURE: 36 DEGREES
BODY TEMPERATURE: 36.4 DEGREES
BUN SERPL-MCNC: 11 MG/DL (ref 6–20)
BUN SERPL-MCNC: 8 MG/DL (ref 6–20)
BUN/CREAT SERPL: 15 (ref 7–25)
BUN/CREAT SERPL: 16 (ref 7–25)
CA-I BLDA-SCNC: 15 % (ref 15–23)
CA-I BLDA-SCNC: 15 % (ref 15–23)
CALCIUM SERPL-MCNC: 7.9 MG/DL (ref 8.4–10.2)
CALCIUM SERPL-MCNC: 8.6 MG/DL (ref 8.4–10.2)
CHLORIDE SERPL-SCNC: 96 MMOL/L (ref 98–107)
CHLORIDE SERPL-SCNC: 99 MMOL/L (ref 98–107)
CO2 SERPL-SCNC: 26 MMOL/L (ref 21–32)
CO2 SERPL-SCNC: 28 MMOL/L (ref 21–32)
COHGB MFR BLD: 2 %
COHGB MFR BLD: 2.3 %
CONDITION-RC: ABNORMAL
CONDITION-RC: ABNORMAL
CREAT SERPL-MCNC: 0.54 MG/DL (ref 0.51–0.95)
CREAT SERPL-MCNC: 0.67 MG/DL (ref 0.51–0.95)
D DIMER PPP FEU-MCNC: 4.05 MG/L (FEU)
DIFFERENTIAL METHOD BLD: ABNORMAL
EOSINOPHIL # BLD: 0.2 K/MCL (ref 0.1–0.5)
EOSINOPHIL NFR BLD: 1 %
ERYTHROCYTE [DISTWIDTH] IN BLOOD: 13.6 % (ref 11–15)
ERYTHROCYTE [DISTWIDTH] IN BLOOD: 13.7 % (ref 11–15)
GLOBULIN SER-MCNC: 4.9 G/DL (ref 2–4)
GLUCOSE BLDC GLUCOMTR-MCNC: 170 MG/DL (ref 70–99)
GLUCOSE BLDC GLUCOMTR-MCNC: 197 MG/DL (ref 70–99)
GLUCOSE BLDC GLUCOMTR-MCNC: 219 MG/DL (ref 70–99)
GLUCOSE BLDC GLUCOMTR-MCNC: 240 MG/DL (ref 70–99)
GLUCOSE SERPL-MCNC: 174 MG/DL (ref 65–99)
GLUCOSE SERPL-MCNC: 227 MG/DL (ref 65–99)
HCO3 BLDA-SCNC: 26 MMOL/L (ref 22–28)
HCO3 BLDA-SCNC: 28 MMOL/L (ref 22–28)
HCT VFR BLD CALC: 34 % (ref 36–46.5)
HCT VFR BLD CALC: 34.1 % (ref 36–46.5)
HCT VFR BLD CALC: 34.9 % (ref 36–46.5)
HCT VFR BLD CALC: 35 % (ref 36–46.5)
HGB BLD-MCNC: 11.1 G/DL (ref 12–15.5)
HGB BLD-MCNC: 11.2 G/DL (ref 12–15.5)
HGB BLD-MCNC: 11.6 G/DL (ref 12–15.5)
HGB BLD-MCNC: 11.7 G/DL (ref 12–15.5)
HOROWITZ INDEX BLD+IHG-RTO: 100 %
HOROWITZ INDEX BLD+IHG-RTO: 100 %
HOROWITZ INDEX BLDA+IHG-RTO: 105 MM[HG] (ref 300–500)
HOROWITZ INDEX BLDA+IHG-RTO: 90 MM[HG] (ref 300–500)
IMM GRANULOCYTES # BLD AUTO: 0.2 K/MCL (ref 0–0.2)
IMM GRANULOCYTES NFR BLD: 1 %
INR PPP: 1.2
LACTATE BLDA-MCNC: 1.7 MMOL/L
LACTATE BLDA-MCNC: 1.8 MMOL/L
LYMPHOCYTES # BLD: 1.7 K/MCL (ref 1–4)
LYMPHOCYTES NFR BLD: 10 %
MAGNESIUM SERPL-MCNC: 2.1 MG/DL (ref 1.7–2.4)
MCH RBC QN AUTO: 29.3 PG (ref 26–34)
MCH RBC QN AUTO: 29.8 PG (ref 26–34)
MCHC RBC AUTO-ENTMCNC: 32.6 G/DL (ref 32–36.5)
MCHC RBC AUTO-ENTMCNC: 33.5 G/DL (ref 32–36.5)
MCV RBC AUTO: 89 FL (ref 78–100)
MCV RBC AUTO: 90 FL (ref 78–100)
METHGB MFR BLD: 0.3 %
METHGB MFR BLD: 0.4 %
MONOCYTES # BLD: 1.5 K/MCL (ref 0.3–0.9)
MONOCYTES NFR BLD: 8 %
NEUTROPHILS # BLD: 14 K/MCL (ref 1.8–7.7)
NEUTROPHILS NFR BLD: 79 %
NRBC BLD MANUAL-RTO: 0 /100 WBC
NRBC BLD MANUAL-RTO: 0 /100 WBC
OXYHGB MFR BLD: 93.5 % (ref 94–98)
OXYHGB MFR BLD: 95.6 % (ref 94–98)
PCO2 BLDA: 67 MM HG (ref 32–45)
PCO2 BLDA: 81 MM HG (ref 32–45)
PH BLDA: 7.14 UNITS (ref 7.35–7.45)
PH BLDA: 7.19 UNITS (ref 7.35–7.45)
PHOSPHATE SERPL-MCNC: 5.6 MG/DL (ref 2.4–4.7)
PLATELET # BLD: 151 K/MCL (ref 140–450)
PLATELET # BLD: 232 K/MCL (ref 140–450)
PO2 BLDA: 87 MM HG (ref 83–108)
PO2 BLDA: 99 MM HG (ref 83–108)
POTASSIUM SERPL-SCNC: 4 MMOL/L (ref 3.4–5.1)
POTASSIUM SERPL-SCNC: 4.5 MMOL/L (ref 3.4–5.1)
PROCALCITONIN SERPL IA-MCNC: 1.77 NG/ML
PROT SERPL-MCNC: 6.7 G/DL (ref 6.4–8.2)
PROTHROMBIN TIME: 12.6 SEC (ref 9.7–11.8)
RBC # BLD: 3.79 MIL/MCL (ref 4–5.2)
RBC # BLD: 3.92 MIL/MCL (ref 4–5.2)
SAO2 % BLDA: 96 % (ref 95–99)
SAO2 % BLDA: 98 % (ref 95–99)
SODIUM SERPL-SCNC: 131 MMOL/L (ref 135–145)
SODIUM SERPL-SCNC: 133 MMOL/L (ref 135–145)
TROPONIN I SERPL HS-MCNC: <0.02 NG/ML
WBC # BLD: 13.2 K/MCL (ref 4.2–11)
WBC # BLD: 17.6 K/MCL (ref 4.2–11)

## 2020-11-19 PROCEDURE — 83735 ASSAY OF MAGNESIUM: CPT

## 2020-11-19 PROCEDURE — 94002 VENT MGMT INPAT INIT DAY: CPT

## 2020-11-19 PROCEDURE — 99233 SBSQ HOSP IP/OBS HIGH 50: CPT | Performed by: FAMILY MEDICINE

## 2020-11-19 PROCEDURE — 85027 COMPLETE CBC AUTOMATED: CPT

## 2020-11-19 PROCEDURE — 84484 ASSAY OF TROPONIN QUANT: CPT

## 2020-11-19 PROCEDURE — 13003292 HB HHF OXYGEN THERAPY DAILY

## 2020-11-19 PROCEDURE — 10002800 HB RX 250 W HCPCS: Performed by: INTERNAL MEDICINE

## 2020-11-19 PROCEDURE — 10002800 HB RX 250 W HCPCS

## 2020-11-19 PROCEDURE — 85025 COMPLETE CBC W/AUTO DIFF WBC: CPT

## 2020-11-19 PROCEDURE — 10004281 HB COUNTER-STAFF TIME PER 15 MIN

## 2020-11-19 PROCEDURE — 5A1955Z RESPIRATORY VENTILATION, GREATER THAN 96 CONSECUTIVE HOURS: ICD-10-PCS | Performed by: INTERNAL MEDICINE

## 2020-11-19 PROCEDURE — 71045 X-RAY EXAM CHEST 1 VIEW: CPT

## 2020-11-19 PROCEDURE — 85730 THROMBOPLASTIN TIME PARTIAL: CPT

## 2020-11-19 PROCEDURE — 10004651 HB RX, NO CHARGE ITEM: Performed by: INTERNAL MEDICINE

## 2020-11-19 PROCEDURE — 10002803 HB RX 637: Performed by: INTERNAL MEDICINE

## 2020-11-19 PROCEDURE — 13003290 HB INO PER HOUR

## 2020-11-19 PROCEDURE — 85018 HEMOGLOBIN: CPT

## 2020-11-19 PROCEDURE — 0BH18EZ INSERTION OF ENDOTRACHEAL AIRWAY INTO TRACHEA, VIA NATURAL OR ARTIFICIAL OPENING ENDOSCOPIC: ICD-10-PCS | Performed by: INTERNAL MEDICINE

## 2020-11-19 PROCEDURE — 5A12012 PERFORMANCE OF CARDIAC OUTPUT, SINGLE, MANUAL: ICD-10-PCS | Performed by: INTERNAL MEDICINE

## 2020-11-19 PROCEDURE — 84145 PROCALCITONIN (PCT): CPT

## 2020-11-19 PROCEDURE — 0DH67UZ INSERTION OF FEEDING DEVICE INTO STOMACH, VIA NATURAL OR ARTIFICIAL OPENING: ICD-10-PCS | Performed by: INTERNAL MEDICINE

## 2020-11-19 PROCEDURE — 10002807 HB RX 258: Performed by: INTERNAL MEDICINE

## 2020-11-19 PROCEDURE — 80048 BASIC METABOLIC PNL TOTAL CA: CPT

## 2020-11-19 PROCEDURE — 03HY32Z INSERTION OF MONITORING DEVICE INTO UPPER ARTERY, PERCUTANEOUS APPROACH: ICD-10-PCS | Performed by: INTERNAL MEDICINE

## 2020-11-19 PROCEDURE — 84100 ASSAY OF PHOSPHORUS: CPT

## 2020-11-19 PROCEDURE — 85610 PROTHROMBIN TIME: CPT

## 2020-11-19 PROCEDURE — 93005 ELECTROCARDIOGRAM TRACING: CPT | Performed by: NURSE PRACTITIONER

## 2020-11-19 PROCEDURE — 83050 HGB METHEMOGLOBIN QUAN: CPT

## 2020-11-19 PROCEDURE — 10002801 HB RX 250 W/O HCPCS

## 2020-11-19 PROCEDURE — 80053 COMPREHEN METABOLIC PANEL: CPT

## 2020-11-19 PROCEDURE — 10002803 HB RX 637: Performed by: FAMILY MEDICINE

## 2020-11-19 PROCEDURE — 85379 FIBRIN DEGRADATION QUANT: CPT

## 2020-11-19 PROCEDURE — 83605 ASSAY OF LACTIC ACID: CPT

## 2020-11-19 PROCEDURE — 82805 BLOOD GASES W/O2 SATURATION: CPT

## 2020-11-19 PROCEDURE — 10003585 HB ROOM CHARGE INTERMEDIATE CARE

## 2020-11-19 RX ORDER — HEPARIN SODIUM 10000 [USP'U]/100ML
0-30 INJECTION, SOLUTION INTRAVENOUS CONTINUOUS
Status: DISCONTINUED | OUTPATIENT
Start: 2020-11-19 | End: 2020-12-01

## 2020-11-19 RX ORDER — MINERAL OIL AND WHITE PETROLATUM 150; 830 MG/G; MG/G
1 OINTMENT OPHTHALMIC
Status: DISCONTINUED | OUTPATIENT
Start: 2020-11-19 | End: 2020-12-02 | Stop reason: HOSPADM

## 2020-11-19 RX ORDER — NOREPINEPHRINE BITARTRATE 0.03 MG/ML
INJECTION, SOLUTION INTRAVENOUS
Status: COMPLETED
Start: 2020-11-19 | End: 2020-11-19

## 2020-11-19 RX ORDER — ADENOSINE 3 MG/ML
INJECTION, SOLUTION INTRAVENOUS
Status: COMPLETED
Start: 2020-11-19 | End: 2020-11-19

## 2020-11-19 RX ORDER — NOREPINEPHRINE BITARTRATE 0.03 MG/ML
0-199 INJECTION, SOLUTION INTRAVENOUS CONTINUOUS
Status: DISCONTINUED | OUTPATIENT
Start: 2020-11-19 | End: 2020-11-26

## 2020-11-19 RX ORDER — SODIUM CHLORIDE 9 MG/ML
INJECTION, SOLUTION INTRAVENOUS CONTINUOUS PRN
Status: DISCONTINUED | OUTPATIENT
Start: 2020-11-19 | End: 2020-12-02 | Stop reason: HOSPADM

## 2020-11-19 RX ORDER — LORAZEPAM 2 MG/ML
1 CONCENTRATE ORAL EVERY 8 HOURS PRN
Status: DISCONTINUED | OUTPATIENT
Start: 2020-11-19 | End: 2020-11-19 | Stop reason: SDUPTHER

## 2020-11-19 RX ORDER — ACETAMINOPHEN 160 MG/5ML
650 LIQUID ORAL EVERY 4 HOURS PRN
Status: DISCONTINUED | OUTPATIENT
Start: 2020-11-19 | End: 2020-12-01

## 2020-11-19 RX ORDER — CHLORHEXIDINE GLUCONATE ORAL RINSE 1.2 MG/ML
15 SOLUTION DENTAL EVERY 12 HOURS SCHEDULED
Status: DISCONTINUED | OUTPATIENT
Start: 2020-11-19 | End: 2020-12-02 | Stop reason: HOSPADM

## 2020-11-19 RX ORDER — MIDAZOLAM HYDROCHLORIDE 1 MG/ML
INJECTION, SOLUTION INTRAMUSCULAR; INTRAVENOUS
Status: COMPLETED
Start: 2020-11-19 | End: 2020-11-19

## 2020-11-19 RX ORDER — SODIUM CHLORIDE 9 MG/ML
INJECTION, SOLUTION INTRAVENOUS CONTINUOUS PRN
Status: DISCONTINUED | OUTPATIENT
Start: 2020-11-19 | End: 2020-12-02

## 2020-11-19 RX ORDER — SODIUM CHLORIDE, SODIUM LACTATE, POTASSIUM CHLORIDE, CALCIUM CHLORIDE 600; 310; 30; 20 MG/100ML; MG/100ML; MG/100ML; MG/100ML
INJECTION, SOLUTION INTRAVENOUS CONTINUOUS
Status: DISCONTINUED | OUTPATIENT
Start: 2020-11-19 | End: 2020-11-20

## 2020-11-19 RX ORDER — LORAZEPAM 2 MG/1
2 TABLET ORAL EVERY 8 HOURS PRN
Status: DISCONTINUED | OUTPATIENT
Start: 2020-11-19 | End: 2020-11-19 | Stop reason: CLARIF

## 2020-11-19 RX ORDER — CHLORHEXIDINE GLUCONATE ORAL RINSE 1.2 MG/ML
15 SOLUTION DENTAL PRN
Status: DISCONTINUED | OUTPATIENT
Start: 2020-11-19 | End: 2020-12-02 | Stop reason: HOSPADM

## 2020-11-19 RX ORDER — LORAZEPAM 1 MG/1
1 TABLET ORAL EVERY 8 HOURS PRN
Status: DISCONTINUED | OUTPATIENT
Start: 2020-11-19 | End: 2020-11-19

## 2020-11-19 RX ADMIN — Medication 10 MCG/MIN: at 22:33

## 2020-11-19 RX ADMIN — PROPOFOL 10 MCG/KG/MIN: 10 INJECTION, EMULSION INTRAVENOUS at 22:26

## 2020-11-19 RX ADMIN — LORAZEPAM 1 MG: 1 TABLET ORAL at 19:01

## 2020-11-19 RX ADMIN — SODIUM CHLORIDE, PRESERVATIVE FREE 2 ML: 5 INJECTION INTRAVENOUS at 22:41

## 2020-11-19 RX ADMIN — CHLORHEXIDINE GLUCONATE 15 ML: 1.2 RINSE ORAL at 22:41

## 2020-11-19 RX ADMIN — GUAIFENESIN AND DEXTROMETHORPHAN 10 ML: 100; 10 SYRUP ORAL at 18:49

## 2020-11-19 RX ADMIN — Medication 100 MCG/HR: at 22:25

## 2020-11-19 RX ADMIN — SODIUM CHLORIDE, PRESERVATIVE FREE 2 ML: 5 INJECTION INTRAVENOUS at 08:35

## 2020-11-19 RX ADMIN — ADENOSINE 6 MG: 3 INJECTION, SOLUTION INTRAVENOUS at 22:00

## 2020-11-19 RX ADMIN — APIXABAN 2.5 MG: 5 TABLET, FILM COATED ORAL at 08:32

## 2020-11-19 RX ADMIN — HEPARIN SODIUM 12 UNITS/KG/HR: 10000 INJECTION, SOLUTION INTRAVENOUS at 22:48

## 2020-11-19 RX ADMIN — INSULIN LISPRO 2 UNITS: 100 INJECTION, SOLUTION INTRAVENOUS; SUBCUTANEOUS at 08:33

## 2020-11-19 RX ADMIN — CEFEPIME 2000 MG: 2 INJECTION, POWDER, FOR SOLUTION INTRAVENOUS at 23:30

## 2020-11-19 RX ADMIN — FENTANYL CITRATE 50 MCG: 50 INJECTION INTRAMUSCULAR; INTRAVENOUS at 22:31

## 2020-11-19 RX ADMIN — MIDAZOLAM HYDROCHLORIDE 2 MG: 1 INJECTION, SOLUTION INTRAMUSCULAR; INTRAVENOUS at 22:31

## 2020-11-19 RX ADMIN — INSULIN LISPRO 4 UNITS: 100 INJECTION, SOLUTION INTRAVENOUS; SUBCUTANEOUS at 19:47

## 2020-11-19 RX ADMIN — GUAIFENESIN AND DEXTROMETHORPHAN 10 ML: 100; 10 SYRUP ORAL at 12:31

## 2020-11-19 RX ADMIN — MINERAL OIL, PETROLATUM 1 APPLICATION.: 425; 573 OINTMENT OPHTHALMIC at 22:41

## 2020-11-19 RX ADMIN — PANTOPRAZOLE SODIUM 40 MG: 40 TABLET, DELAYED RELEASE ORAL at 06:15

## 2020-11-19 RX ADMIN — INSULIN LISPRO 2 UNITS: 100 INJECTION, SOLUTION INTRAVENOUS; SUBCUTANEOUS at 14:53

## 2020-11-19 RX ADMIN — SODIUM CHLORIDE, POTASSIUM CHLORIDE, SODIUM LACTATE AND CALCIUM CHLORIDE 1000 ML: 600; 310; 30; 20 INJECTION, SOLUTION INTRAVENOUS at 22:56

## 2020-11-19 ASSESSMENT — PAIN SCALES - BEHAVIORAL PAIN SCALE (BPS): BPS_SCORE: 3

## 2020-11-19 ASSESSMENT — PAIN SCALES - GENERAL: PAINLEVEL_OUTOF10: 2

## 2020-11-20 ENCOUNTER — APPOINTMENT (OUTPATIENT)
Dept: GENERAL RADIOLOGY | Age: 55
DRG: 004 | End: 2020-11-20
Attending: INTERNAL MEDICINE

## 2020-11-20 LAB
ANION GAP SERPL CALC-SCNC: 15 MMOL/L (ref 10–20)
APTT PPP: 46 SEC (ref 22–30)
APTT PPP: 47 SEC (ref 22–30)
BASE DEFICIT BLDA-SCNC: 3 MMOL/L (ref 0–2)
BASE DEFICIT BLDA-SCNC: 4 MMOL/L (ref 0–2)
BASE DEFICIT BLDA-SCNC: 6 MMOL/L (ref 0–2)
BDY SITE: ABNORMAL
BODY TEMPERATURE: 35.6 DEGREES
BODY TEMPERATURE: 36.4 DEGREES
BODY TEMPERATURE: 37.3 DEGREES
BUN SERPL-MCNC: 17 MG/DL (ref 6–20)
BUN/CREAT SERPL: 20 (ref 7–25)
CA-I BLDA-SCNC: 14 % (ref 15–23)
CALCIUM SERPL-MCNC: 8.2 MG/DL (ref 8.4–10.2)
CHLORIDE SERPL-SCNC: 98 MMOL/L (ref 98–107)
CO2 SERPL-SCNC: 22 MMOL/L (ref 21–32)
COHGB MFR BLD: 1.9 %
CONDITION-RC: ABNORMAL
CREAT SERPL-MCNC: 0.84 MG/DL (ref 0.51–0.95)
ERYTHROCYTE [DISTWIDTH] IN BLOOD: 13.8 % (ref 11–15)
GLUCOSE BLDC GLUCOMTR-MCNC: 160 MG/DL (ref 70–99)
GLUCOSE BLDC GLUCOMTR-MCNC: 168 MG/DL (ref 70–99)
GLUCOSE BLDC GLUCOMTR-MCNC: 226 MG/DL (ref 70–99)
GLUCOSE BLDC GLUCOMTR-MCNC: 231 MG/DL (ref 70–99)
GLUCOSE BLDC GLUCOMTR-MCNC: 266 MG/DL (ref 70–99)
GLUCOSE SERPL-MCNC: 243 MG/DL (ref 65–99)
HCO3 BLDA-SCNC: 21 MMOL/L (ref 22–28)
HCO3 BLDA-SCNC: 24 MMOL/L (ref 22–28)
HCO3 BLDA-SCNC: 24 MMOL/L (ref 22–28)
HCT VFR BLD CALC: 29.5 % (ref 36–46.5)
HCT VFR BLD CALC: 32 % (ref 36–46.5)
HGB BLD-MCNC: 10.8 G/DL (ref 12–15.5)
HGB BLD-MCNC: 9.8 G/DL (ref 12–15.5)
HOROWITZ INDEX BLD+IHG-RTO: 100 %
HOROWITZ INDEX BLDA+IHG-RTO: 128 MM[HG] (ref 300–500)
HOROWITZ INDEX BLDA+IHG-RTO: 79 MM[HG] (ref 300–500)
HOROWITZ INDEX BLDA+IHG-RTO: 86 MM[HG] (ref 300–500)
LACTATE BLDA-MCNC: 1.8 MMOL/L
LACTATE BLDA-MCNC: 2.3 MMOL/L
MCH RBC QN AUTO: 29.6 PG (ref 26–34)
MCHC RBC AUTO-ENTMCNC: 33.2 G/DL (ref 32–36.5)
MCV RBC AUTO: 89.1 FL (ref 78–100)
METHGB MFR BLD: 0.9 %
NRBC BLD MANUAL-RTO: 1 /100 WBC
OXYHGB MFR BLD: 93.6 % (ref 94–98)
PCO2 BLDA: 42 MM HG (ref 32–45)
PCO2 BLDA: 49 MM HG (ref 32–45)
PCO2 BLDA: 52 MM HG (ref 32–45)
PH BLDA: 7.27 UNITS (ref 7.35–7.45)
PH BLDA: 7.3 UNITS (ref 7.35–7.45)
PH BLDA: 7.3 UNITS (ref 7.35–7.45)
PLATELET # BLD: 206 K/MCL (ref 140–450)
PO2 BLDA: 120 MM HG (ref 83–108)
PO2 BLDA: 81 MM HG (ref 83–108)
PO2 BLDA: 83 MM HG (ref 83–108)
POTASSIUM SERPL-SCNC: 4.8 MMOL/L (ref 3.4–5.1)
RBC # BLD: 3.31 MIL/MCL (ref 4–5.2)
SAO2 % BLDA: 95 % (ref 95–99)
SAO2 % BLDA: 96 % (ref 95–99)
SAO2 % BLDA: 99 % (ref 95–99)
SODIUM SERPL-SCNC: 130 MMOL/L (ref 135–145)
WBC # BLD: 14.2 K/MCL (ref 4.2–11)

## 2020-11-20 PROCEDURE — 10002800 HB RX 250 W HCPCS: Performed by: INTERNAL MEDICINE

## 2020-11-20 PROCEDURE — 13003290 HB INO PER HOUR

## 2020-11-20 PROCEDURE — 10003585 HB ROOM CHARGE INTERMEDIATE CARE

## 2020-11-20 PROCEDURE — P9045 ALBUMIN (HUMAN), 5%, 250 ML: HCPCS | Performed by: INTERNAL MEDICINE

## 2020-11-20 PROCEDURE — 10002801 HB RX 250 W/O HCPCS: Performed by: INTERNAL MEDICINE

## 2020-11-20 PROCEDURE — 80048 BASIC METABOLIC PNL TOTAL CA: CPT

## 2020-11-20 PROCEDURE — 10002803 HB RX 637: Performed by: INTERNAL MEDICINE

## 2020-11-20 PROCEDURE — 10004651 HB RX, NO CHARGE ITEM: Performed by: INTERNAL MEDICINE

## 2020-11-20 PROCEDURE — 10004281 HB COUNTER-STAFF TIME PER 15 MIN

## 2020-11-20 PROCEDURE — 83605 ASSAY OF LACTIC ACID: CPT

## 2020-11-20 PROCEDURE — P9047 ALBUMIN (HUMAN), 25%, 50ML: HCPCS | Performed by: INTERNAL MEDICINE

## 2020-11-20 PROCEDURE — 71045 X-RAY EXAM CHEST 1 VIEW: CPT

## 2020-11-20 PROCEDURE — 85027 COMPLETE CBC AUTOMATED: CPT

## 2020-11-20 PROCEDURE — 85730 THROMBOPLASTIN TIME PARTIAL: CPT

## 2020-11-20 PROCEDURE — 10002807 HB RX 258: Performed by: INTERNAL MEDICINE

## 2020-11-20 PROCEDURE — 94003 VENT MGMT INPAT SUBQ DAY: CPT

## 2020-11-20 PROCEDURE — 83050 HGB METHEMOGLOBIN QUAN: CPT

## 2020-11-20 PROCEDURE — 82805 BLOOD GASES W/O2 SATURATION: CPT

## 2020-11-20 PROCEDURE — 99291 CRITICAL CARE FIRST HOUR: CPT | Performed by: FAMILY MEDICINE

## 2020-11-20 RX ORDER — ALBUMIN (HUMAN) 12.5 G/50ML
25 SOLUTION INTRAVENOUS ONCE
Status: COMPLETED | OUTPATIENT
Start: 2020-11-20 | End: 2020-11-20

## 2020-11-20 RX ORDER — ROCURONIUM BROMIDE 10 MG/ML
0.6 INJECTION, SOLUTION INTRAVENOUS ONCE
Status: COMPLETED | OUTPATIENT
Start: 2020-11-20 | End: 2020-11-20

## 2020-11-20 RX ORDER — INSULIN GLARGINE 100 [IU]/ML
26 INJECTION, SOLUTION SUBCUTANEOUS NIGHTLY
Status: DISCONTINUED | OUTPATIENT
Start: 2020-11-20 | End: 2020-11-22

## 2020-11-20 RX ORDER — ALBUMIN, HUMAN INJ 5% 5 %
25 SOLUTION INTRAVENOUS ONCE
Status: COMPLETED | OUTPATIENT
Start: 2020-11-20 | End: 2020-11-20

## 2020-11-20 RX ORDER — FUROSEMIDE 10 MG/ML
40 INJECTION INTRAMUSCULAR; INTRAVENOUS ONCE
Status: COMPLETED | OUTPATIENT
Start: 2020-11-20 | End: 2020-11-20

## 2020-11-20 RX ORDER — ROCURONIUM BROMIDE 10 MG/ML
0.6 INJECTION, SOLUTION INTRAVENOUS EVERY 4 HOURS PRN
Status: DISCONTINUED | OUTPATIENT
Start: 2020-11-20 | End: 2020-12-01

## 2020-11-20 RX ADMIN — CEFEPIME 2000 MG: 2 INJECTION, POWDER, FOR SOLUTION INTRAVENOUS at 06:09

## 2020-11-20 RX ADMIN — INSULIN LISPRO 6 UNITS: 100 INJECTION, SOLUTION INTRAVENOUS; SUBCUTANEOUS at 01:00

## 2020-11-20 RX ADMIN — MINERAL OIL, PETROLATUM 1 APPLICATION.: 425; 573 OINTMENT OPHTHALMIC at 03:59

## 2020-11-20 RX ADMIN — VASOPRESSIN 0.04 UNITS/MIN: 20 INJECTION INTRAVENOUS at 00:32

## 2020-11-20 RX ADMIN — PROPOFOL 40 MCG/KG/MIN: 10 INJECTION, EMULSION INTRAVENOUS at 18:54

## 2020-11-20 RX ADMIN — HYDROCORTISONE SODIUM SUCCINATE 300 MG: 250 INJECTION, POWDER, FOR SOLUTION INTRAMUSCULAR; INTRAVENOUS at 22:24

## 2020-11-20 RX ADMIN — PROPOFOL 40 MCG/KG/MIN: 10 INJECTION, EMULSION INTRAVENOUS at 23:08

## 2020-11-20 RX ADMIN — CHLORHEXIDINE GLUCONATE 15 ML: 1.2 RINSE ORAL at 20:54

## 2020-11-20 RX ADMIN — MINERAL OIL, PETROLATUM 1 APPLICATION.: 425; 573 OINTMENT OPHTHALMIC at 08:20

## 2020-11-20 RX ADMIN — PANTOPRAZOLE 40 MG: 40 TABLET, DELAYED RELEASE ORAL at 08:20

## 2020-11-20 RX ADMIN — PROPOFOL 30 MCG/KG/MIN: 10 INJECTION, EMULSION INTRAVENOUS at 14:14

## 2020-11-20 RX ADMIN — Medication 125 MCG/HR: at 17:14

## 2020-11-20 RX ADMIN — INSULIN GLARGINE 26 UNITS: 100 INJECTION, SOLUTION SUBCUTANEOUS at 21:01

## 2020-11-20 RX ADMIN — INSULIN LISPRO 2 UNITS: 100 INJECTION, SOLUTION INTRAVENOUS; SUBCUTANEOUS at 17:11

## 2020-11-20 RX ADMIN — FUROSEMIDE 40 MG: 10 INJECTION, SOLUTION INTRAMUSCULAR; INTRAVENOUS at 20:51

## 2020-11-20 RX ADMIN — SODIUM CHLORIDE, PRESERVATIVE FREE 2 ML: 5 INJECTION INTRAVENOUS at 20:53

## 2020-11-20 RX ADMIN — CEFEPIME 2000 MG: 2 INJECTION, POWDER, FOR SOLUTION INTRAVENOUS at 20:51

## 2020-11-20 RX ADMIN — SODIUM CHLORIDE, PRESERVATIVE FREE 2 ML: 5 INJECTION INTRAVENOUS at 08:20

## 2020-11-20 RX ADMIN — ROCURONIUM BROMIDE 50 MG: 10 INJECTION INTRAVENOUS at 22:24

## 2020-11-20 RX ADMIN — INSULIN LISPRO 2 UNITS: 100 INJECTION, SOLUTION INTRAVENOUS; SUBCUTANEOUS at 21:02

## 2020-11-20 RX ADMIN — ALBUMIN (HUMAN) 25 G: 12.5 SOLUTION INTRAVENOUS at 03:55

## 2020-11-20 RX ADMIN — MINERAL OIL, PETROLATUM 1 APPLICATION.: 425; 573 OINTMENT OPHTHALMIC at 11:28

## 2020-11-20 RX ADMIN — INSULIN LISPRO 2 UNITS: 100 INJECTION, SOLUTION INTRAVENOUS; SUBCUTANEOUS at 11:28

## 2020-11-20 RX ADMIN — MINERAL OIL, PETROLATUM 1 APPLICATION.: 425; 573 OINTMENT OPHTHALMIC at 20:55

## 2020-11-20 RX ADMIN — CEFEPIME 2000 MG: 2 INJECTION, POWDER, FOR SOLUTION INTRAVENOUS at 14:21

## 2020-11-20 RX ADMIN — Medication 18 MCG/MIN: at 22:29

## 2020-11-20 RX ADMIN — VASOPRESSIN 0.04 UNITS/MIN: 20 INJECTION INTRAVENOUS at 22:26

## 2020-11-20 RX ADMIN — VASOPRESSIN 0.04 UNITS/MIN: 20 INJECTION INTRAVENOUS at 08:24

## 2020-11-20 RX ADMIN — Medication 16 MCG/MIN: at 14:45

## 2020-11-20 RX ADMIN — SODIUM CHLORIDE, SODIUM LACTATE, POTASSIUM CHLORIDE, AND CALCIUM CHLORIDE: .6; .31; .03; .02 INJECTION, SOLUTION INTRAVENOUS at 00:33

## 2020-11-20 RX ADMIN — MINERAL OIL, PETROLATUM 1 APPLICATION.: 425; 573 OINTMENT OPHTHALMIC at 00:17

## 2020-11-20 RX ADMIN — MINERAL OIL, PETROLATUM 1 APPLICATION.: 425; 573 OINTMENT OPHTHALMIC at 17:14

## 2020-11-20 RX ADMIN — INSULIN GLARGINE 20 UNITS: 100 INJECTION, SOLUTION SUBCUTANEOUS at 01:20

## 2020-11-20 RX ADMIN — SODIUM CHLORIDE, SODIUM LACTATE, POTASSIUM CHLORIDE, AND CALCIUM CHLORIDE: .6; .31; .03; .02 INJECTION, SOLUTION INTRAVENOUS at 17:12

## 2020-11-20 RX ADMIN — VASOPRESSIN 0.04 UNITS/MIN: 20 INJECTION INTRAVENOUS at 14:43

## 2020-11-20 RX ADMIN — Medication 16 MCG/MIN: at 07:00

## 2020-11-20 RX ADMIN — ALBUMIN (HUMAN) 25 G: 0.25 INJECTION, SOLUTION INTRAVENOUS at 20:46

## 2020-11-20 RX ADMIN — PROPOFOL 30 MCG/KG/MIN: 10 INJECTION, EMULSION INTRAVENOUS at 05:57

## 2020-11-20 RX ADMIN — CHLORHEXIDINE GLUCONATE 15 ML: 1.2 RINSE ORAL at 08:21

## 2020-11-20 RX ADMIN — INSULIN LISPRO 4 UNITS: 100 INJECTION, SOLUTION INTRAVENOUS; SUBCUTANEOUS at 06:11

## 2020-11-20 ASSESSMENT — PAIN SCALES - BEHAVIORAL PAIN SCALE (BPS)
BPS_SCORE: 4
BPS_SCORE: 5
BPS_SCORE: 3
BPS_SCORE: 5

## 2020-11-21 ENCOUNTER — APPOINTMENT (OUTPATIENT)
Dept: GENERAL RADIOLOGY | Age: 55
DRG: 004 | End: 2020-11-21
Attending: EMERGENCY MEDICINE

## 2020-11-21 LAB
ALBUMIN SERPL-MCNC: 2.3 G/DL (ref 3.6–5.1)
ALBUMIN SERPL-MCNC: 2.3 G/DL (ref 3.6–5.1)
ALBUMIN/GLOB SERPL: 0.5 {RATIO} (ref 1–2.4)
ALBUMIN/GLOB SERPL: 0.5 {RATIO} (ref 1–2.4)
ALP SERPL-CCNC: 231 UNITS/L (ref 45–117)
ALP SERPL-CCNC: 251 UNITS/L (ref 45–117)
ALT SERPL-CCNC: 145 UNITS/L
ALT SERPL-CCNC: 305 UNITS/L
ANION GAP SERPL CALC-SCNC: 12 MMOL/L (ref 10–20)
ANION GAP SERPL CALC-SCNC: 18 MMOL/L (ref 10–20)
APTT PPP: 50 SEC (ref 22–30)
AST SERPL-CCNC: 506 UNITS/L
AST SERPL-CCNC: 657 UNITS/L
BASE DEFICIT BLDA-SCNC: 6 MMOL/L (ref 0–2)
BASE DEFICIT BLDA-SCNC: 7 MMOL/L (ref 0–2)
BASE EXCESS BLDA CALC-SCNC: 7 MMOL/L (ref 0–3)
BASOPHILS # BLD: 0.1 K/MCL (ref 0–0.3)
BASOPHILS NFR BLD: 1 %
BDY SITE: ABNORMAL
BILIRUB SERPL-MCNC: 1.3 MG/DL (ref 0.2–1)
BILIRUB SERPL-MCNC: 2.3 MG/DL (ref 0.2–1)
BODY TEMPERATURE: 36.2 DEGREES
BODY TEMPERATURE: 36.4 DEGREES
BODY TEMPERATURE: 36.5 DEGREES
BUN SERPL-MCNC: 19 MG/DL (ref 6–20)
BUN SERPL-MCNC: 25 MG/DL (ref 6–20)
BUN/CREAT SERPL: 22 (ref 7–25)
BUN/CREAT SERPL: 26 (ref 7–25)
CA-I BLDA-SCNC: 14 % (ref 15–23)
CA-I BLDA-SCNC: 14 % (ref 15–23)
CALCIUM SERPL-MCNC: 8.2 MG/DL (ref 8.4–10.2)
CALCIUM SERPL-MCNC: 8.4 MG/DL (ref 8.4–10.2)
CHLORIDE SERPL-SCNC: 101 MMOL/L (ref 98–107)
CHLORIDE SERPL-SCNC: 96 MMOL/L (ref 98–107)
CO2 SERPL-SCNC: 20 MMOL/L (ref 21–32)
CO2 SERPL-SCNC: 30 MMOL/L (ref 21–32)
COHGB MFR BLD: 1.4 %
COHGB MFR BLD: 1.9 %
CONDITION-RC: ABNORMAL
CREAT SERPL-MCNC: 0.85 MG/DL (ref 0.51–0.95)
CREAT SERPL-MCNC: 0.96 MG/DL (ref 0.51–0.95)
D DIMER PPP FEU-MCNC: 2.66 MG/L (FEU)
DIFFERENTIAL METHOD BLD: ABNORMAL
EOSINOPHIL # BLD: 0 K/MCL (ref 0.1–0.5)
EOSINOPHIL NFR BLD: 0 %
ERYTHROCYTE [DISTWIDTH] IN BLOOD: 14.1 % (ref 11–15)
GLOBULIN SER-MCNC: 4.4 G/DL (ref 2–4)
GLOBULIN SER-MCNC: 4.5 G/DL (ref 2–4)
GLUCOSE BLDC GLUCOMTR-MCNC: 161 MG/DL (ref 70–99)
GLUCOSE BLDC GLUCOMTR-MCNC: 216 MG/DL (ref 70–99)
GLUCOSE BLDC GLUCOMTR-MCNC: 293 MG/DL (ref 70–99)
GLUCOSE BLDC GLUCOMTR-MCNC: 306 MG/DL (ref 70–99)
GLUCOSE BLDC GLUCOMTR-MCNC: 347 MG/DL (ref 70–99)
GLUCOSE SERPL-MCNC: 216 MG/DL (ref 65–99)
GLUCOSE SERPL-MCNC: 313 MG/DL (ref 65–99)
HCO3 BLDA-SCNC: 21 MMOL/L (ref 22–28)
HCO3 BLDA-SCNC: 22 MMOL/L (ref 22–28)
HCO3 BLDA-SCNC: 33 MMOL/L (ref 22–28)
HCT VFR BLD CALC: 31 % (ref 36–46.5)
HCT VFR BLD CALC: 31.7 % (ref 36–46.5)
HCT VFR BLD CALC: 32 % (ref 36–46.5)
HGB BLD-MCNC: 10.1 G/DL (ref 12–15.5)
HGB BLD-MCNC: 10.4 G/DL (ref 12–15.5)
HGB BLD-MCNC: 10.5 G/DL (ref 12–15.5)
HOROWITZ INDEX BLD+IHG-RTO: 56 %
HOROWITZ INDEX BLD+IHG-RTO: 60 %
HOROWITZ INDEX BLDA+IHG-RTO: 129 MM[HG] (ref 300–500)
HOROWITZ INDEX BLDA+IHG-RTO: 132 MM[HG] (ref 300–500)
IMM GRANULOCYTES # BLD AUTO: 0.1 K/MCL (ref 0–0.2)
IMM GRANULOCYTES NFR BLD: 1 %
LACTATE BLDA-MCNC: 1.9 MMOL/L
LYMPHOCYTES # BLD: 0.4 K/MCL (ref 1–4)
LYMPHOCYTES NFR BLD: 3 %
MAGNESIUM SERPL-MCNC: 1.9 MG/DL (ref 1.7–2.4)
MAGNESIUM SERPL-MCNC: 2 MG/DL (ref 1.7–2.4)
MCH RBC QN AUTO: 29.1 PG (ref 26–34)
MCHC RBC AUTO-ENTMCNC: 31.9 G/DL (ref 32–36.5)
MCV RBC AUTO: 91.4 FL (ref 78–100)
METHGB MFR BLD: 0.8 %
METHGB MFR BLD: 0.9 %
MONOCYTES # BLD: 0.3 K/MCL (ref 0.3–0.9)
MONOCYTES NFR BLD: 2 %
NEUTROPHILS # BLD: 12.5 K/MCL (ref 1.8–7.7)
NEUTROPHILS NFR BLD: 93 %
NRBC BLD MANUAL-RTO: 0 /100 WBC
OXYHGB MFR BLD: 94 % (ref 94–98)
OXYHGB MFR BLD: 96.5 % (ref 94–98)
PCO2 BLDA: 44 MM HG (ref 32–45)
PCO2 BLDA: 48 MM HG (ref 32–45)
PCO2 BLDA: 52 MM HG (ref 32–45)
PH BLDA: 7.23 UNITS (ref 7.35–7.45)
PH BLDA: 7.27 UNITS (ref 7.35–7.45)
PH BLDA: 7.44 UNITS (ref 7.35–7.45)
PHOSPHATE SERPL-MCNC: 3.4 MG/DL (ref 2.4–4.7)
PHOSPHATE SERPL-MCNC: 4.5 MG/DL (ref 2.4–4.7)
PLATELET # BLD: 206 K/MCL (ref 140–450)
PO2 BLDA: 133 MM HG (ref 83–108)
PO2 BLDA: 69 MM HG (ref 83–108)
PO2 BLDA: 75 MM HG (ref 83–108)
POTASSIUM SERPL-SCNC: 3.1 MMOL/L (ref 3.4–5.1)
POTASSIUM SERPL-SCNC: 4.8 MMOL/L (ref 3.4–5.1)
PROT SERPL-MCNC: 6.7 G/DL (ref 6.4–8.2)
PROT SERPL-MCNC: 6.8 G/DL (ref 6.4–8.2)
RBC # BLD: 3.47 MIL/MCL (ref 4–5.2)
SAO2 % BLDA: 94 % (ref 95–99)
SAO2 % BLDA: 97 % (ref 95–99)
SAO2 % BLDA: 99 % (ref 95–99)
SODIUM SERPL-SCNC: 134 MMOL/L (ref 135–145)
SODIUM SERPL-SCNC: 135 MMOL/L (ref 135–145)
WBC # BLD: 13.5 K/MCL (ref 4.2–11)

## 2020-11-21 PROCEDURE — 10002800 HB RX 250 W HCPCS: Performed by: INTERNAL MEDICINE

## 2020-11-21 PROCEDURE — 84100 ASSAY OF PHOSPHORUS: CPT

## 2020-11-21 PROCEDURE — 3E0G76Z INTRODUCTION OF NUTRITIONAL SUBSTANCE INTO UPPER GI, VIA NATURAL OR ARTIFICIAL OPENING: ICD-10-PCS | Performed by: INTERNAL MEDICINE

## 2020-11-21 PROCEDURE — 10004651 HB RX, NO CHARGE ITEM: Performed by: INTERNAL MEDICINE

## 2020-11-21 PROCEDURE — 85025 COMPLETE CBC W/AUTO DIFF WBC: CPT

## 2020-11-21 PROCEDURE — 83605 ASSAY OF LACTIC ACID: CPT

## 2020-11-21 PROCEDURE — 82805 BLOOD GASES W/O2 SATURATION: CPT

## 2020-11-21 PROCEDURE — 85379 FIBRIN DEGRADATION QUANT: CPT

## 2020-11-21 PROCEDURE — 10004281 HB COUNTER-STAFF TIME PER 15 MIN

## 2020-11-21 PROCEDURE — 10002803 HB RX 637: Performed by: INTERNAL MEDICINE

## 2020-11-21 PROCEDURE — 82375 ASSAY CARBOXYHB QUANT: CPT

## 2020-11-21 PROCEDURE — 85018 HEMOGLOBIN: CPT

## 2020-11-21 PROCEDURE — 99291 CRITICAL CARE FIRST HOUR: CPT | Performed by: FAMILY MEDICINE

## 2020-11-21 PROCEDURE — 10002801 HB RX 250 W/O HCPCS: Performed by: INTERNAL MEDICINE

## 2020-11-21 PROCEDURE — 80053 COMPREHEN METABOLIC PANEL: CPT

## 2020-11-21 PROCEDURE — P9047 ALBUMIN (HUMAN), 25%, 50ML: HCPCS | Performed by: INTERNAL MEDICINE

## 2020-11-21 PROCEDURE — 83735 ASSAY OF MAGNESIUM: CPT

## 2020-11-21 PROCEDURE — 83050 HGB METHEMOGLOBIN QUAN: CPT

## 2020-11-21 PROCEDURE — 13003290 HB INO PER HOUR

## 2020-11-21 PROCEDURE — 10002807 HB RX 258: Performed by: INTERNAL MEDICINE

## 2020-11-21 PROCEDURE — 94003 VENT MGMT INPAT SUBQ DAY: CPT

## 2020-11-21 PROCEDURE — 71045 X-RAY EXAM CHEST 1 VIEW: CPT

## 2020-11-21 PROCEDURE — 10003585 HB ROOM CHARGE INTERMEDIATE CARE

## 2020-11-21 PROCEDURE — 85730 THROMBOPLASTIN TIME PARTIAL: CPT

## 2020-11-21 RX ORDER — NICOTINE POLACRILEX 4 MG
15 LOZENGE BUCCAL PRN
Status: DISCONTINUED | OUTPATIENT
Start: 2020-11-21 | End: 2020-12-02 | Stop reason: HOSPADM

## 2020-11-21 RX ORDER — NICOTINE POLACRILEX 4 MG
30 LOZENGE BUCCAL PRN
Status: DISCONTINUED | OUTPATIENT
Start: 2020-11-21 | End: 2020-12-02 | Stop reason: HOSPADM

## 2020-11-21 RX ORDER — DEXTROSE MONOHYDRATE 25 G/50ML
12.5 INJECTION, SOLUTION INTRAVENOUS PRN
Status: DISCONTINUED | OUTPATIENT
Start: 2020-11-21 | End: 2020-12-02 | Stop reason: HOSPADM

## 2020-11-21 RX ORDER — DEXTROSE MONOHYDRATE 50 MG/ML
INJECTION, SOLUTION INTRAVENOUS CONTINUOUS PRN
Status: DISCONTINUED | OUTPATIENT
Start: 2020-11-21 | End: 2020-12-02

## 2020-11-21 RX ORDER — HUMAN INSULIN 100 [IU]/ML
INJECTION, SOLUTION SUBCUTANEOUS EVERY 6 HOURS SCHEDULED
Status: DISCONTINUED | OUTPATIENT
Start: 2020-11-21 | End: 2020-12-02 | Stop reason: HOSPADM

## 2020-11-21 RX ORDER — POTASSIUM CHLORIDE 14.9 MG/ML
20 INJECTION INTRAVENOUS ONCE
Status: COMPLETED | OUTPATIENT
Start: 2020-11-21 | End: 2020-11-22

## 2020-11-21 RX ORDER — ACETAZOLAMIDE 500 MG/5ML
250 INJECTION, POWDER, LYOPHILIZED, FOR SOLUTION INTRAVENOUS 2 TIMES DAILY
Status: DISCONTINUED | OUTPATIENT
Start: 2020-11-21 | End: 2020-11-22

## 2020-11-21 RX ORDER — DEXTROSE MONOHYDRATE 25 G/50ML
25 INJECTION, SOLUTION INTRAVENOUS PRN
Status: DISCONTINUED | OUTPATIENT
Start: 2020-11-21 | End: 2020-12-02 | Stop reason: HOSPADM

## 2020-11-21 RX ORDER — POTASSIUM CHLORIDE 14.9 MG/ML
20 INJECTION INTRAVENOUS ONCE
Status: COMPLETED | OUTPATIENT
Start: 2020-11-22 | End: 2020-11-22

## 2020-11-21 RX ORDER — ALBUMIN (HUMAN) 12.5 G/50ML
25 SOLUTION INTRAVENOUS EVERY 6 HOURS SCHEDULED
Status: DISCONTINUED | OUTPATIENT
Start: 2020-11-21 | End: 2020-11-22 | Stop reason: SDUPTHER

## 2020-11-21 RX ORDER — FUROSEMIDE 10 MG/ML
40 INJECTION INTRAMUSCULAR; INTRAVENOUS ONCE
Status: COMPLETED | OUTPATIENT
Start: 2020-11-21 | End: 2020-11-21

## 2020-11-21 RX ADMIN — INSULIN HUMAN 8 UNITS: 100 INJECTION, SOLUTION PARENTERAL at 21:40

## 2020-11-21 RX ADMIN — ACETAZOLAMIDE 250 MG: 500 INJECTION, POWDER, LYOPHILIZED, FOR SOLUTION INTRAVENOUS at 23:18

## 2020-11-21 RX ADMIN — PROPOFOL 40 MCG/KG/MIN: 10 INJECTION, EMULSION INTRAVENOUS at 10:22

## 2020-11-21 RX ADMIN — HYDROCORTISONE SODIUM SUCCINATE 100 MG: 100 INJECTION, POWDER, FOR SOLUTION INTRAMUSCULAR; INTRAVENOUS at 13:58

## 2020-11-21 RX ADMIN — MINERAL OIL, PETROLATUM 1 APPLICATION.: 425; 573 OINTMENT OPHTHALMIC at 05:27

## 2020-11-21 RX ADMIN — Medication 100 MCG/HR: at 12:31

## 2020-11-21 RX ADMIN — HEPARIN SODIUM 12 UNITS/KG/HR: 10000 INJECTION, SOLUTION INTRAVENOUS at 20:30

## 2020-11-21 RX ADMIN — HYDROCORTISONE SODIUM SUCCINATE 100 MG: 100 INJECTION, POWDER, FOR SOLUTION INTRAMUSCULAR; INTRAVENOUS at 20:29

## 2020-11-21 RX ADMIN — MINERAL OIL, PETROLATUM 1 APPLICATION.: 425; 573 OINTMENT OPHTHALMIC at 17:20

## 2020-11-21 RX ADMIN — SODIUM CHLORIDE, PRESERVATIVE FREE 2 ML: 5 INJECTION INTRAVENOUS at 20:30

## 2020-11-21 RX ADMIN — PROPOFOL 40 MCG/KG/MIN: 10 INJECTION, EMULSION INTRAVENOUS at 16:28

## 2020-11-21 RX ADMIN — SODIUM CHLORIDE, PRESERVATIVE FREE 2 ML: 5 INJECTION INTRAVENOUS at 08:06

## 2020-11-21 RX ADMIN — Medication 13 MCG/MIN: at 06:10

## 2020-11-21 RX ADMIN — HYDROCORTISONE SODIUM SUCCINATE 100 MG: 100 INJECTION, POWDER, FOR SOLUTION INTRAMUSCULAR; INTRAVENOUS at 05:00

## 2020-11-21 RX ADMIN — PROPOFOL 30 MCG/KG/MIN: 10 INJECTION, EMULSION INTRAVENOUS at 04:50

## 2020-11-21 RX ADMIN — INSULIN LISPRO 8 UNITS: 100 INJECTION, SOLUTION INTRAVENOUS; SUBCUTANEOUS at 12:22

## 2020-11-21 RX ADMIN — CEFEPIME 2000 MG: 2 INJECTION, POWDER, FOR SOLUTION INTRAVENOUS at 14:00

## 2020-11-21 RX ADMIN — VASOPRESSIN 0.04 UNITS/MIN: 20 INJECTION INTRAVENOUS at 06:08

## 2020-11-21 RX ADMIN — Medication 10 MCG/MIN: at 17:13

## 2020-11-21 RX ADMIN — ROCURONIUM BROMIDE 50 MG: 10 INJECTION INTRAVENOUS at 00:35

## 2020-11-21 RX ADMIN — CEFEPIME 2000 MG: 2 INJECTION, POWDER, FOR SOLUTION INTRAVENOUS at 20:32

## 2020-11-21 RX ADMIN — MINERAL OIL, PETROLATUM 1 APPLICATION.: 425; 573 OINTMENT OPHTHALMIC at 00:12

## 2020-11-21 RX ADMIN — FUROSEMIDE 40 MG: 10 INJECTION, SOLUTION INTRAMUSCULAR; INTRAVENOUS at 06:39

## 2020-11-21 RX ADMIN — VASOPRESSIN 0.04 UNITS/MIN: 20 INJECTION INTRAVENOUS at 04:55

## 2020-11-21 RX ADMIN — CHLORHEXIDINE GLUCONATE 15 ML: 1.2 RINSE ORAL at 08:07

## 2020-11-21 RX ADMIN — CHLORHEXIDINE GLUCONATE 15 ML: 1.2 RINSE ORAL at 20:29

## 2020-11-21 RX ADMIN — FUROSEMIDE 10 MG/HR: 10 INJECTION, SOLUTION INTRAMUSCULAR; INTRAVENOUS at 11:05

## 2020-11-21 RX ADMIN — CEFEPIME 2000 MG: 2 INJECTION, POWDER, FOR SOLUTION INTRAVENOUS at 05:00

## 2020-11-21 RX ADMIN — PROPOFOL 40 MCG/KG/MIN: 10 INJECTION, EMULSION INTRAVENOUS at 23:31

## 2020-11-21 RX ADMIN — INSULIN LISPRO 4 UNITS: 100 INJECTION, SOLUTION INTRAVENOUS; SUBCUTANEOUS at 08:07

## 2020-11-21 RX ADMIN — INSULIN HUMAN 8 UNITS: 100 INJECTION, SOLUTION PARENTERAL at 23:15

## 2020-11-21 RX ADMIN — PANTOPRAZOLE 40 MG: 40 TABLET, DELAYED RELEASE ORAL at 08:07

## 2020-11-21 RX ADMIN — MINERAL OIL, PETROLATUM 1 APPLICATION.: 425; 573 OINTMENT OPHTHALMIC at 08:05

## 2020-11-21 RX ADMIN — INSULIN GLARGINE 26 UNITS: 100 INJECTION, SOLUTION SUBCUTANEOUS at 21:40

## 2020-11-21 RX ADMIN — MINERAL OIL, PETROLATUM 1 APPLICATION.: 425; 573 OINTMENT OPHTHALMIC at 20:29

## 2020-11-21 RX ADMIN — ALBUMIN (HUMAN) 25 G: 0.25 INJECTION, SOLUTION INTRAVENOUS at 23:16

## 2020-11-21 RX ADMIN — POTASSIUM CHLORIDE 20 MEQ: 14.9 INJECTION, SOLUTION INTRAVENOUS at 21:56

## 2020-11-21 RX ADMIN — MINERAL OIL, PETROLATUM 1 APPLICATION.: 425; 573 OINTMENT OPHTHALMIC at 23:15

## 2020-11-21 RX ADMIN — MINERAL OIL, PETROLATUM 1 APPLICATION.: 425; 573 OINTMENT OPHTHALMIC at 12:23

## 2020-11-21 RX ADMIN — PROPOFOL 40 MCG/KG/MIN: 10 INJECTION, EMULSION INTRAVENOUS at 20:32

## 2020-11-21 ASSESSMENT — PAIN SCALES - BEHAVIORAL PAIN SCALE (BPS)
BPS_SCORE: 3
BPS_SCORE: 4
BPS_SCORE: 3
BPS_SCORE: 3

## 2020-11-22 LAB
ALBUMIN SERPL-MCNC: 2.6 G/DL (ref 3.6–5.1)
ALBUMIN SERPL-MCNC: 2.8 G/DL (ref 3.6–5.1)
ALBUMIN/GLOB SERPL: 0.6 {RATIO} (ref 1–2.4)
ALBUMIN/GLOB SERPL: 0.7 {RATIO} (ref 1–2.4)
ALP SERPL-CCNC: 172 UNITS/L (ref 45–117)
ALP SERPL-CCNC: 195 UNITS/L (ref 45–117)
ALT SERPL-CCNC: 299 UNITS/L
ALT SERPL-CCNC: 301 UNITS/L
ANION GAP SERPL CALC-SCNC: 7 MMOL/L (ref 10–20)
ANION GAP SERPL CALC-SCNC: 8 MMOL/L (ref 10–20)
APTT PPP: 38 SEC (ref 22–30)
APTT PPP: 47 SEC (ref 22–30)
APTT PPP: 47 SEC (ref 22–30)
AST SERPL-CCNC: 422 UNITS/L
AST SERPL-CCNC: 518 UNITS/L
BASE EXCESS BLDA CALC-SCNC: 14 MMOL/L (ref 0–3)
BASOPHILS # BLD: 0 K/MCL (ref 0–0.3)
BASOPHILS NFR BLD: 0 %
BDY SITE: ABNORMAL
BILIRUB SERPL-MCNC: 0.8 MG/DL (ref 0.2–1)
BILIRUB SERPL-MCNC: 1.1 MG/DL (ref 0.2–1)
BODY TEMPERATURE: 36.4 DEGREES
BUN SERPL-MCNC: 26 MG/DL (ref 6–20)
BUN SERPL-MCNC: 29 MG/DL (ref 6–20)
BUN/CREAT SERPL: 28 (ref 7–25)
BUN/CREAT SERPL: 33 (ref 7–25)
CA-I BLDA-SCNC: 12 % (ref 15–23)
CALCIUM SERPL-MCNC: 8.1 MG/DL (ref 8.4–10.2)
CALCIUM SERPL-MCNC: 8.4 MG/DL (ref 8.4–10.2)
CHLORIDE SERPL-SCNC: 94 MMOL/L (ref 98–107)
CHLORIDE SERPL-SCNC: 96 MMOL/L (ref 98–107)
CO2 SERPL-SCNC: 36 MMOL/L (ref 21–32)
CO2 SERPL-SCNC: 37 MMOL/L (ref 21–32)
COHGB MFR BLD: 1.6 %
CONDITION-RC: ABNORMAL
CREAT SERPL-MCNC: 0.89 MG/DL (ref 0.51–0.95)
CREAT SERPL-MCNC: 0.94 MG/DL (ref 0.51–0.95)
DIFFERENTIAL METHOD BLD: ABNORMAL
EOSINOPHIL # BLD: 0 K/MCL (ref 0.1–0.5)
EOSINOPHIL NFR BLD: 0 %
ERYTHROCYTE [DISTWIDTH] IN BLOOD: 13.7 % (ref 11–15)
GLOBULIN SER-MCNC: 4 G/DL (ref 2–4)
GLOBULIN SER-MCNC: 4.2 G/DL (ref 2–4)
GLUCOSE BLDC GLUCOMTR-MCNC: 287 MG/DL (ref 70–99)
GLUCOSE BLDC GLUCOMTR-MCNC: 348 MG/DL (ref 70–99)
GLUCOSE SERPL-MCNC: 217 MG/DL (ref 65–99)
GLUCOSE SERPL-MCNC: 319 MG/DL (ref 65–99)
HCO3 BLDA-SCNC: 40 MMOL/L (ref 22–28)
HCT VFR BLD CALC: 27 % (ref 36–46.5)
HCT VFR BLD CALC: 28 % (ref 36–46.5)
HGB BLD-MCNC: 8.9 G/DL (ref 12–15.5)
HGB BLD-MCNC: 9.3 G/DL (ref 12–15.5)
HOROWITZ INDEX BLD+IHG-RTO: 60 %
HOROWITZ INDEX BLDA+IHG-RTO: 190 MM[HG] (ref 300–500)
IMM GRANULOCYTES # BLD AUTO: 0 K/MCL (ref 0–0.2)
IMM GRANULOCYTES NFR BLD: 1 %
LYMPHOCYTES # BLD: 0.4 K/MCL (ref 1–4)
LYMPHOCYTES NFR BLD: 5 %
MAGNESIUM SERPL-MCNC: 2 MG/DL (ref 1.7–2.4)
MAGNESIUM SERPL-MCNC: 2.3 MG/DL (ref 1.7–2.4)
MCH RBC QN AUTO: 28.7 PG (ref 26–34)
MCHC RBC AUTO-ENTMCNC: 33.2 G/DL (ref 32–36.5)
MCV RBC AUTO: 86.4 FL (ref 78–100)
METHGB MFR BLD: 0.4 %
MONOCYTES # BLD: 0.3 K/MCL (ref 0.3–0.9)
MONOCYTES NFR BLD: 4 %
NEUTROPHILS # BLD: 7.6 K/MCL (ref 1.8–7.7)
NEUTROPHILS NFR BLD: 90 %
NRBC BLD MANUAL-RTO: 0 /100 WBC
OXYHGB MFR BLD: 97.4 % (ref 94–98)
PCO2 BLDA: 57 MM HG (ref 32–45)
PH BLDA: 7.45 UNITS (ref 7.35–7.45)
PHOSPHATE SERPL-MCNC: 2.6 MG/DL (ref 2.4–4.7)
PHOSPHATE SERPL-MCNC: 2.7 MG/DL (ref 2.4–4.7)
PLATELET # BLD: 177 K/MCL (ref 140–450)
PO2 BLDA: 110 MM HG (ref 83–108)
POTASSIUM SERPL-SCNC: 2.8 MMOL/L (ref 3.4–5.1)
POTASSIUM SERPL-SCNC: 3.1 MMOL/L (ref 3.4–5.1)
POTASSIUM SERPL-SCNC: 3.2 MMOL/L (ref 3.4–5.1)
PROT SERPL-MCNC: 6.8 G/DL (ref 6.4–8.2)
PROT SERPL-MCNC: 6.8 G/DL (ref 6.4–8.2)
RBC # BLD: 3.24 MIL/MCL (ref 4–5.2)
SAO2 % BLDA: 99 % (ref 95–99)
SODIUM SERPL-SCNC: 135 MMOL/L (ref 135–145)
SODIUM SERPL-SCNC: 137 MMOL/L (ref 135–145)
WBC # BLD: 8.4 K/MCL (ref 4.2–11)

## 2020-11-22 PROCEDURE — 83735 ASSAY OF MAGNESIUM: CPT

## 2020-11-22 PROCEDURE — 83520 IMMUNOASSAY QUANT NOS NONAB: CPT

## 2020-11-22 PROCEDURE — 85025 COMPLETE CBC W/AUTO DIFF WBC: CPT

## 2020-11-22 PROCEDURE — 94003 VENT MGMT INPAT SUBQ DAY: CPT

## 2020-11-22 PROCEDURE — 10002800 HB RX 250 W HCPCS

## 2020-11-22 PROCEDURE — 85730 THROMBOPLASTIN TIME PARTIAL: CPT

## 2020-11-22 PROCEDURE — 10002803 HB RX 637: Performed by: INTERNAL MEDICINE

## 2020-11-22 PROCEDURE — 10002807 HB RX 258: Performed by: INTERNAL MEDICINE

## 2020-11-22 PROCEDURE — 80053 COMPREHEN METABOLIC PANEL: CPT

## 2020-11-22 PROCEDURE — P9047 ALBUMIN (HUMAN), 25%, 50ML: HCPCS | Performed by: INTERNAL MEDICINE

## 2020-11-22 PROCEDURE — 10002803 HB RX 637: Performed by: FAMILY MEDICINE

## 2020-11-22 PROCEDURE — P9045 ALBUMIN (HUMAN), 5%, 250 ML: HCPCS

## 2020-11-22 PROCEDURE — 10002800 HB RX 250 W HCPCS: Performed by: INTERNAL MEDICINE

## 2020-11-22 PROCEDURE — U0003 INFECTIOUS AGENT DETECTION BY NUCLEIC ACID (DNA OR RNA); SEVERE ACUTE RESPIRATORY SYNDROME CORONAVIRUS 2 (SARS-COV-2) (CORONAVIRUS DISEASE [COVID-19]), AMPLIFIED PROBE TECHNIQUE, MAKING USE OF HIGH THROUGHPUT TECHNOLOGIES AS DESCRIBED BY CMS-2020-01-R: HCPCS

## 2020-11-22 PROCEDURE — 10004281 HB COUNTER-STAFF TIME PER 15 MIN

## 2020-11-22 PROCEDURE — 13003290 HB INO PER HOUR

## 2020-11-22 PROCEDURE — 10002800 HB RX 250 W HCPCS: Performed by: EMERGENCY MEDICINE

## 2020-11-22 PROCEDURE — 84100 ASSAY OF PHOSPHORUS: CPT

## 2020-11-22 PROCEDURE — 94770 HB END TIDAL CO2 DETERMINATION: CPT

## 2020-11-22 PROCEDURE — 84132 ASSAY OF SERUM POTASSIUM: CPT

## 2020-11-22 PROCEDURE — 83050 HGB METHEMOGLOBIN QUAN: CPT

## 2020-11-22 PROCEDURE — 10003585 HB ROOM CHARGE INTERMEDIATE CARE

## 2020-11-22 PROCEDURE — 99291 CRITICAL CARE FIRST HOUR: CPT | Performed by: FAMILY MEDICINE

## 2020-11-22 PROCEDURE — 10002801 HB RX 250 W/O HCPCS: Performed by: INTERNAL MEDICINE

## 2020-11-22 PROCEDURE — 10004651 HB RX, NO CHARGE ITEM: Performed by: INTERNAL MEDICINE

## 2020-11-22 PROCEDURE — 85018 HEMOGLOBIN: CPT

## 2020-11-22 RX ORDER — INSULIN GLARGINE 100 [IU]/ML
20 INJECTION, SOLUTION SUBCUTANEOUS DAILY
Status: DISCONTINUED | OUTPATIENT
Start: 2020-11-22 | End: 2020-11-22

## 2020-11-22 RX ORDER — FUROSEMIDE 10 MG/ML
20 INJECTION INTRAMUSCULAR; INTRAVENOUS 2 TIMES DAILY
Status: DISCONTINUED | OUTPATIENT
Start: 2020-11-23 | End: 2020-11-26

## 2020-11-22 RX ORDER — POTASSIUM CHLORIDE 1.5 G/1.58G
20 POWDER, FOR SOLUTION ORAL ONCE
Status: DISCONTINUED | OUTPATIENT
Start: 2020-11-22 | End: 2020-11-22 | Stop reason: SDUPTHER

## 2020-11-22 RX ORDER — POTASSIUM CHLORIDE 29.8 MG/ML
20 INJECTION INTRAVENOUS ONCE
Status: DISCONTINUED | OUTPATIENT
Start: 2020-11-22 | End: 2020-11-22 | Stop reason: SDUPTHER

## 2020-11-22 RX ORDER — INSULIN GLARGINE 100 [IU]/ML
20 INJECTION, SOLUTION SUBCUTANEOUS ONCE
Status: COMPLETED | OUTPATIENT
Start: 2020-11-22 | End: 2020-11-22

## 2020-11-22 RX ORDER — ALBUMIN (HUMAN) 12.5 G/50ML
25 SOLUTION INTRAVENOUS ONCE
Status: COMPLETED | OUTPATIENT
Start: 2020-11-22 | End: 2020-11-22

## 2020-11-22 RX ORDER — WATER 10 ML/10ML
INJECTION INTRAMUSCULAR; INTRAVENOUS; SUBCUTANEOUS
Status: DISPENSED
Start: 2020-11-22 | End: 2020-11-23

## 2020-11-22 RX ORDER — FUROSEMIDE 10 MG/ML
40 INJECTION INTRAMUSCULAR; INTRAVENOUS ONCE
Status: COMPLETED | OUTPATIENT
Start: 2020-11-22 | End: 2020-11-22

## 2020-11-22 RX ORDER — ACETAZOLAMIDE 500 MG/5ML
250 INJECTION, POWDER, LYOPHILIZED, FOR SOLUTION INTRAVENOUS EVERY 6 HOURS
Status: COMPLETED | OUTPATIENT
Start: 2020-11-22 | End: 2020-11-22

## 2020-11-22 RX ORDER — INSULIN GLARGINE 100 [IU]/ML
40 INJECTION, SOLUTION SUBCUTANEOUS NIGHTLY
Status: DISCONTINUED | OUTPATIENT
Start: 2020-11-22 | End: 2020-11-24

## 2020-11-22 RX ORDER — POTASSIUM CHLORIDE 1.5 G/1.58G
40 POWDER, FOR SOLUTION ORAL ONCE
Status: COMPLETED | OUTPATIENT
Start: 2020-11-22 | End: 2020-11-22

## 2020-11-22 RX ORDER — ALBUMIN, HUMAN INJ 5% 5 %
SOLUTION INTRAVENOUS
Status: COMPLETED
Start: 2020-11-22 | End: 2020-11-22

## 2020-11-22 RX ORDER — ACETAZOLAMIDE 500 MG/5ML
250 INJECTION, POWDER, LYOPHILIZED, FOR SOLUTION INTRAVENOUS EVERY 6 HOURS
Status: COMPLETED | OUTPATIENT
Start: 2020-11-22 | End: 2020-11-23

## 2020-11-22 RX ADMIN — MINERAL OIL, PETROLATUM 1 APPLICATION.: 425; 573 OINTMENT OPHTHALMIC at 04:17

## 2020-11-22 RX ADMIN — PROPOFOL 30 MCG/KG/MIN: 10 INJECTION, EMULSION INTRAVENOUS at 16:46

## 2020-11-22 RX ADMIN — INSULIN HUMAN 8 UNITS: 100 INJECTION, SOLUTION PARENTERAL at 18:03

## 2020-11-22 RX ADMIN — HYDROCORTISONE SODIUM SUCCINATE 100 MG: 100 INJECTION, POWDER, FOR SOLUTION INTRAMUSCULAR; INTRAVENOUS at 13:56

## 2020-11-22 RX ADMIN — FUROSEMIDE 10 MG/HR: 10 INJECTION, SOLUTION INTRAMUSCULAR; INTRAVENOUS at 06:26

## 2020-11-22 RX ADMIN — MINERAL OIL, PETROLATUM 1 APPLICATION.: 425; 573 OINTMENT OPHTHALMIC at 08:24

## 2020-11-22 RX ADMIN — POTASSIUM CHLORIDE 40 MEQ: 1.5 POWDER, FOR SOLUTION ORAL at 20:01

## 2020-11-22 RX ADMIN — CEFEPIME 2000 MG: 2 INJECTION, POWDER, FOR SOLUTION INTRAVENOUS at 06:01

## 2020-11-22 RX ADMIN — PROPOFOL 50 MCG/KG/MIN: 10 INJECTION, EMULSION INTRAVENOUS at 08:51

## 2020-11-22 RX ADMIN — MINERAL OIL, PETROLATUM 1 APPLICATION.: 425; 573 OINTMENT OPHTHALMIC at 20:01

## 2020-11-22 RX ADMIN — ROCURONIUM BROMIDE 50 MG: 10 INJECTION INTRAVENOUS at 01:55

## 2020-11-22 RX ADMIN — MINERAL OIL, PETROLATUM 1 APPLICATION.: 425; 573 OINTMENT OPHTHALMIC at 15:34

## 2020-11-22 RX ADMIN — CEFEPIME 2000 MG: 2 INJECTION, POWDER, FOR SOLUTION INTRAVENOUS at 13:56

## 2020-11-22 RX ADMIN — INSULIN HUMAN 8 UNITS: 100 INJECTION, SOLUTION PARENTERAL at 10:58

## 2020-11-22 RX ADMIN — ACETAZOLAMIDE 250 MG: 500 INJECTION, POWDER, LYOPHILIZED, FOR SOLUTION INTRAVENOUS at 22:29

## 2020-11-22 RX ADMIN — POTASSIUM CHLORIDE 80 MEQ: 2 INJECTION, SOLUTION, CONCENTRATE INTRAVENOUS at 06:01

## 2020-11-22 RX ADMIN — CHLORHEXIDINE GLUCONATE 15 ML: 1.2 RINSE ORAL at 20:01

## 2020-11-22 RX ADMIN — HYDROCORTISONE SODIUM SUCCINATE 100 MG: 100 INJECTION, POWDER, FOR SOLUTION INTRAMUSCULAR; INTRAVENOUS at 05:55

## 2020-11-22 RX ADMIN — ACETAZOLAMIDE 250 MG: 500 INJECTION, POWDER, LYOPHILIZED, FOR SOLUTION INTRAVENOUS at 05:55

## 2020-11-22 RX ADMIN — ALBUMIN (HUMAN) 25 G: 0.25 INJECTION, SOLUTION INTRAVENOUS at 06:23

## 2020-11-22 RX ADMIN — SODIUM CHLORIDE, PRESERVATIVE FREE 2 ML: 5 INJECTION INTRAVENOUS at 10:55

## 2020-11-22 RX ADMIN — FUROSEMIDE 40 MG: 10 INJECTION, SOLUTION INTRAMUSCULAR; INTRAVENOUS at 22:17

## 2020-11-22 RX ADMIN — CHLORHEXIDINE GLUCONATE 15 ML: 1.2 RINSE ORAL at 10:55

## 2020-11-22 RX ADMIN — ALBUMIN (HUMAN) 0.05 G: 12.5 SOLUTION INTRAVENOUS at 22:30

## 2020-11-22 RX ADMIN — POTASSIUM CHLORIDE 20 MEQ: 14.9 INJECTION, SOLUTION INTRAVENOUS at 00:29

## 2020-11-22 RX ADMIN — PANTOPRAZOLE 40 MG: 40 TABLET, DELAYED RELEASE ORAL at 10:54

## 2020-11-22 RX ADMIN — CEFEPIME 2000 MG: 2 INJECTION, POWDER, FOR SOLUTION INTRAVENOUS at 20:13

## 2020-11-22 RX ADMIN — INSULIN HUMAN 4 UNITS: 100 INJECTION, SOLUTION PARENTERAL at 18:03

## 2020-11-22 RX ADMIN — ALBUMIN (HUMAN) 25 G: 12.5 SOLUTION INTRAVENOUS at 22:16

## 2020-11-22 RX ADMIN — INSULIN GLARGINE 40 UNITS: 100 INJECTION, SOLUTION SUBCUTANEOUS at 20:01

## 2020-11-22 RX ADMIN — INSULIN GLARGINE 20 UNITS: 100 INJECTION, SOLUTION SUBCUTANEOUS at 05:55

## 2020-11-22 RX ADMIN — HEPARIN SODIUM 14 UNITS/KG/HR: 10000 INJECTION, SOLUTION INTRAVENOUS at 18:13

## 2020-11-22 RX ADMIN — INSULIN GLARGINE 20 UNITS: 100 INJECTION, SOLUTION SUBCUTANEOUS at 16:50

## 2020-11-22 RX ADMIN — PROPOFOL 50 MCG/KG/MIN: 10 INJECTION, EMULSION INTRAVENOUS at 04:16

## 2020-11-22 RX ADMIN — Medication 100 MCG/HR: at 13:59

## 2020-11-22 RX ADMIN — PROPOFOL 50 MCG/KG/MIN: 10 INJECTION, EMULSION INTRAVENOUS at 11:55

## 2020-11-22 RX ADMIN — INSULIN HUMAN 8 UNITS: 100 INJECTION, SOLUTION PARENTERAL at 05:56

## 2020-11-22 RX ADMIN — ACETAZOLAMIDE 250 MG: 500 INJECTION, POWDER, LYOPHILIZED, FOR SOLUTION INTRAVENOUS at 11:55

## 2020-11-22 RX ADMIN — MINERAL OIL, PETROLATUM 1 APPLICATION.: 425; 573 OINTMENT OPHTHALMIC at 11:00

## 2020-11-22 RX ADMIN — HYDROCORTISONE SODIUM SUCCINATE 100 MG: 100 INJECTION, POWDER, FOR SOLUTION INTRAMUSCULAR; INTRAVENOUS at 20:01

## 2020-11-22 RX ADMIN — PROPOFOL 25 MCG/KG/MIN: 10 INJECTION, EMULSION INTRAVENOUS at 22:34

## 2020-11-22 RX ADMIN — SODIUM CHLORIDE, PRESERVATIVE FREE 2 ML: 5 INJECTION INTRAVENOUS at 20:02

## 2020-11-22 ASSESSMENT — PATIENT HEALTH QUESTIONNAIRE - PHQ9: IS PATIENT ABLE TO COMPLETE PHQ2 OR PHQ9: NO, DEFER TO LATER TIME

## 2020-11-22 ASSESSMENT — PAIN SCALES - BEHAVIORAL PAIN SCALE (BPS)
BPS_SCORE: 3

## 2020-11-23 LAB
ALBUMIN SERPL-MCNC: 2.9 G/DL (ref 3.6–5.1)
ALBUMIN/GLOB SERPL: 0.8 {RATIO} (ref 1–2.4)
ALP SERPL-CCNC: 175 UNITS/L (ref 45–117)
ALT SERPL-CCNC: 263 UNITS/L
ANION GAP SERPL CALC-SCNC: 6 MMOL/L (ref 10–20)
APTT PPP: 52 SEC (ref 22–30)
AST SERPL-CCNC: 306 UNITS/L
BASOPHILS # BLD: 0 K/MCL (ref 0–0.3)
BASOPHILS NFR BLD: 0 %
BILIRUB SERPL-MCNC: 0.9 MG/DL (ref 0.2–1)
BUN SERPL-MCNC: 32 MG/DL (ref 6–20)
BUN/CREAT SERPL: 39 (ref 7–25)
CALCIUM SERPL-MCNC: 8.8 MG/DL (ref 8.4–10.2)
CHLORIDE SERPL-SCNC: 99 MMOL/L (ref 98–107)
CO2 SERPL-SCNC: 37 MMOL/L (ref 21–32)
CREAT SERPL-MCNC: 0.82 MG/DL (ref 0.51–0.95)
D DIMER PPP FEU-MCNC: 1.4 MG/L (FEU)
DIFFERENTIAL METHOD BLD: ABNORMAL
EOSINOPHIL # BLD: 0 K/MCL (ref 0.1–0.5)
EOSINOPHIL NFR BLD: 0 %
ERYTHROCYTE [DISTWIDTH] IN BLOOD: 14 % (ref 11–15)
GLOBULIN SER-MCNC: 3.7 G/DL (ref 2–4)
GLUCOSE BLDC GLUCOMTR-MCNC: 135 MG/DL (ref 70–99)
GLUCOSE BLDC GLUCOMTR-MCNC: 149 MG/DL (ref 70–99)
GLUCOSE BLDC GLUCOMTR-MCNC: 173 MG/DL (ref 70–99)
GLUCOSE SERPL-MCNC: 151 MG/DL (ref 65–99)
HCT VFR BLD CALC: 25.6 % (ref 36–46.5)
HGB BLD-MCNC: 8.2 G/DL (ref 12–15.5)
IMM GRANULOCYTES # BLD AUTO: 0 K/MCL (ref 0–0.2)
IMM GRANULOCYTES NFR BLD: 1 %
LYMPHOCYTES # BLD: 0.5 K/MCL (ref 1–4)
LYMPHOCYTES NFR BLD: 8 %
MAGNESIUM SERPL-MCNC: 2.2 MG/DL (ref 1.7–2.4)
MCH RBC QN AUTO: 28.9 PG (ref 26–34)
MCHC RBC AUTO-ENTMCNC: 32 G/DL (ref 32–36.5)
MCV RBC AUTO: 90.1 FL (ref 78–100)
MONOCYTES # BLD: 0.4 K/MCL (ref 0.3–0.9)
MONOCYTES NFR BLD: 6 %
NEUTROPHILS # BLD: 5.2 K/MCL (ref 1.8–7.7)
NEUTROPHILS NFR BLD: 85 %
NRBC BLD MANUAL-RTO: 0 /100 WBC
PHOSPHATE SERPL-MCNC: 2.1 MG/DL (ref 2.4–4.7)
PHOSPHATE SERPL-MCNC: 4.2 MG/DL (ref 2.4–4.7)
PLATELET # BLD: 134 K/MCL (ref 140–450)
POTASSIUM SERPL-SCNC: 3 MMOL/L (ref 3.4–5.1)
POTASSIUM SERPL-SCNC: 3.1 MMOL/L (ref 3.4–5.1)
POTASSIUM SERPL-SCNC: 3.3 MMOL/L (ref 3.4–5.1)
PROT SERPL-MCNC: 6.6 G/DL (ref 6.4–8.2)
RBC # BLD: 2.84 MIL/MCL (ref 4–5.2)
SARS-COV-2 RNA RESP QL NAA+PROBE: DETECTED
SODIUM SERPL-SCNC: 139 MMOL/L (ref 135–145)
SPECIMEN SOURCE: ABNORMAL
TRIGL SERPL-MCNC: 211 MG/DL
WBC # BLD: 6.1 K/MCL (ref 4.2–11)

## 2020-11-23 PROCEDURE — 13003290 HB INO PER HOUR

## 2020-11-23 PROCEDURE — 10002807 HB RX 258: Performed by: INTERNAL MEDICINE

## 2020-11-23 PROCEDURE — 85025 COMPLETE CBC W/AUTO DIFF WBC: CPT

## 2020-11-23 PROCEDURE — 80053 COMPREHEN METABOLIC PANEL: CPT

## 2020-11-23 PROCEDURE — 10002800 HB RX 250 W HCPCS: Performed by: INTERNAL MEDICINE

## 2020-11-23 PROCEDURE — 10003585 HB ROOM CHARGE INTERMEDIATE CARE

## 2020-11-23 PROCEDURE — 85730 THROMBOPLASTIN TIME PARTIAL: CPT

## 2020-11-23 PROCEDURE — 10002803 HB RX 637: Performed by: INTERNAL MEDICINE

## 2020-11-23 PROCEDURE — 85379 FIBRIN DEGRADATION QUANT: CPT

## 2020-11-23 PROCEDURE — 84100 ASSAY OF PHOSPHORUS: CPT

## 2020-11-23 PROCEDURE — 99291 CRITICAL CARE FIRST HOUR: CPT | Performed by: FAMILY MEDICINE

## 2020-11-23 PROCEDURE — 84132 ASSAY OF SERUM POTASSIUM: CPT

## 2020-11-23 PROCEDURE — 10004281 HB COUNTER-STAFF TIME PER 15 MIN

## 2020-11-23 PROCEDURE — 94003 VENT MGMT INPAT SUBQ DAY: CPT

## 2020-11-23 PROCEDURE — 10002801 HB RX 250 W/O HCPCS: Performed by: INTERNAL MEDICINE

## 2020-11-23 PROCEDURE — 83735 ASSAY OF MAGNESIUM: CPT

## 2020-11-23 PROCEDURE — 84478 ASSAY OF TRIGLYCERIDES: CPT

## 2020-11-23 PROCEDURE — 10004651 HB RX, NO CHARGE ITEM: Performed by: INTERNAL MEDICINE

## 2020-11-23 RX ORDER — POTASSIUM CHLORIDE 1.5 G/1.58G
40 POWDER, FOR SOLUTION ORAL ONCE
Status: DISCONTINUED | OUTPATIENT
Start: 2020-11-23 | End: 2020-11-23

## 2020-11-23 RX ORDER — POTASSIUM CHLORIDE 1.5 G/1.58G
40 POWDER, FOR SOLUTION ORAL ONCE
Status: COMPLETED | OUTPATIENT
Start: 2020-11-23 | End: 2020-11-23

## 2020-11-23 RX ADMIN — HYDROCORTISONE SODIUM SUCCINATE 100 MG: 100 INJECTION, POWDER, FOR SOLUTION INTRAMUSCULAR; INTRAVENOUS at 22:00

## 2020-11-23 RX ADMIN — MINERAL OIL, PETROLATUM 1 APPLICATION.: 425; 573 OINTMENT OPHTHALMIC at 12:30

## 2020-11-23 RX ADMIN — MINERAL OIL, PETROLATUM 1 APPLICATION.: 425; 573 OINTMENT OPHTHALMIC at 03:43

## 2020-11-23 RX ADMIN — CEFEPIME 2000 MG: 2 INJECTION, POWDER, FOR SOLUTION INTRAVENOUS at 05:07

## 2020-11-23 RX ADMIN — SODIUM CHLORIDE, PRESERVATIVE FREE 2 ML: 5 INJECTION INTRAVENOUS at 20:17

## 2020-11-23 RX ADMIN — MINERAL OIL, PETROLATUM 1 APPLICATION.: 425; 573 OINTMENT OPHTHALMIC at 00:16

## 2020-11-23 RX ADMIN — INSULIN HUMAN 8 UNITS: 100 INJECTION, SOLUTION PARENTERAL at 18:41

## 2020-11-23 RX ADMIN — POTASSIUM CHLORIDE 40 MEQ: 1.5 POWDER, FOR SOLUTION ORAL at 01:42

## 2020-11-23 RX ADMIN — PROPOFOL 35 MCG/KG/MIN: 10 INJECTION, EMULSION INTRAVENOUS at 16:35

## 2020-11-23 RX ADMIN — SODIUM CHLORIDE, PRESERVATIVE FREE 2 ML: 5 INJECTION INTRAVENOUS at 08:08

## 2020-11-23 RX ADMIN — INSULIN HUMAN 2 UNITS: 100 INJECTION, SOLUTION PARENTERAL at 06:13

## 2020-11-23 RX ADMIN — MINERAL OIL, PETROLATUM 1 APPLICATION.: 425; 573 OINTMENT OPHTHALMIC at 15:47

## 2020-11-23 RX ADMIN — Medication 100 MCG/HR: at 15:37

## 2020-11-23 RX ADMIN — CHLORHEXIDINE GLUCONATE 15 ML: 1.2 RINSE ORAL at 20:16

## 2020-11-23 RX ADMIN — MINERAL OIL, PETROLATUM 1 APPLICATION.: 425; 573 OINTMENT OPHTHALMIC at 08:13

## 2020-11-23 RX ADMIN — FUROSEMIDE 20 MG: 10 INJECTION, SOLUTION INTRAMUSCULAR; INTRAVENOUS at 20:18

## 2020-11-23 RX ADMIN — MINERAL OIL, PETROLATUM 1 APPLICATION.: 425; 573 OINTMENT OPHTHALMIC at 20:16

## 2020-11-23 RX ADMIN — INSULIN HUMAN 2 UNITS: 100 INJECTION, SOLUTION PARENTERAL at 12:35

## 2020-11-23 RX ADMIN — PROPOFOL 35 MCG/KG/MIN: 10 INJECTION, EMULSION INTRAVENOUS at 20:48

## 2020-11-23 RX ADMIN — CEFEPIME 2000 MG: 2 INJECTION, POWDER, FOR SOLUTION INTRAVENOUS at 20:47

## 2020-11-23 RX ADMIN — PROPOFOL 25 MCG/KG/MIN: 10 INJECTION, EMULSION INTRAVENOUS at 03:43

## 2020-11-23 RX ADMIN — HYDROCORTISONE SODIUM SUCCINATE 100 MG: 100 INJECTION, POWDER, FOR SOLUTION INTRAMUSCULAR; INTRAVENOUS at 05:07

## 2020-11-23 RX ADMIN — FUROSEMIDE 20 MG: 10 INJECTION, SOLUTION INTRAMUSCULAR; INTRAVENOUS at 08:08

## 2020-11-23 RX ADMIN — PANTOPRAZOLE 40 MG: 40 TABLET, DELAYED RELEASE ORAL at 08:09

## 2020-11-23 RX ADMIN — CEFEPIME 2000 MG: 2 INJECTION, POWDER, FOR SOLUTION INTRAVENOUS at 12:39

## 2020-11-23 RX ADMIN — HEPARIN SODIUM 14 UNITS/KG/HR: 10000 INJECTION, SOLUTION INTRAVENOUS at 15:44

## 2020-11-23 RX ADMIN — ACETAZOLAMIDE 250 MG: 500 INJECTION, POWDER, LYOPHILIZED, FOR SOLUTION INTRAVENOUS at 05:07

## 2020-11-23 RX ADMIN — CHLORHEXIDINE GLUCONATE 15 ML: 1.2 RINSE ORAL at 08:09

## 2020-11-23 RX ADMIN — POTASSIUM PHOSPHATE, MONOBASIC AND POTASSIUM PHOSPHATE, DIBASIC 45 MMOL: 224; 236 INJECTION, SOLUTION, CONCENTRATE INTRAVENOUS at 06:31

## 2020-11-23 RX ADMIN — INSULIN HUMAN 8 UNITS: 100 INJECTION, SOLUTION PARENTERAL at 12:35

## 2020-11-23 RX ADMIN — PROPOFOL 30 MCG/KG/MIN: 10 INJECTION, EMULSION INTRAVENOUS at 10:54

## 2020-11-23 RX ADMIN — HYDROCORTISONE SODIUM SUCCINATE 100 MG: 100 INJECTION, POWDER, FOR SOLUTION INTRAMUSCULAR; INTRAVENOUS at 12:37

## 2020-11-23 RX ADMIN — INSULIN GLARGINE 40 UNITS: 100 INJECTION, SOLUTION SUBCUTANEOUS at 20:46

## 2020-11-23 ASSESSMENT — PAIN SCALES - BEHAVIORAL PAIN SCALE (BPS)
BPS_SCORE: 6
BPS_SCORE: 3

## 2020-11-24 LAB
ALBUMIN SERPL-MCNC: 2.5 G/DL (ref 3.6–5.1)
ALBUMIN/GLOB SERPL: 0.7 {RATIO} (ref 1–2.4)
ALP SERPL-CCNC: 151 UNITS/L (ref 45–117)
ALT SERPL-CCNC: 187 UNITS/L
ANION GAP SERPL CALC-SCNC: 8 MMOL/L (ref 10–20)
APTT PPP: 51 SEC (ref 22–30)
AST SERPL-CCNC: 105 UNITS/L
BASE EXCESS BLDA CALC-SCNC: 11 MMOL/L (ref 0–3)
BASOPHILS # BLD: 0 K/MCL (ref 0–0.3)
BASOPHILS NFR BLD: 0 %
BDY SITE: ABNORMAL
BILIRUB SERPL-MCNC: 0.5 MG/DL (ref 0.2–1)
BODY TEMPERATURE: 36.3 DEGREES
BUN SERPL-MCNC: 37 MG/DL (ref 6–20)
BUN/CREAT SERPL: 52 (ref 7–25)
CA-I BLD ISE-SCNC: 1.13 MMOL/L (ref 1.15–1.29)
CA-I BLDA-SCNC: 13 % (ref 15–23)
CALCIUM SERPL-MCNC: 8 MG/DL (ref 8.4–10.2)
CHLORIDE BLD-SCNC: 102 MMOL/L (ref 98–107)
CHLORIDE SERPL-SCNC: 101 MMOL/L (ref 98–107)
CO2 SERPL-SCNC: 35 MMOL/L (ref 21–32)
COHGB MFR BLD: 1.7 %
CREAT SERPL-MCNC: 0.71 MG/DL (ref 0.51–0.95)
DIFFERENTIAL METHOD BLD: ABNORMAL
EOSINOPHIL # BLD: 0 K/MCL (ref 0.1–0.5)
EOSINOPHIL NFR BLD: 0 %
ERYTHROCYTE [DISTWIDTH] IN BLOOD: 14.1 % (ref 11–15)
GLOBULIN SER-MCNC: 3.8 G/DL (ref 2–4)
GLUCOSE BLD-MCNC: 117 MG/DL (ref 65–99)
GLUCOSE BLDC GLUCOMTR-MCNC: 108 MG/DL (ref 70–99)
GLUCOSE BLDC GLUCOMTR-MCNC: 109 MG/DL (ref 70–99)
GLUCOSE BLDC GLUCOMTR-MCNC: 187 MG/DL (ref 70–99)
GLUCOSE BLDC GLUCOMTR-MCNC: 89 MG/DL (ref 70–99)
GLUCOSE SERPL-MCNC: 119 MG/DL (ref 65–99)
HCO3 BLDA-SCNC: 37 MMOL/L (ref 22–28)
HCT VFR BLD CALC: 27 % (ref 36–46.5)
HCT VFR BLD CALC: 31 % (ref 36–46.5)
HGB BLD-MCNC: 10.2 G/DL (ref 12–15.5)
HGB BLD-MCNC: 8.7 G/DL (ref 12–15.5)
HOROWITZ INDEX BLD+IHG-RTO: 50 %
HOROWITZ INDEX BLDA+IHG-RTO: 120 MM[HG] (ref 300–500)
IMM GRANULOCYTES # BLD AUTO: 0.1 K/MCL (ref 0–0.2)
IMM GRANULOCYTES NFR BLD: 2 %
LACTATE BLDA-MCNC: 0.7 MMOL/L
LYMPHOCYTES # BLD: 0.6 K/MCL (ref 1–4)
LYMPHOCYTES NFR BLD: 10 %
MAGNESIUM SERPL-MCNC: 2.4 MG/DL (ref 1.7–2.4)
MCH RBC QN AUTO: 29.2 PG (ref 26–34)
MCHC RBC AUTO-ENTMCNC: 32.2 G/DL (ref 32–36.5)
MCV RBC AUTO: 90.6 FL (ref 78–100)
METHGB MFR BLD: 0.9 %
MONOCYTES # BLD: 0.4 K/MCL (ref 0.3–0.9)
MONOCYTES NFR BLD: 6 %
NEUTROPHILS # BLD: 5.1 K/MCL (ref 1.8–7.7)
NEUTROPHILS NFR BLD: 82 %
NRBC BLD MANUAL-RTO: 0 /100 WBC
OXYHGB MFR BLD: 88.7 % (ref 94–98)
PCO2 BLDA: 52 MM HG (ref 32–45)
PH BLDA: 7.45 UNITS (ref 7.35–7.45)
PHOSPHATE SERPL-MCNC: 3.8 MG/DL (ref 2.4–4.7)
PLATELET # BLD: 160 K/MCL (ref 140–450)
PO2 BLDA: 57 MM HG (ref 83–108)
POTASSIUM BLD-SCNC: 3.9 MMOL/L (ref 3.4–5.1)
POTASSIUM SERPL-SCNC: 3 MMOL/L (ref 3.4–5.1)
POTASSIUM SERPL-SCNC: 3.7 MMOL/L (ref 3.4–5.1)
PROT SERPL-MCNC: 6.3 G/DL (ref 6.4–8.2)
RBC # BLD: 2.98 MIL/MCL (ref 4–5.2)
SAO2 % BLDA: 91 % (ref 95–99)
SODIUM BLD-SCNC: 140 MMOL/L (ref 135–145)
SODIUM SERPL-SCNC: 141 MMOL/L (ref 135–145)
WBC # BLD: 6.2 K/MCL (ref 4.2–11)

## 2020-11-24 PROCEDURE — 10004281 HB COUNTER-STAFF TIME PER 15 MIN

## 2020-11-24 PROCEDURE — 82805 BLOOD GASES W/O2 SATURATION: CPT

## 2020-11-24 PROCEDURE — 10002800 HB RX 250 W HCPCS: Performed by: INTERNAL MEDICINE

## 2020-11-24 PROCEDURE — 10004651 HB RX, NO CHARGE ITEM: Performed by: INTERNAL MEDICINE

## 2020-11-24 PROCEDURE — 10002803 HB RX 637: Performed by: INTERNAL MEDICINE

## 2020-11-24 PROCEDURE — 13003290 HB INO PER HOUR

## 2020-11-24 PROCEDURE — 82330 ASSAY OF CALCIUM: CPT

## 2020-11-24 PROCEDURE — 85730 THROMBOPLASTIN TIME PARTIAL: CPT

## 2020-11-24 PROCEDURE — 83735 ASSAY OF MAGNESIUM: CPT

## 2020-11-24 PROCEDURE — 84100 ASSAY OF PHOSPHORUS: CPT

## 2020-11-24 PROCEDURE — 99233 SBSQ HOSP IP/OBS HIGH 50: CPT | Performed by: INTERNAL MEDICINE

## 2020-11-24 PROCEDURE — 83605 ASSAY OF LACTIC ACID: CPT

## 2020-11-24 PROCEDURE — 80053 COMPREHEN METABOLIC PANEL: CPT

## 2020-11-24 PROCEDURE — 94003 VENT MGMT INPAT SUBQ DAY: CPT

## 2020-11-24 PROCEDURE — 10002807 HB RX 258: Performed by: INTERNAL MEDICINE

## 2020-11-24 PROCEDURE — 84132 ASSAY OF SERUM POTASSIUM: CPT

## 2020-11-24 PROCEDURE — 82947 ASSAY GLUCOSE BLOOD QUANT: CPT

## 2020-11-24 PROCEDURE — 85018 HEMOGLOBIN: CPT

## 2020-11-24 PROCEDURE — 85025 COMPLETE CBC W/AUTO DIFF WBC: CPT

## 2020-11-24 PROCEDURE — 10003585 HB ROOM CHARGE INTERMEDIATE CARE

## 2020-11-24 PROCEDURE — 10002801 HB RX 250 W/O HCPCS: Performed by: INTERNAL MEDICINE

## 2020-11-24 RX ORDER — POTASSIUM CHLORIDE 1.5 G/1.58G
40 POWDER, FOR SOLUTION ORAL ONCE
Status: COMPLETED | OUTPATIENT
Start: 2020-11-24 | End: 2020-11-24

## 2020-11-24 RX ORDER — INSULIN GLARGINE 100 [IU]/ML
25 INJECTION, SOLUTION SUBCUTANEOUS NIGHTLY
Status: DISCONTINUED | OUTPATIENT
Start: 2020-11-24 | End: 2020-11-26

## 2020-11-24 RX ADMIN — ROCURONIUM BROMIDE 50 MG: 10 INJECTION INTRAVENOUS at 20:39

## 2020-11-24 RX ADMIN — MINERAL OIL, PETROLATUM 1 APPLICATION.: 425; 573 OINTMENT OPHTHALMIC at 20:32

## 2020-11-24 RX ADMIN — INSULIN GLARGINE 25 UNITS: 100 INJECTION, SOLUTION SUBCUTANEOUS at 22:40

## 2020-11-24 RX ADMIN — ACETAMINOPHEN ORAL SOLUTION 650 MG: 650 SOLUTION ORAL at 03:44

## 2020-11-24 RX ADMIN — PANTOPRAZOLE 40 MG: 40 TABLET, DELAYED RELEASE ORAL at 08:00

## 2020-11-24 RX ADMIN — HEPARIN SODIUM 14 UNITS/KG/HR: 10000 INJECTION, SOLUTION INTRAVENOUS at 10:03

## 2020-11-24 RX ADMIN — CEFEPIME 2000 MG: 2 INJECTION, POWDER, FOR SOLUTION INTRAVENOUS at 22:45

## 2020-11-24 RX ADMIN — PROPOFOL 50 MCG/KG/MIN: 10 INJECTION, EMULSION INTRAVENOUS at 20:31

## 2020-11-24 RX ADMIN — CHLORHEXIDINE GLUCONATE 15 ML: 1.2 RINSE ORAL at 08:00

## 2020-11-24 RX ADMIN — INSULIN HUMAN 7 UNITS: 100 INJECTION, SOLUTION PARENTERAL at 23:59

## 2020-11-24 RX ADMIN — HYDROCORTISONE SODIUM SUCCINATE 100 MG: 100 INJECTION, POWDER, FOR SOLUTION INTRAMUSCULAR; INTRAVENOUS at 15:00

## 2020-11-24 RX ADMIN — SODIUM CHLORIDE, PRESERVATIVE FREE 2 ML: 5 INJECTION INTRAVENOUS at 08:00

## 2020-11-24 RX ADMIN — MINERAL OIL, PETROLATUM 1 APPLICATION.: 425; 573 OINTMENT OPHTHALMIC at 04:00

## 2020-11-24 RX ADMIN — HYDROCORTISONE SODIUM SUCCINATE 100 MG: 100 INJECTION, POWDER, FOR SOLUTION INTRAMUSCULAR; INTRAVENOUS at 06:17

## 2020-11-24 RX ADMIN — FUROSEMIDE 20 MG: 10 INJECTION, SOLUTION INTRAMUSCULAR; INTRAVENOUS at 20:32

## 2020-11-24 RX ADMIN — POTASSIUM CHLORIDE 40 MEQ: 1.5 POWDER, FOR SOLUTION ORAL at 17:20

## 2020-11-24 RX ADMIN — MINERAL OIL, PETROLATUM 1 APPLICATION.: 425; 573 OINTMENT OPHTHALMIC at 12:45

## 2020-11-24 RX ADMIN — FUROSEMIDE 20 MG: 10 INJECTION, SOLUTION INTRAMUSCULAR; INTRAVENOUS at 08:00

## 2020-11-24 RX ADMIN — CEFEPIME 2000 MG: 2 INJECTION, POWDER, FOR SOLUTION INTRAVENOUS at 15:14

## 2020-11-24 RX ADMIN — PROPOFOL 50 MCG/KG/MIN: 10 INJECTION, EMULSION INTRAVENOUS at 13:03

## 2020-11-24 RX ADMIN — POTASSIUM CHLORIDE 40 MEQ: 1.5 POWDER, FOR SOLUTION ORAL at 07:55

## 2020-11-24 RX ADMIN — INSULIN HUMAN 7 UNITS: 100 INJECTION, SOLUTION PARENTERAL at 00:00

## 2020-11-24 RX ADMIN — Medication 100 MCG/HR: at 15:15

## 2020-11-24 RX ADMIN — PROPOFOL 40 MCG/KG/MIN: 10 INJECTION, EMULSION INTRAVENOUS at 07:55

## 2020-11-24 RX ADMIN — POTASSIUM CHLORIDE 40 MEQ: 1.5 POWDER, FOR SOLUTION ORAL at 03:49

## 2020-11-24 RX ADMIN — HYDROCORTISONE SODIUM SUCCINATE 100 MG: 100 INJECTION, POWDER, FOR SOLUTION INTRAMUSCULAR; INTRAVENOUS at 20:32

## 2020-11-24 RX ADMIN — INSULIN HUMAN 7 UNITS: 100 INJECTION, SOLUTION PARENTERAL at 06:19

## 2020-11-24 RX ADMIN — PROPOFOL 50 MCG/KG/MIN: 10 INJECTION, EMULSION INTRAVENOUS at 17:29

## 2020-11-24 RX ADMIN — CEFEPIME 2000 MG: 2 INJECTION, POWDER, FOR SOLUTION INTRAVENOUS at 06:17

## 2020-11-24 RX ADMIN — PROPOFOL 35 MCG/KG/MIN: 10 INJECTION, EMULSION INTRAVENOUS at 03:06

## 2020-11-24 RX ADMIN — MINERAL OIL, PETROLATUM 1 APPLICATION.: 425; 573 OINTMENT OPHTHALMIC at 07:56

## 2020-11-24 RX ADMIN — SODIUM CHLORIDE, PRESERVATIVE FREE 2 ML: 5 INJECTION INTRAVENOUS at 20:39

## 2020-11-24 RX ADMIN — CHLORHEXIDINE GLUCONATE 15 ML: 1.2 RINSE ORAL at 20:32

## 2020-11-24 RX ADMIN — MINERAL OIL, PETROLATUM 1 APPLICATION.: 425; 573 OINTMENT OPHTHALMIC at 15:16

## 2020-11-24 RX ADMIN — MINERAL OIL, PETROLATUM 1 APPLICATION.: 425; 573 OINTMENT OPHTHALMIC at 00:00

## 2020-11-24 ASSESSMENT — PAIN SCALES - BEHAVIORAL PAIN SCALE (BPS)
BPS_SCORE: 6
BPS_SCORE: 4
BPS_SCORE: 4

## 2020-11-25 ENCOUNTER — APPOINTMENT (OUTPATIENT)
Dept: GENERAL RADIOLOGY | Age: 55
DRG: 004 | End: 2020-11-25
Attending: EMERGENCY MEDICINE

## 2020-11-25 LAB
ALBUMIN SERPL-MCNC: 2.5 G/DL (ref 3.6–5.1)
ALBUMIN SERPL-MCNC: NORMAL G/DL (ref 3.6–5.1)
ALBUMIN/GLOB SERPL: 0.7 {RATIO} (ref 1–2.4)
ALBUMIN/GLOB SERPL: NORMAL {RATIO} (ref 1–2.4)
ALP SERPL-CCNC: 133 UNITS/L (ref 45–117)
ALP SERPL-CCNC: NORMAL UNITS/L (ref 45–117)
ALT SERPL-CCNC: 121 UNITS/L
ALT SERPL-CCNC: NORMAL UNITS/L
ANION GAP SERPL CALC-SCNC: 8 MMOL/L (ref 10–20)
ANION GAP SERPL CALC-SCNC: NORMAL MMOL/L (ref 10–20)
APTT PPP: 51 SEC (ref 22–30)
AST SERPL-CCNC: 37 UNITS/L
AST SERPL-CCNC: NORMAL UNITS/L
BASE EXCESS BLDA CALC-SCNC: 10 MMOL/L (ref 0–3)
BASOPHILS # BLD: 0 K/MCL (ref 0–0.3)
BASOPHILS NFR BLD: 0 %
BDY SITE: ABNORMAL
BILIRUB SERPL-MCNC: 0.5 MG/DL (ref 0.2–1)
BILIRUB SERPL-MCNC: NORMAL MG/DL (ref 0.2–1)
BODY TEMPERATURE: 37 DEGREES
BUN SERPL-MCNC: 36 MG/DL (ref 6–20)
BUN SERPL-MCNC: NORMAL MG/DL (ref 6–20)
BUN/CREAT SERPL: 50 (ref 7–25)
BUN/CREAT SERPL: NORMAL (ref 7–25)
CALCIUM SERPL-MCNC: 8.1 MG/DL (ref 8.4–10.2)
CALCIUM SERPL-MCNC: NORMAL MG/DL (ref 8.4–10.2)
CHLORIDE SERPL-SCNC: 103 MMOL/L (ref 98–107)
CHLORIDE SERPL-SCNC: NORMAL MMOL/L (ref 98–107)
CO2 SERPL-SCNC: 36 MMOL/L (ref 21–32)
CO2 SERPL-SCNC: NORMAL MMOL/L (ref 21–32)
CONDITION-RC: ABNORMAL
CREAT SERPL-MCNC: 0.72 MG/DL (ref 0.51–0.95)
CREAT SERPL-MCNC: NORMAL MG/DL (ref 0.51–0.95)
D DIMER PPP FEU-MCNC: 2.42 MG/L (FEU)
DIFFERENTIAL METHOD BLD: ABNORMAL
EOSINOPHIL # BLD: 0.1 K/MCL (ref 0.1–0.5)
EOSINOPHIL NFR BLD: 1 %
ERYTHROCYTE [DISTWIDTH] IN BLOOD: 14.4 % (ref 11–15)
GLOBULIN SER-MCNC: 3.7 G/DL (ref 2–4)
GLOBULIN SER-MCNC: NORMAL G/DL (ref 2–4)
GLUCOSE BLDC GLUCOMTR-MCNC: 135 MG/DL (ref 70–99)
GLUCOSE BLDC GLUCOMTR-MCNC: 151 MG/DL (ref 70–99)
GLUCOSE BLDC GLUCOMTR-MCNC: 156 MG/DL (ref 70–99)
GLUCOSE BLDC GLUCOMTR-MCNC: 190 MG/DL (ref 70–99)
GLUCOSE SERPL-MCNC: 117 MG/DL (ref 65–99)
GLUCOSE SERPL-MCNC: NORMAL MG/DL (ref 65–99)
HCO3 BLDA-SCNC: 37 MMOL/L (ref 22–28)
HCT VFR BLD CALC: 30.1 % (ref 36–46.5)
HGB BLD-MCNC: 9.2 G/DL (ref 12–15.5)
HOROWITZ INDEX BLD+IHG-RTO: 50 %
HOROWITZ INDEX BLDA+IHG-RTO: 128 MM[HG] (ref 300–500)
IL6 SERPL-MCNC: 6.1 PG/ML
LYMPHOCYTES # BLD: 0.4 K/MCL (ref 1–4)
LYMPHOCYTES NFR BLD: 6 %
MAGNESIUM SERPL-MCNC: 2.6 MG/DL (ref 1.7–2.4)
MCH RBC QN AUTO: 28.5 PG (ref 26–34)
MCHC RBC AUTO-ENTMCNC: 30.6 G/DL (ref 32–36.5)
MCV RBC AUTO: 93.2 FL (ref 78–100)
METAMYELOCYTES NFR BLD: 3 % (ref 0–2)
MONOCYTES # BLD: 0.2 K/MCL (ref 0.3–0.9)
MONOCYTES NFR BLD: 3 %
MYELOCYTES NFR BLD: 1 %
NEUTROPHILS # BLD: 6.4 K/MCL (ref 1.8–7.7)
NEUTS SEG NFR BLD: 86 %
NRBC BLD MANUAL-RTO: 0 /100 WBC
PCO2 BLDA: 59 MM HG (ref 32–45)
PH BLDA: 7.4 UNITS (ref 7.35–7.45)
PHOSPHATE SERPL-MCNC: 3.5 MG/DL (ref 2.4–4.7)
PLAT MORPH BLD: NORMAL
PLATELET # BLD: 196 K/MCL (ref 140–450)
PO2 BLDA: 64 MM HG (ref 83–108)
POTASSIUM SERPL-SCNC: 3.5 MMOL/L (ref 3.4–5.1)
POTASSIUM SERPL-SCNC: 3.9 MMOL/L (ref 3.4–5.1)
POTASSIUM SERPL-SCNC: NORMAL MMOL/L (ref 3.4–5.1)
PROT SERPL-MCNC: 6.2 G/DL (ref 6.4–8.2)
PROT SERPL-MCNC: NORMAL G/DL (ref 6.4–8.2)
RBC # BLD: 3.23 MIL/MCL (ref 4–5.2)
RBC MORPH BLD: NORMAL
SAO2 % BLDA: 92 % (ref 95–99)
SODIUM SERPL-SCNC: 143 MMOL/L (ref 135–145)
SODIUM SERPL-SCNC: NORMAL MMOL/L (ref 135–145)
TRIGL SERPL-MCNC: 265 MG/DL
WBC # BLD: 7.4 K/MCL (ref 4.2–11)
WBC MORPH BLD: NORMAL

## 2020-11-25 PROCEDURE — 10002800 HB RX 250 W HCPCS: Performed by: INTERNAL MEDICINE

## 2020-11-25 PROCEDURE — 10004281 HB COUNTER-STAFF TIME PER 15 MIN

## 2020-11-25 PROCEDURE — 13003290 HB INO PER HOUR

## 2020-11-25 PROCEDURE — 10003585 HB ROOM CHARGE INTERMEDIATE CARE

## 2020-11-25 PROCEDURE — 10002800 HB RX 250 W HCPCS: Performed by: NURSE PRACTITIONER

## 2020-11-25 PROCEDURE — 82805 BLOOD GASES W/O2 SATURATION: CPT

## 2020-11-25 PROCEDURE — 94640 AIRWAY INHALATION TREATMENT: CPT

## 2020-11-25 PROCEDURE — 71045 X-RAY EXAM CHEST 1 VIEW: CPT

## 2020-11-25 PROCEDURE — 10004651 HB RX, NO CHARGE ITEM: Performed by: INTERNAL MEDICINE

## 2020-11-25 PROCEDURE — 80053 COMPREHEN METABOLIC PANEL: CPT

## 2020-11-25 PROCEDURE — 10002800 HB RX 250 W HCPCS: Performed by: EMERGENCY MEDICINE

## 2020-11-25 PROCEDURE — 10002801 HB RX 250 W/O HCPCS

## 2020-11-25 PROCEDURE — 10002801 HB RX 250 W/O HCPCS: Performed by: EMERGENCY MEDICINE

## 2020-11-25 PROCEDURE — 10002807 HB RX 258: Performed by: INTERNAL MEDICINE

## 2020-11-25 PROCEDURE — 85025 COMPLETE CBC W/AUTO DIFF WBC: CPT

## 2020-11-25 PROCEDURE — 99233 SBSQ HOSP IP/OBS HIGH 50: CPT | Performed by: INTERNAL MEDICINE

## 2020-11-25 PROCEDURE — 84100 ASSAY OF PHOSPHORUS: CPT

## 2020-11-25 PROCEDURE — 10002801 HB RX 250 W/O HCPCS: Performed by: INTERNAL MEDICINE

## 2020-11-25 PROCEDURE — 85379 FIBRIN DEGRADATION QUANT: CPT

## 2020-11-25 PROCEDURE — 84478 ASSAY OF TRIGLYCERIDES: CPT

## 2020-11-25 PROCEDURE — 85730 THROMBOPLASTIN TIME PARTIAL: CPT

## 2020-11-25 PROCEDURE — 10002803 HB RX 637: Performed by: INTERNAL MEDICINE

## 2020-11-25 PROCEDURE — 84132 ASSAY OF SERUM POTASSIUM: CPT

## 2020-11-25 PROCEDURE — 94003 VENT MGMT INPAT SUBQ DAY: CPT

## 2020-11-25 PROCEDURE — 83735 ASSAY OF MAGNESIUM: CPT

## 2020-11-25 RX ORDER — MIDAZOLAM HYDROCHLORIDE 1 MG/ML
1 INJECTION, SOLUTION INTRAMUSCULAR; INTRAVENOUS ONCE
Status: COMPLETED | OUTPATIENT
Start: 2020-11-25 | End: 2020-11-25

## 2020-11-25 RX ORDER — POTASSIUM CHLORIDE 1.5 G/1.58G
40 POWDER, FOR SOLUTION ORAL ONCE
Status: COMPLETED | OUTPATIENT
Start: 2020-11-25 | End: 2020-11-25

## 2020-11-25 RX ORDER — MIDAZOLAM HYDROCHLORIDE 1 MG/ML
2 INJECTION, SOLUTION INTRAMUSCULAR; INTRAVENOUS ONCE
Status: COMPLETED | OUTPATIENT
Start: 2020-11-25 | End: 2020-11-25

## 2020-11-25 RX ORDER — ALBUTEROL SULFATE 2.5 MG/3ML
2.5 SOLUTION RESPIRATORY (INHALATION)
Status: DISCONTINUED | OUTPATIENT
Start: 2020-11-25 | End: 2020-12-02 | Stop reason: HOSPADM

## 2020-11-25 RX ORDER — ALBUTEROL SULFATE 2.5 MG/3ML
2.5 SOLUTION RESPIRATORY (INHALATION)
Status: DISCONTINUED | OUTPATIENT
Start: 2020-11-25 | End: 2020-11-27

## 2020-11-25 RX ORDER — ALBUTEROL SULFATE 2.5 MG/3ML
SOLUTION RESPIRATORY (INHALATION)
Status: COMPLETED
Start: 2020-11-25 | End: 2020-11-25

## 2020-11-25 RX ADMIN — PROPOFOL 50 MCG/KG/MIN: 10 INJECTION, EMULSION INTRAVENOUS at 10:37

## 2020-11-25 RX ADMIN — PROPOFOL 50 MCG/KG/MIN: 10 INJECTION, EMULSION INTRAVENOUS at 23:14

## 2020-11-25 RX ADMIN — ALBUTEROL SULFATE 2.5 MG: 2.5 SOLUTION RESPIRATORY (INHALATION) at 16:12

## 2020-11-25 RX ADMIN — INSULIN HUMAN 5 UNITS: 100 INJECTION, SOLUTION PARENTERAL at 17:29

## 2020-11-25 RX ADMIN — ALBUTEROL SULFATE 2.5 MG: 2.5 SOLUTION RESPIRATORY (INHALATION) at 12:32

## 2020-11-25 RX ADMIN — MIDAZOLAM HYDROCHLORIDE 1 MG: 1 INJECTION, SOLUTION INTRAMUSCULAR; INTRAVENOUS at 12:16

## 2020-11-25 RX ADMIN — FUROSEMIDE 20 MG: 10 INJECTION, SOLUTION INTRAMUSCULAR; INTRAVENOUS at 20:34

## 2020-11-25 RX ADMIN — MIDAZOLAM HYDROCHLORIDE 2 MG: 1 INJECTION, SOLUTION INTRAMUSCULAR; INTRAVENOUS at 17:22

## 2020-11-25 RX ADMIN — POTASSIUM CHLORIDE 40 MEQ: 1.5 FOR SOLUTION ORAL at 11:16

## 2020-11-25 RX ADMIN — MINERAL OIL, PETROLATUM 1 APPLICATION.: 425; 573 OINTMENT OPHTHALMIC at 20:39

## 2020-11-25 RX ADMIN — CHLORHEXIDINE GLUCONATE 15 ML: 1.2 RINSE ORAL at 20:36

## 2020-11-25 RX ADMIN — PROPOFOL 50 MCG/KG/MIN: 10 INJECTION, EMULSION INTRAVENOUS at 03:20

## 2020-11-25 RX ADMIN — CHLORHEXIDINE GLUCONATE 15 ML: 1.2 RINSE ORAL at 08:47

## 2020-11-25 RX ADMIN — ROCURONIUM BROMIDE 50 MG: 10 INJECTION INTRAVENOUS at 00:04

## 2020-11-25 RX ADMIN — MINERAL OIL, PETROLATUM 1 APPLICATION.: 425; 573 OINTMENT OPHTHALMIC at 11:20

## 2020-11-25 RX ADMIN — POTASSIUM CHLORIDE 40 MEQ: 1.5 POWDER, FOR SOLUTION ORAL at 17:23

## 2020-11-25 RX ADMIN — SODIUM CHLORIDE, PRESERVATIVE FREE 2 ML: 5 INJECTION INTRAVENOUS at 20:36

## 2020-11-25 RX ADMIN — CEFEPIME 2000 MG: 2 INJECTION, POWDER, FOR SOLUTION INTRAVENOUS at 13:46

## 2020-11-25 RX ADMIN — PROPOFOL 50 MCG/KG/MIN: 10 INJECTION, EMULSION INTRAVENOUS at 06:37

## 2020-11-25 RX ADMIN — MINERAL OIL, PETROLATUM 1 APPLICATION.: 425; 573 OINTMENT OPHTHALMIC at 17:00

## 2020-11-25 RX ADMIN — PANTOPRAZOLE 40 MG: 40 TABLET, DELAYED RELEASE ORAL at 08:47

## 2020-11-25 RX ADMIN — INSULIN HUMAN 5 UNITS: 100 INJECTION, SOLUTION PARENTERAL at 08:00

## 2020-11-25 RX ADMIN — INSULIN GLARGINE 25 UNITS: 100 INJECTION, SOLUTION SUBCUTANEOUS at 20:32

## 2020-11-25 RX ADMIN — PROPOFOL 50 MCG/KG/MIN: 10 INJECTION, EMULSION INTRAVENOUS at 14:45

## 2020-11-25 RX ADMIN — HYDROCORTISONE SODIUM SUCCINATE 100 MG: 100 INJECTION, POWDER, FOR SOLUTION INTRAMUSCULAR; INTRAVENOUS at 13:47

## 2020-11-25 RX ADMIN — ROCURONIUM BROMIDE 50 MG: 10 INJECTION INTRAVENOUS at 06:49

## 2020-11-25 RX ADMIN — MINERAL OIL, PETROLATUM 1 APPLICATION.: 425; 573 OINTMENT OPHTHALMIC at 08:12

## 2020-11-25 RX ADMIN — MINERAL OIL, PETROLATUM 1 APPLICATION.: 425; 573 OINTMENT OPHTHALMIC at 00:02

## 2020-11-25 RX ADMIN — ALBUTEROL SULFATE 2.5 MG: 2.5 SOLUTION RESPIRATORY (INHALATION) at 20:30

## 2020-11-25 RX ADMIN — Medication 125 MCG/HR: at 11:18

## 2020-11-25 RX ADMIN — SODIUM CHLORIDE, PRESERVATIVE FREE 2 ML: 5 INJECTION INTRAVENOUS at 08:47

## 2020-11-25 RX ADMIN — HYDROCORTISONE SODIUM SUCCINATE 100 MG: 100 INJECTION, POWDER, FOR SOLUTION INTRAMUSCULAR; INTRAVENOUS at 06:38

## 2020-11-25 RX ADMIN — MINERAL OIL, PETROLATUM 1 APPLICATION.: 425; 573 OINTMENT OPHTHALMIC at 23:20

## 2020-11-25 RX ADMIN — FUROSEMIDE 20 MG: 10 INJECTION, SOLUTION INTRAMUSCULAR; INTRAVENOUS at 08:47

## 2020-11-25 RX ADMIN — PROPOFOL 50 MCG/KG/MIN: 10 INJECTION, EMULSION INTRAVENOUS at 00:07

## 2020-11-25 RX ADMIN — HYDROCORTISONE SODIUM SUCCINATE 100 MG: 100 INJECTION, POWDER, FOR SOLUTION INTRAMUSCULAR; INTRAVENOUS at 20:36

## 2020-11-25 RX ADMIN — INSULIN HUMAN 7 UNITS: 100 INJECTION, SOLUTION PARENTERAL at 23:18

## 2020-11-25 RX ADMIN — ACETAMINOPHEN ORAL SOLUTION 650 MG: 650 SOLUTION ORAL at 17:23

## 2020-11-25 RX ADMIN — INSULIN HUMAN 7 UNITS: 100 INJECTION, SOLUTION PARENTERAL at 11:27

## 2020-11-25 RX ADMIN — CEFEPIME 2000 MG: 2 INJECTION, POWDER, FOR SOLUTION INTRAVENOUS at 06:41

## 2020-11-25 RX ADMIN — HEPARIN SODIUM 14 UNITS/KG/HR: 10000 INJECTION, SOLUTION INTRAVENOUS at 08:11

## 2020-11-25 RX ADMIN — CEFEPIME 2000 MG: 2 INJECTION, POWDER, FOR SOLUTION INTRAVENOUS at 20:39

## 2020-11-25 ASSESSMENT — PULMONARY FUNCTION TESTS
FEV1/FVC: UNABLE TO OBTAIN, OR GREATER THAN 70%
FEV1_PREDICTED: 0
FEV1: 0
FEV1: 0
FEV1_PREDICTED: 0
FEV1: 0

## 2020-11-25 ASSESSMENT — PATIENT HEALTH QUESTIONNAIRE - PHQ9: IS PATIENT ABLE TO COMPLETE PHQ2 OR PHQ9: NO, PATIENT WILL NEVER BE ABLE TO COMPLETE

## 2020-11-25 ASSESSMENT — PAIN SCALES - BEHAVIORAL PAIN SCALE (BPS)
BPS_SCORE: 6
BPS_SCORE: 7

## 2020-11-26 ENCOUNTER — APPOINTMENT (OUTPATIENT)
Dept: GENERAL RADIOLOGY | Age: 55
DRG: 004 | End: 2020-11-26
Attending: INTERNAL MEDICINE

## 2020-11-26 LAB
APTT PPP: 57 SEC (ref 22–30)
BASE EXCESS BLDA CALC-SCNC: 12 MMOL/L (ref 0–3)
BASE EXCESS BLDA CALC-SCNC: 9 MMOL/L (ref 0–3)
BDY SITE: ABNORMAL
BDY SITE: ABNORMAL
BODY TEMPERATURE: 36.4 DEGREES
BODY TEMPERATURE: 37 DEGREES
CA-I BLD ISE-SCNC: 1.14 MMOL/L (ref 1.15–1.29)
CA-I BLDA-SCNC: 14 % (ref 15–23)
CA-I BLDA-SCNC: 14 % (ref 15–23)
CHLORIDE BLD-SCNC: 100 MMOL/L (ref 98–107)
COHGB MFR BLD: 1.4 %
COHGB MFR BLD: 1.9 %
CONDITION-RC: ABNORMAL
CONDITION-RC: ABNORMAL
GLUCOSE BLD-MCNC: 177 MG/DL (ref 65–99)
GLUCOSE BLDC GLUCOMTR-MCNC: 169 MG/DL (ref 70–99)
GLUCOSE BLDC GLUCOMTR-MCNC: 174 MG/DL (ref 70–99)
GLUCOSE BLDC GLUCOMTR-MCNC: 183 MG/DL (ref 70–99)
GLUCOSE BLDC GLUCOMTR-MCNC: 252 MG/DL (ref 70–99)
HCO3 BLDA-SCNC: 36 MMOL/L (ref 22–28)
HCO3 BLDA-SCNC: 37 MMOL/L (ref 22–28)
HCT VFR BLD CALC: 29 % (ref 36–46.5)
HCT VFR BLD CALC: 32 % (ref 36–46.5)
HGB BLD-MCNC: 10.6 G/DL (ref 12–15.5)
HGB BLD-MCNC: 9.8 G/DL (ref 12–15.5)
HOROWITZ INDEX BLD+IHG-RTO: 60 %
HOROWITZ INDEX BLD+IHG-RTO: 60 %
HOROWITZ INDEX BLDA+IHG-RTO: 165 MM[HG] (ref 300–500)
HOROWITZ INDEX BLDA+IHG-RTO: 177 MM[HG] (ref 300–500)
LACTATE BLDA-MCNC: 2 MMOL/L
METHGB MFR BLD: 0.4 %
METHGB MFR BLD: 0.8 %
OXYHGB MFR BLD: 96.2 % (ref 94–98)
OXYHGB MFR BLD: 97.2 % (ref 94–98)
PCO2 BLDA: 52 MM HG (ref 32–45)
PCO2 BLDA: 59 MM HG (ref 32–45)
PH BLDA: 7.39 UNITS (ref 7.35–7.45)
PH BLDA: 7.46 UNITS (ref 7.35–7.45)
PO2 BLDA: 106 MM HG (ref 83–108)
PO2 BLDA: 95 MM HG (ref 83–108)
POTASSIUM BLD-SCNC: 3.2 MMOL/L (ref 3.4–5.1)
POTASSIUM SERPL-SCNC: 3.8 MMOL/L (ref 3.4–5.1)
PROCALCITONIN SERPL IA-MCNC: 0.43 NG/ML
SAO2 % BLDA: 99 % (ref 95–99)
SAO2 % BLDA: 99 % (ref 95–99)
SARS-COV-2 RNA RESP QL NAA+PROBE: DETECTED
SODIUM BLD-SCNC: 140 MMOL/L (ref 135–145)
SPECIMEN SOURCE: ABNORMAL

## 2020-11-26 PROCEDURE — 99233 SBSQ HOSP IP/OBS HIGH 50: CPT | Performed by: INTERNAL MEDICINE

## 2020-11-26 PROCEDURE — 10003585 HB ROOM CHARGE INTERMEDIATE CARE

## 2020-11-26 PROCEDURE — 84132 ASSAY OF SERUM POTASSIUM: CPT

## 2020-11-26 PROCEDURE — 83605 ASSAY OF LACTIC ACID: CPT

## 2020-11-26 PROCEDURE — 10002800 HB RX 250 W HCPCS: Performed by: INTERNAL MEDICINE

## 2020-11-26 PROCEDURE — 83050 HGB METHEMOGLOBIN QUAN: CPT

## 2020-11-26 PROCEDURE — 82805 BLOOD GASES W/O2 SATURATION: CPT

## 2020-11-26 PROCEDURE — 94640 AIRWAY INHALATION TREATMENT: CPT

## 2020-11-26 PROCEDURE — 10002803 HB RX 637: Performed by: INTERNAL MEDICINE

## 2020-11-26 PROCEDURE — 84145 PROCALCITONIN (PCT): CPT

## 2020-11-26 PROCEDURE — 10004651 HB RX, NO CHARGE ITEM: Performed by: INTERNAL MEDICINE

## 2020-11-26 PROCEDURE — 10004281 HB COUNTER-STAFF TIME PER 15 MIN

## 2020-11-26 PROCEDURE — 82947 ASSAY GLUCOSE BLOOD QUANT: CPT

## 2020-11-26 PROCEDURE — 71045 X-RAY EXAM CHEST 1 VIEW: CPT

## 2020-11-26 PROCEDURE — 10002807 HB RX 258: Performed by: INTERNAL MEDICINE

## 2020-11-26 PROCEDURE — 82330 ASSAY OF CALCIUM: CPT

## 2020-11-26 PROCEDURE — 10002800 HB RX 250 W HCPCS

## 2020-11-26 PROCEDURE — 10002801 HB RX 250 W/O HCPCS

## 2020-11-26 PROCEDURE — U0003 INFECTIOUS AGENT DETECTION BY NUCLEIC ACID (DNA OR RNA); SEVERE ACUTE RESPIRATORY SYNDROME CORONAVIRUS 2 (SARS-COV-2) (CORONAVIRUS DISEASE [COVID-19]), AMPLIFIED PROBE TECHNIQUE, MAKING USE OF HIGH THROUGHPUT TECHNOLOGIES AS DESCRIBED BY CMS-2020-01-R: HCPCS

## 2020-11-26 PROCEDURE — 10002801 HB RX 250 W/O HCPCS: Performed by: EMERGENCY MEDICINE

## 2020-11-26 PROCEDURE — 85730 THROMBOPLASTIN TIME PARTIAL: CPT

## 2020-11-26 PROCEDURE — 94003 VENT MGMT INPAT SUBQ DAY: CPT

## 2020-11-26 PROCEDURE — 10002801 HB RX 250 W/O HCPCS: Performed by: INTERNAL MEDICINE

## 2020-11-26 RX ORDER — FUROSEMIDE 10 MG/ML
40 INJECTION INTRAMUSCULAR; INTRAVENOUS ONCE
Status: COMPLETED | OUTPATIENT
Start: 2020-11-26 | End: 2020-11-26

## 2020-11-26 RX ORDER — NOREPINEPHRINE BITARTRATE 0.03 MG/ML
0-30 INJECTION, SOLUTION INTRAVENOUS CONTINUOUS
Status: DISCONTINUED | OUTPATIENT
Start: 2020-11-26 | End: 2020-11-29

## 2020-11-26 RX ORDER — INSULIN GLARGINE 100 [IU]/ML
30 INJECTION, SOLUTION SUBCUTANEOUS NIGHTLY
Status: DISCONTINUED | OUTPATIENT
Start: 2020-11-26 | End: 2020-11-27

## 2020-11-26 RX ORDER — MIDAZOLAM HYDROCHLORIDE 1 MG/ML
2 INJECTION, SOLUTION INTRAMUSCULAR; INTRAVENOUS ONCE
Status: COMPLETED | OUTPATIENT
Start: 2020-11-26 | End: 2020-11-26

## 2020-11-26 RX ORDER — AMOXICILLIN 250 MG
1 CAPSULE ORAL 2 TIMES DAILY
Status: DISCONTINUED | OUTPATIENT
Start: 2020-11-26 | End: 2020-11-28

## 2020-11-26 RX ORDER — ACETAZOLAMIDE 500 MG/5ML
250 INJECTION, POWDER, LYOPHILIZED, FOR SOLUTION INTRAVENOUS EVERY 12 HOURS
Status: DISCONTINUED | OUTPATIENT
Start: 2020-11-26 | End: 2020-11-29

## 2020-11-26 RX ORDER — QUETIAPINE FUMARATE 25 MG/1
100 TABLET, FILM COATED ORAL EVERY 12 HOURS SCHEDULED
Status: DISCONTINUED | OUTPATIENT
Start: 2020-11-26 | End: 2020-11-28

## 2020-11-26 RX ORDER — MIDAZOLAM HYDROCHLORIDE 2 MG/2ML
INJECTION, SOLUTION INTRAMUSCULAR; INTRAVENOUS
Status: COMPLETED
Start: 2020-11-26 | End: 2020-11-26

## 2020-11-26 RX ORDER — POTASSIUM CHLORIDE 1.5 G/1.58G
40 POWDER, FOR SOLUTION ORAL ONCE
Status: COMPLETED | OUTPATIENT
Start: 2020-11-26 | End: 2020-11-26

## 2020-11-26 RX ORDER — HYDRALAZINE HYDROCHLORIDE 20 MG/ML
10 INJECTION INTRAMUSCULAR; INTRAVENOUS EVERY 4 HOURS PRN
Status: DISCONTINUED | OUTPATIENT
Start: 2020-11-26 | End: 2020-12-02 | Stop reason: HOSPADM

## 2020-11-26 RX ORDER — NOREPINEPHRINE BITARTRATE 0.03 MG/ML
INJECTION, SOLUTION INTRAVENOUS
Status: COMPLETED
Start: 2020-11-26 | End: 2020-11-26

## 2020-11-26 RX ORDER — ROCURONIUM BROMIDE 10 MG/ML
50 INJECTION, SOLUTION INTRAVENOUS ONCE
Status: DISCONTINUED | OUTPATIENT
Start: 2020-11-26 | End: 2020-12-01

## 2020-11-26 RX ADMIN — ALBUTEROL SULFATE 2.5 MG: 2.5 SOLUTION RESPIRATORY (INHALATION) at 20:15

## 2020-11-26 RX ADMIN — INSULIN HUMAN 7 UNITS: 100 INJECTION, SOLUTION PARENTERAL at 05:20

## 2020-11-26 RX ADMIN — ALBUTEROL SULFATE 2.5 MG: 2.5 SOLUTION RESPIRATORY (INHALATION) at 00:45

## 2020-11-26 RX ADMIN — PROPOFOL 40 MCG/KG/MIN: 10 INJECTION, EMULSION INTRAVENOUS at 19:00

## 2020-11-26 RX ADMIN — Medication 3 MG/HR: at 11:57

## 2020-11-26 RX ADMIN — NOREPINEPHRINE BITARTRATE 2 MCG/MIN: 0.03 INJECTION, SOLUTION INTRAVENOUS at 13:00

## 2020-11-26 RX ADMIN — CEFEPIME 2000 MG: 2 INJECTION, POWDER, FOR SOLUTION INTRAVENOUS at 14:39

## 2020-11-26 RX ADMIN — FUROSEMIDE 40 MG: 10 INJECTION, SOLUTION INTRAMUSCULAR; INTRAVENOUS at 09:16

## 2020-11-26 RX ADMIN — DOCUSATE SODIUM AND SENNOSIDES 1 TABLET: 8.6; 5 TABLET, FILM COATED ORAL at 20:41

## 2020-11-26 RX ADMIN — CHLORHEXIDINE GLUCONATE 15 ML: 1.2 RINSE ORAL at 09:15

## 2020-11-26 RX ADMIN — ALBUTEROL SULFATE 2.5 MG: 2.5 SOLUTION RESPIRATORY (INHALATION) at 16:45

## 2020-11-26 RX ADMIN — PROPOFOL 50 MCG/KG/MIN: 10 INJECTION, EMULSION INTRAVENOUS at 06:16

## 2020-11-26 RX ADMIN — CHLORHEXIDINE GLUCONATE 15 ML: 1.2 RINSE ORAL at 20:30

## 2020-11-26 RX ADMIN — ALBUTEROL SULFATE 2.5 MG: 2.5 SOLUTION RESPIRATORY (INHALATION) at 04:30

## 2020-11-26 RX ADMIN — QUETIAPINE 100 MG: 25 TABLET, FILM COATED ORAL at 11:47

## 2020-11-26 RX ADMIN — DOCUSATE SODIUM AND SENNOSIDES 1 TABLET: 8.6; 5 TABLET, FILM COATED ORAL at 14:36

## 2020-11-26 RX ADMIN — MIDAZOLAM HYDROCHLORIDE 2 MG: 1 INJECTION, SOLUTION INTRAMUSCULAR; INTRAVENOUS at 10:17

## 2020-11-26 RX ADMIN — HYDROCORTISONE SODIUM SUCCINATE 100 MG: 100 INJECTION, POWDER, FOR SOLUTION INTRAMUSCULAR; INTRAVENOUS at 14:36

## 2020-11-26 RX ADMIN — MINERAL OIL, PETROLATUM 1 APPLICATION.: 425; 573 OINTMENT OPHTHALMIC at 15:19

## 2020-11-26 RX ADMIN — PROPOFOL 40 MCG/KG/MIN: 10 INJECTION, EMULSION INTRAVENOUS at 14:36

## 2020-11-26 RX ADMIN — HEPARIN SODIUM 14 UNITS/KG/HR: 10000 INJECTION, SOLUTION INTRAVENOUS at 03:43

## 2020-11-26 RX ADMIN — POTASSIUM CHLORIDE 40 MEQ: 1.5 POWDER, FOR SOLUTION ORAL at 11:45

## 2020-11-26 RX ADMIN — HYDROCORTISONE SODIUM SUCCINATE 100 MG: 100 INJECTION, POWDER, FOR SOLUTION INTRAMUSCULAR; INTRAVENOUS at 05:45

## 2020-11-26 RX ADMIN — ROCURONIUM BROMIDE 50 MG: 10 INJECTION INTRAVENOUS at 00:27

## 2020-11-26 RX ADMIN — ACETAZOLAMIDE 250 MG: 500 INJECTION, POWDER, LYOPHILIZED, FOR SOLUTION INTRAVENOUS at 09:16

## 2020-11-26 RX ADMIN — QUETIAPINE 100 MG: 25 TABLET, FILM COATED ORAL at 20:41

## 2020-11-26 RX ADMIN — ALBUTEROL SULFATE 2.5 MG: 2.5 SOLUTION RESPIRATORY (INHALATION) at 12:20

## 2020-11-26 RX ADMIN — SODIUM CHLORIDE, PRESERVATIVE FREE 2 ML: 5 INJECTION INTRAVENOUS at 09:19

## 2020-11-26 RX ADMIN — Medication 2 MCG/MIN: at 13:00

## 2020-11-26 RX ADMIN — MINERAL OIL, PETROLATUM 1 APPLICATION.: 425; 573 OINTMENT OPHTHALMIC at 11:45

## 2020-11-26 RX ADMIN — SODIUM CHLORIDE, PRESERVATIVE FREE 2 ML: 5 INJECTION INTRAVENOUS at 20:35

## 2020-11-26 RX ADMIN — Medication 175 MCG/HR: at 05:45

## 2020-11-26 RX ADMIN — HYDRALAZINE HYDROCHLORIDE 10 MG: 20 INJECTION INTRAMUSCULAR; INTRAVENOUS at 09:22

## 2020-11-26 RX ADMIN — MINERAL OIL, PETROLATUM 1 APPLICATION.: 425; 573 OINTMENT OPHTHALMIC at 09:16

## 2020-11-26 RX ADMIN — PROPOFOL 50 MCG/KG/MIN: 10 INJECTION, EMULSION INTRAVENOUS at 02:15

## 2020-11-26 RX ADMIN — INSULIN HUMAN 11 UNITS: 100 INJECTION, SOLUTION PARENTERAL at 11:43

## 2020-11-26 RX ADMIN — PANTOPRAZOLE 40 MG: 40 TABLET, DELAYED RELEASE ORAL at 09:16

## 2020-11-26 RX ADMIN — MINERAL OIL, PETROLATUM 1 APPLICATION.: 425; 573 OINTMENT OPHTHALMIC at 20:30

## 2020-11-26 RX ADMIN — INSULIN GLARGINE 30 UNITS: 100 INJECTION, SOLUTION SUBCUTANEOUS at 20:46

## 2020-11-26 RX ADMIN — CEFEPIME 2000 MG: 2 INJECTION, POWDER, FOR SOLUTION INTRAVENOUS at 20:34

## 2020-11-26 RX ADMIN — PROPOFOL 50 MCG/KG/MIN: 10 INJECTION, EMULSION INTRAVENOUS at 10:27

## 2020-11-26 RX ADMIN — ALBUTEROL SULFATE 2.5 MG: 2.5 SOLUTION RESPIRATORY (INHALATION) at 07:50

## 2020-11-26 RX ADMIN — CEFEPIME 2000 MG: 2 INJECTION, POWDER, FOR SOLUTION INTRAVENOUS at 05:45

## 2020-11-26 RX ADMIN — HYDROCORTISONE SODIUM SUCCINATE 100 MG: 100 INJECTION, POWDER, FOR SOLUTION INTRAMUSCULAR; INTRAVENOUS at 20:34

## 2020-11-26 RX ADMIN — INSULIN HUMAN 2 UNITS: 100 INJECTION, SOLUTION PARENTERAL at 18:12

## 2020-11-26 RX ADMIN — ACETAZOLAMIDE 250 MG: 500 INJECTION, POWDER, LYOPHILIZED, FOR SOLUTION INTRAVENOUS at 20:39

## 2020-11-26 RX ADMIN — MINERAL OIL, PETROLATUM 1 APPLICATION.: 425; 573 OINTMENT OPHTHALMIC at 03:47

## 2020-11-26 ASSESSMENT — PAIN SCALES - BEHAVIORAL PAIN SCALE (BPS)
BPS_SCORE: 5
BPS_SCORE: 7
BPS_SCORE: 3
BPS_SCORE: 3
BPS_SCORE: 7

## 2020-11-26 ASSESSMENT — MOVEMENT AND STRENGTH ASSESSMENTS
LLE_ASSESSMENT: TRACE/PALPABLE CONTRACTION OF MUSCLE, NO JOINT MOTION
RLE_ASSESSMENT: TRACE/PALPABLE CONTRACTION OF MUSCLE, NO JOINT MOTION
LUE_ASSESSMENT: FAIR/COMPLETE ROM AGAINST GRAVITY, NO ADDED RESISTANCE
RUE_ASSESSMENT: FAIR/COMPLETE ROM AGAINST GRAVITY, NO ADDED RESISTANCE

## 2020-11-27 LAB
ALBUMIN SERPL-MCNC: 2.4 G/DL (ref 3.6–5.1)
ALBUMIN/GLOB SERPL: 0.7 {RATIO} (ref 1–2.4)
ALP SERPL-CCNC: 104 UNITS/L (ref 45–117)
ALT SERPL-CCNC: 59 UNITS/L
ANION GAP SERPL CALC-SCNC: 8 MMOL/L (ref 10–20)
APTT PPP: 53 SEC (ref 22–30)
APTT PPP: 61 SEC (ref 22–30)
APTT PPP: 71 SEC (ref 22–30)
AST SERPL-CCNC: 19 UNITS/L
BASE EXCESS BLDA CALC-SCNC: 12 MMOL/L (ref 0–3)
BASOPHILS # BLD: 0 K/MCL (ref 0–0.3)
BASOPHILS NFR BLD: 0 %
BDY SITE: ABNORMAL
BILIRUB SERPL-MCNC: 0.4 MG/DL (ref 0.2–1)
BODY TEMPERATURE: 36.2 DEGREES
BUN SERPL-MCNC: 44 MG/DL (ref 6–20)
BUN/CREAT SERPL: 61 (ref 7–25)
CALCIUM SERPL-MCNC: 8.4 MG/DL (ref 8.4–10.2)
CHLORIDE SERPL-SCNC: 105 MMOL/L (ref 98–107)
CO2 SERPL-SCNC: 33 MMOL/L (ref 21–32)
CONDITION-RC: ABNORMAL
CREAT SERPL-MCNC: 0.72 MG/DL (ref 0.51–0.95)
D DIMER PPP FEU-MCNC: 1.01 MG/L (FEU)
DIFFERENTIAL METHOD BLD: ABNORMAL
EOSINOPHIL # BLD: 0 K/MCL (ref 0.1–0.5)
EOSINOPHIL NFR BLD: 0 %
ERYTHROCYTE [DISTWIDTH] IN BLOOD: 15.1 % (ref 11–15)
GLOBULIN SER-MCNC: 3.6 G/DL (ref 2–4)
GLUCOSE BLDC GLUCOMTR-MCNC: 102 MG/DL (ref 70–99)
GLUCOSE BLDC GLUCOMTR-MCNC: 111 MG/DL (ref 70–99)
GLUCOSE BLDC GLUCOMTR-MCNC: 162 MG/DL (ref 70–99)
GLUCOSE BLDC GLUCOMTR-MCNC: 81 MG/DL (ref 70–99)
GLUCOSE BLDC GLUCOMTR-MCNC: 85 MG/DL (ref 70–99)
GLUCOSE BLDC GLUCOMTR-MCNC: 88 MG/DL (ref 70–99)
GLUCOSE SERPL-MCNC: 112 MG/DL (ref 65–99)
HCO3 BLDA-SCNC: 38 MMOL/L (ref 22–28)
HCT VFR BLD CALC: 29 % (ref 36–46.5)
HGB BLD-MCNC: 8.9 G/DL (ref 12–15.5)
HOROWITZ INDEX BLD+IHG-RTO: 40 %
HOROWITZ INDEX BLDA+IHG-RTO: 155 MM[HG] (ref 300–500)
LYMPHOCYTES # BLD: 0.6 K/MCL (ref 1–4)
LYMPHOCYTES NFR BLD: 11 %
MAGNESIUM SERPL-MCNC: 2.8 MG/DL (ref 1.7–2.4)
MCH RBC QN AUTO: 28.8 PG (ref 26–34)
MCHC RBC AUTO-ENTMCNC: 30.7 G/DL (ref 32–36.5)
MCV RBC AUTO: 93.9 FL (ref 78–100)
METAMYELOCYTES NFR BLD: 3 % (ref 0–2)
MONOCYTES # BLD: 0.5 K/MCL (ref 0.3–0.9)
MONOCYTES NFR BLD: 8 %
MYELOCYTES NFR BLD: 1 %
NEUTROPHILS # BLD: 4.5 K/MCL (ref 1.8–7.7)
NEUTS BAND NFR BLD: 5 % (ref 0–10)
NEUTS SEG NFR BLD: 72 %
NRBC BLD MANUAL-RTO: 0 /100 WBC
PCO2 BLDA: 49 MM HG (ref 32–45)
PH BLDA: 7.49 UNITS (ref 7.35–7.45)
PHOSPHATE SERPL-MCNC: 3.2 MG/DL (ref 2.4–4.7)
PLAT MORPH BLD: NORMAL
PLATELET # BLD: 236 K/MCL (ref 140–450)
PO2 BLDA: 59 MM HG (ref 83–108)
POTASSIUM SERPL-SCNC: 3 MMOL/L (ref 3.4–5.1)
POTASSIUM SERPL-SCNC: 3.1 MMOL/L (ref 3.4–5.1)
POTASSIUM SERPL-SCNC: 3.3 MMOL/L (ref 3.4–5.1)
PROT SERPL-MCNC: 6 G/DL (ref 6.4–8.2)
RBC # BLD: 3.09 MIL/MCL (ref 4–5.2)
RBC MORPH BLD: NORMAL
SAO2 % BLDA: 93 % (ref 95–99)
SODIUM SERPL-SCNC: 143 MMOL/L (ref 135–145)
TRIGL SERPL-MCNC: 215 MG/DL
WBC # BLD: 5.9 K/MCL (ref 4.2–11)
WBC MORPH BLD: NORMAL

## 2020-11-27 PROCEDURE — 10002800 HB RX 250 W HCPCS: Performed by: INTERNAL MEDICINE

## 2020-11-27 PROCEDURE — 82805 BLOOD GASES W/O2 SATURATION: CPT

## 2020-11-27 PROCEDURE — 84132 ASSAY OF SERUM POTASSIUM: CPT

## 2020-11-27 PROCEDURE — 10004651 HB RX, NO CHARGE ITEM: Performed by: INTERNAL MEDICINE

## 2020-11-27 PROCEDURE — 85025 COMPLETE CBC W/AUTO DIFF WBC: CPT

## 2020-11-27 PROCEDURE — 10002807 HB RX 258: Performed by: INTERNAL MEDICINE

## 2020-11-27 PROCEDURE — 10002801 HB RX 250 W/O HCPCS: Performed by: INTERNAL MEDICINE

## 2020-11-27 PROCEDURE — 94003 VENT MGMT INPAT SUBQ DAY: CPT

## 2020-11-27 PROCEDURE — 10002801 HB RX 250 W/O HCPCS: Performed by: EMERGENCY MEDICINE

## 2020-11-27 PROCEDURE — 85379 FIBRIN DEGRADATION QUANT: CPT

## 2020-11-27 PROCEDURE — 10004281 HB COUNTER-STAFF TIME PER 15 MIN

## 2020-11-27 PROCEDURE — 94640 AIRWAY INHALATION TREATMENT: CPT

## 2020-11-27 PROCEDURE — 83735 ASSAY OF MAGNESIUM: CPT

## 2020-11-27 PROCEDURE — 80053 COMPREHEN METABOLIC PANEL: CPT

## 2020-11-27 PROCEDURE — 84478 ASSAY OF TRIGLYCERIDES: CPT

## 2020-11-27 PROCEDURE — 10003585 HB ROOM CHARGE INTERMEDIATE CARE

## 2020-11-27 PROCEDURE — 10002803 HB RX 637: Performed by: INTERNAL MEDICINE

## 2020-11-27 PROCEDURE — 85730 THROMBOPLASTIN TIME PARTIAL: CPT

## 2020-11-27 PROCEDURE — 10002803 HB RX 637: Performed by: NURSE PRACTITIONER

## 2020-11-27 PROCEDURE — 10002019 HB COUNTER RESP ASSESSMENT

## 2020-11-27 PROCEDURE — 84100 ASSAY OF PHOSPHORUS: CPT

## 2020-11-27 PROCEDURE — 99233 SBSQ HOSP IP/OBS HIGH 50: CPT | Performed by: INTERNAL MEDICINE

## 2020-11-27 RX ORDER — CLONAZEPAM 1 MG/1
2 TABLET ORAL EVERY 12 HOURS SCHEDULED
Status: DISCONTINUED | OUTPATIENT
Start: 2020-11-27 | End: 2020-11-28

## 2020-11-27 RX ORDER — POTASSIUM CHLORIDE 20 MEQ/1
60 TABLET, EXTENDED RELEASE ORAL
Status: DISCONTINUED | OUTPATIENT
Start: 2020-11-28 | End: 2020-11-27 | Stop reason: CLARIF

## 2020-11-27 RX ORDER — ALBUTEROL SULFATE 2.5 MG/3ML
2.5 SOLUTION RESPIRATORY (INHALATION)
Status: DISCONTINUED | OUTPATIENT
Start: 2020-11-27 | End: 2020-11-27

## 2020-11-27 RX ORDER — ALBUTEROL SULFATE 2.5 MG/3ML
2.5 SOLUTION RESPIRATORY (INHALATION)
Status: DISCONTINUED | OUTPATIENT
Start: 2020-11-27 | End: 2020-12-02 | Stop reason: HOSPADM

## 2020-11-27 RX ORDER — POTASSIUM CHLORIDE 1.5 G/1.58G
40 POWDER, FOR SOLUTION ORAL ONCE
Status: COMPLETED | OUTPATIENT
Start: 2020-11-27 | End: 2020-11-27

## 2020-11-27 RX ORDER — FENTANYL 75 UG/1
1 PATCH TRANSDERMAL
Status: DISCONTINUED | OUTPATIENT
Start: 2020-11-27 | End: 2020-12-02 | Stop reason: HOSPADM

## 2020-11-27 RX ORDER — FUROSEMIDE 10 MG/ML
40 INJECTION INTRAMUSCULAR; INTRAVENOUS DAILY
Status: DISCONTINUED | OUTPATIENT
Start: 2020-11-27 | End: 2020-11-29

## 2020-11-27 RX ORDER — POTASSIUM CHLORIDE 1.5 G/1.58G
60 POWDER, FOR SOLUTION ORAL ONCE
Status: COMPLETED | OUTPATIENT
Start: 2020-11-28 | End: 2020-11-28

## 2020-11-27 RX ORDER — POTASSIUM CHLORIDE 29.8 MG/ML
20 INJECTION INTRAVENOUS ONCE
Status: COMPLETED | OUTPATIENT
Start: 2020-11-27 | End: 2020-11-27

## 2020-11-27 RX ORDER — INSULIN GLARGINE 100 [IU]/ML
24 INJECTION, SOLUTION SUBCUTANEOUS NIGHTLY
Status: DISCONTINUED | OUTPATIENT
Start: 2020-11-27 | End: 2020-12-02 | Stop reason: HOSPADM

## 2020-11-27 RX ADMIN — MINERAL OIL, PETROLATUM 1 APPLICATION.: 425; 573 OINTMENT OPHTHALMIC at 16:36

## 2020-11-27 RX ADMIN — PANTOPRAZOLE 40 MG: 40 TABLET, DELAYED RELEASE ORAL at 08:43

## 2020-11-27 RX ADMIN — CHLORHEXIDINE GLUCONATE 15 ML: 1.2 RINSE ORAL at 08:42

## 2020-11-27 RX ADMIN — CEFEPIME 2000 MG: 2 INJECTION, POWDER, FOR SOLUTION INTRAVENOUS at 21:45

## 2020-11-27 RX ADMIN — MINERAL OIL, PETROLATUM 1 APPLICATION.: 425; 573 OINTMENT OPHTHALMIC at 12:59

## 2020-11-27 RX ADMIN — INSULIN HUMAN 5 UNITS: 100 INJECTION, SOLUTION PARENTERAL at 12:25

## 2020-11-27 RX ADMIN — ALBUTEROL SULFATE 2.5 MG: 2.5 SOLUTION RESPIRATORY (INHALATION) at 19:58

## 2020-11-27 RX ADMIN — PROPOFOL 30 MCG/KG/MIN: 10 INJECTION, EMULSION INTRAVENOUS at 01:08

## 2020-11-27 RX ADMIN — CHLORHEXIDINE GLUCONATE 15 ML: 1.2 RINSE ORAL at 20:30

## 2020-11-27 RX ADMIN — MINERAL OIL, PETROLATUM 1 APPLICATION.: 425; 573 OINTMENT OPHTHALMIC at 20:37

## 2020-11-27 RX ADMIN — ACETAZOLAMIDE 250 MG: 500 INJECTION, POWDER, LYOPHILIZED, FOR SOLUTION INTRAVENOUS at 08:42

## 2020-11-27 RX ADMIN — ALBUTEROL SULFATE 2.5 MG: 2.5 SOLUTION RESPIRATORY (INHALATION) at 08:01

## 2020-11-27 RX ADMIN — DOCUSATE SODIUM AND SENNOSIDES 1 TABLET: 8.6; 5 TABLET, FILM COATED ORAL at 08:43

## 2020-11-27 RX ADMIN — HYDROCORTISONE SODIUM SUCCINATE 100 MG: 100 INJECTION, POWDER, FOR SOLUTION INTRAMUSCULAR; INTRAVENOUS at 05:14

## 2020-11-27 RX ADMIN — ALBUTEROL SULFATE 2.5 MG: 2.5 SOLUTION RESPIRATORY (INHALATION) at 12:55

## 2020-11-27 RX ADMIN — MINERAL OIL, PETROLATUM 1 APPLICATION.: 425; 573 OINTMENT OPHTHALMIC at 23:12

## 2020-11-27 RX ADMIN — POTASSIUM CHLORIDE 40 MEQ: 1.5 POWDER, FOR SOLUTION ORAL at 08:42

## 2020-11-27 RX ADMIN — HYDROCORTISONE SODIUM SUCCINATE 50 MG: 100 INJECTION, POWDER, FOR SOLUTION INTRAMUSCULAR; INTRAVENOUS at 13:08

## 2020-11-27 RX ADMIN — INSULIN HUMAN 9 UNITS: 100 INJECTION, SOLUTION PARENTERAL at 00:07

## 2020-11-27 RX ADMIN — CLONAZEPAM 2 MG: 1 TABLET ORAL at 20:26

## 2020-11-27 RX ADMIN — HEPARIN SODIUM 14 UNITS/KG/HR: 10000 INJECTION, SOLUTION INTRAVENOUS at 01:43

## 2020-11-27 RX ADMIN — SODIUM CHLORIDE, PRESERVATIVE FREE 2 ML: 5 INJECTION INTRAVENOUS at 20:29

## 2020-11-27 RX ADMIN — MINERAL OIL, PETROLATUM 1 APPLICATION.: 425; 573 OINTMENT OPHTHALMIC at 08:43

## 2020-11-27 RX ADMIN — QUETIAPINE 100 MG: 25 TABLET, FILM COATED ORAL at 20:27

## 2020-11-27 RX ADMIN — POTASSIUM CHLORIDE 20 MEQ: 29.8 INJECTION, SOLUTION INTRAVENOUS at 17:25

## 2020-11-27 RX ADMIN — CEFEPIME 2000 MG: 2 INJECTION, POWDER, FOR SOLUTION INTRAVENOUS at 05:20

## 2020-11-27 RX ADMIN — Medication 5 MG/HR: at 08:46

## 2020-11-27 RX ADMIN — MINERAL OIL, PETROLATUM 1 APPLICATION.: 425; 573 OINTMENT OPHTHALMIC at 00:05

## 2020-11-27 RX ADMIN — INSULIN HUMAN 5 UNITS: 100 INJECTION, SOLUTION PARENTERAL at 17:33

## 2020-11-27 RX ADMIN — ACETAZOLAMIDE 250 MG: 500 INJECTION, POWDER, LYOPHILIZED, FOR SOLUTION INTRAVENOUS at 20:32

## 2020-11-27 RX ADMIN — POTASSIUM CHLORIDE 20 MEQ: 29.8 INJECTION, SOLUTION INTRAVENOUS at 16:14

## 2020-11-27 RX ADMIN — ALBUTEROL SULFATE 2.5 MG: 2.5 SOLUTION RESPIRATORY (INHALATION) at 04:35

## 2020-11-27 RX ADMIN — DOCUSATE SODIUM AND SENNOSIDES 1 TABLET: 8.6; 5 TABLET, FILM COATED ORAL at 20:28

## 2020-11-27 RX ADMIN — SODIUM CHLORIDE, PRESERVATIVE FREE 2 ML: 5 INJECTION INTRAVENOUS at 08:43

## 2020-11-27 RX ADMIN — QUETIAPINE 100 MG: 25 TABLET, FILM COATED ORAL at 08:43

## 2020-11-27 RX ADMIN — FENTANYL 1 PATCH: 75 PATCH TRANSDERMAL at 13:05

## 2020-11-27 RX ADMIN — Medication 8 MG/HR: at 23:16

## 2020-11-27 RX ADMIN — FUROSEMIDE 40 MG: 10 INJECTION, SOLUTION INTRAMUSCULAR; INTRAVENOUS at 08:42

## 2020-11-27 RX ADMIN — Medication 100 MCG/HR: at 01:10

## 2020-11-27 RX ADMIN — PROPOFOL 30 MCG/KG/MIN: 10 INJECTION, EMULSION INTRAVENOUS at 06:22

## 2020-11-27 RX ADMIN — INSULIN HUMAN 7 UNITS: 100 INJECTION, SOLUTION PARENTERAL at 05:20

## 2020-11-27 RX ADMIN — HYDROCORTISONE SODIUM SUCCINATE 50 MG: 100 INJECTION, POWDER, FOR SOLUTION INTRAMUSCULAR; INTRAVENOUS at 21:46

## 2020-11-27 RX ADMIN — MINERAL OIL, PETROLATUM 1 APPLICATION.: 425; 573 OINTMENT OPHTHALMIC at 03:50

## 2020-11-27 RX ADMIN — ALBUTEROL SULFATE 2.5 MG: 2.5 SOLUTION RESPIRATORY (INHALATION) at 00:14

## 2020-11-27 RX ADMIN — CLONAZEPAM 2 MG: 1 TABLET ORAL at 09:32

## 2020-11-27 RX ADMIN — CEFEPIME 2000 MG: 2 INJECTION, POWDER, FOR SOLUTION INTRAVENOUS at 13:05

## 2020-11-27 ASSESSMENT — PAIN SCALES - BEHAVIORAL PAIN SCALE (BPS)
BPS_SCORE: 5
BPS_SCORE: 4
BPS_SCORE: 5
BPS_SCORE: 4

## 2020-11-27 ASSESSMENT — PULMONARY FUNCTION TESTS
FEV1: 0
FEV1_PREDICTED: 0
FEV1: 0
FEV1/FVC: UNABLE TO OBTAIN, OR GREATER THAN 70%
FEV1: 0
FEV1_PREDICTED: 0

## 2020-11-27 ASSESSMENT — PATIENT HEALTH QUESTIONNAIRE - PHQ9: IS PATIENT ABLE TO COMPLETE PHQ2 OR PHQ9: NO, DEFER TO LATER TIME

## 2020-11-28 LAB
ANION GAP SERPL CALC-SCNC: 6 MMOL/L (ref 10–20)
APTT PPP: 46 SEC (ref 22–30)
BUN SERPL-MCNC: 42 MG/DL (ref 6–20)
BUN/CREAT SERPL: 65 (ref 7–25)
CALCIUM SERPL-MCNC: 8.4 MG/DL (ref 8.4–10.2)
CHLORIDE SERPL-SCNC: 110 MMOL/L (ref 98–107)
CO2 SERPL-SCNC: 32 MMOL/L (ref 21–32)
CREAT SERPL-MCNC: 0.64 MG/DL (ref 0.51–0.95)
CRP SERPL-MCNC: 1.2 MG/DL
ERYTHROCYTE [DISTWIDTH] IN BLOOD: 15.2 % (ref 11–15)
FERRITIN SERPL-MCNC: 696 NG/ML (ref 8–252)
GLUCOSE BLDC GLUCOMTR-MCNC: 115 MG/DL (ref 70–99)
GLUCOSE BLDC GLUCOMTR-MCNC: 193 MG/DL (ref 70–99)
GLUCOSE BLDC GLUCOMTR-MCNC: 195 MG/DL (ref 70–99)
GLUCOSE BLDC GLUCOMTR-MCNC: 225 MG/DL (ref 70–99)
GLUCOSE BLDC GLUCOMTR-MCNC: 227 MG/DL (ref 70–99)
GLUCOSE SERPL-MCNC: 132 MG/DL (ref 65–99)
HCT VFR BLD CALC: 31 % (ref 36–46.5)
HGB BLD-MCNC: 9.5 G/DL (ref 12–15.5)
MCH RBC QN AUTO: 29 PG (ref 26–34)
MCHC RBC AUTO-ENTMCNC: 30.6 G/DL (ref 32–36.5)
MCV RBC AUTO: 94.5 FL (ref 78–100)
NRBC BLD MANUAL-RTO: 0 /100 WBC
PLATELET # BLD: 238 K/MCL (ref 140–450)
POTASSIUM SERPL-SCNC: 3.8 MMOL/L (ref 3.4–5.1)
RBC # BLD: 3.28 MIL/MCL (ref 4–5.2)
SODIUM SERPL-SCNC: 144 MMOL/L (ref 135–145)
WBC # BLD: 5.7 K/MCL (ref 4.2–11)

## 2020-11-28 PROCEDURE — 10002803 HB RX 637: Performed by: INTERNAL MEDICINE

## 2020-11-28 PROCEDURE — 10002803 HB RX 637: Performed by: NURSE PRACTITIONER

## 2020-11-28 PROCEDURE — 94003 VENT MGMT INPAT SUBQ DAY: CPT

## 2020-11-28 PROCEDURE — 10002800 HB RX 250 W HCPCS: Performed by: INTERNAL MEDICINE

## 2020-11-28 PROCEDURE — 85730 THROMBOPLASTIN TIME PARTIAL: CPT

## 2020-11-28 PROCEDURE — 10003585 HB ROOM CHARGE INTERMEDIATE CARE

## 2020-11-28 PROCEDURE — 99233 SBSQ HOSP IP/OBS HIGH 50: CPT | Performed by: INTERNAL MEDICINE

## 2020-11-28 PROCEDURE — 94640 AIRWAY INHALATION TREATMENT: CPT

## 2020-11-28 PROCEDURE — 80048 BASIC METABOLIC PNL TOTAL CA: CPT

## 2020-11-28 PROCEDURE — 10004281 HB COUNTER-STAFF TIME PER 15 MIN

## 2020-11-28 PROCEDURE — 10002803 HB RX 637: Performed by: HOSPITALIST

## 2020-11-28 PROCEDURE — U0003 INFECTIOUS AGENT DETECTION BY NUCLEIC ACID (DNA OR RNA); SEVERE ACUTE RESPIRATORY SYNDROME CORONAVIRUS 2 (SARS-COV-2) (CORONAVIRUS DISEASE [COVID-19]), AMPLIFIED PROBE TECHNIQUE, MAKING USE OF HIGH THROUGHPUT TECHNOLOGIES AS DESCRIBED BY CMS-2020-01-R: HCPCS

## 2020-11-28 PROCEDURE — 10002807 HB RX 258: Performed by: INTERNAL MEDICINE

## 2020-11-28 PROCEDURE — 85027 COMPLETE CBC AUTOMATED: CPT

## 2020-11-28 PROCEDURE — 10002801 HB RX 250 W/O HCPCS: Performed by: INTERNAL MEDICINE

## 2020-11-28 PROCEDURE — 10004651 HB RX, NO CHARGE ITEM: Performed by: INTERNAL MEDICINE

## 2020-11-28 PROCEDURE — 86140 C-REACTIVE PROTEIN: CPT

## 2020-11-28 PROCEDURE — 82728 ASSAY OF FERRITIN: CPT

## 2020-11-28 RX ORDER — PREDNISONE 20 MG/1
40 TABLET ORAL
Status: COMPLETED | OUTPATIENT
Start: 2020-11-28 | End: 2020-11-28

## 2020-11-28 RX ORDER — PREDNISONE 20 MG/1
20 TABLET ORAL
Status: DISCONTINUED | OUTPATIENT
Start: 2020-11-29 | End: 2020-11-30

## 2020-11-28 RX ORDER — QUETIAPINE FUMARATE 200 MG/1
200 TABLET, FILM COATED ORAL EVERY 12 HOURS SCHEDULED
Status: DISCONTINUED | OUTPATIENT
Start: 2020-11-28 | End: 2020-11-30

## 2020-11-28 RX ORDER — CLONAZEPAM 1 MG/1
2 TABLET ORAL 3 TIMES DAILY
Status: DISCONTINUED | OUTPATIENT
Start: 2020-11-28 | End: 2020-11-30

## 2020-11-28 RX ORDER — POTASSIUM CHLORIDE 1.5 G/1.58G
40 POWDER, FOR SOLUTION ORAL ONCE
Status: COMPLETED | OUTPATIENT
Start: 2020-11-28 | End: 2020-11-28

## 2020-11-28 RX ORDER — AMOXICILLIN 250 MG
2 CAPSULE ORAL 2 TIMES DAILY
Status: DISCONTINUED | OUTPATIENT
Start: 2020-11-28 | End: 2020-12-02 | Stop reason: HOSPADM

## 2020-11-28 RX ADMIN — SODIUM CHLORIDE, PRESERVATIVE FREE 2 ML: 5 INJECTION INTRAVENOUS at 20:58

## 2020-11-28 RX ADMIN — QUETIAPINE 200 MG: 200 TABLET, FILM COATED ORAL at 10:10

## 2020-11-28 RX ADMIN — CLONAZEPAM 2 MG: 1 TABLET ORAL at 20:57

## 2020-11-28 RX ADMIN — PREDNISONE 40 MG: 20 TABLET ORAL at 12:53

## 2020-11-28 RX ADMIN — CHLORHEXIDINE GLUCONATE 15 ML: 1.2 RINSE ORAL at 20:55

## 2020-11-28 RX ADMIN — QUETIAPINE 200 MG: 200 TABLET, FILM COATED ORAL at 20:58

## 2020-11-28 RX ADMIN — Medication 8 MG/HR: at 13:00

## 2020-11-28 RX ADMIN — FUROSEMIDE 40 MG: 10 INJECTION, SOLUTION INTRAMUSCULAR; INTRAVENOUS at 09:54

## 2020-11-28 RX ADMIN — ALBUTEROL SULFATE 2.5 MG: 2.5 SOLUTION RESPIRATORY (INHALATION) at 20:49

## 2020-11-28 RX ADMIN — MINERAL OIL, PETROLATUM 1 APPLICATION.: 425; 573 OINTMENT OPHTHALMIC at 12:53

## 2020-11-28 RX ADMIN — ACETAZOLAMIDE 250 MG: 500 INJECTION, POWDER, LYOPHILIZED, FOR SOLUTION INTRAVENOUS at 09:54

## 2020-11-28 RX ADMIN — MINERAL OIL, PETROLATUM 1 APPLICATION.: 425; 573 OINTMENT OPHTHALMIC at 16:32

## 2020-11-28 RX ADMIN — DOCUSATE SODIUM AND SENNOSIDES 2 TABLET: 8.6; 5 TABLET ORAL at 20:57

## 2020-11-28 RX ADMIN — INSULIN HUMAN 5 UNITS: 100 INJECTION, SOLUTION PARENTERAL at 05:29

## 2020-11-28 RX ADMIN — POTASSIUM CHLORIDE 40 MEQ: 1.5 POWDER, FOR SOLUTION ORAL at 16:32

## 2020-11-28 RX ADMIN — PANTOPRAZOLE 40 MG: 40 TABLET, DELAYED RELEASE ORAL at 09:55

## 2020-11-28 RX ADMIN — MINERAL OIL, PETROLATUM 1 APPLICATION.: 425; 573 OINTMENT OPHTHALMIC at 19:39

## 2020-11-28 RX ADMIN — HEPARIN SODIUM 12 UNITS/KG/HR: 10000 INJECTION, SOLUTION INTRAVENOUS at 03:10

## 2020-11-28 RX ADMIN — ALBUTEROL SULFATE 2.5 MG: 2.5 SOLUTION RESPIRATORY (INHALATION) at 08:07

## 2020-11-28 RX ADMIN — INSULIN GLARGINE 24 UNITS: 100 INJECTION, SOLUTION SUBCUTANEOUS at 20:55

## 2020-11-28 RX ADMIN — CLONAZEPAM 2 MG: 1 TABLET ORAL at 09:54

## 2020-11-28 RX ADMIN — ALBUTEROL SULFATE 2.5 MG: 2.5 SOLUTION RESPIRATORY (INHALATION) at 14:41

## 2020-11-28 RX ADMIN — INSULIN HUMAN 9 UNITS: 100 INJECTION, SOLUTION PARENTERAL at 23:46

## 2020-11-28 RX ADMIN — POTASSIUM CHLORIDE 60 MEQ: 1.5 FOR SOLUTION ORAL at 00:29

## 2020-11-28 RX ADMIN — SODIUM CHLORIDE, PRESERVATIVE FREE 2 ML: 5 INJECTION INTRAVENOUS at 09:55

## 2020-11-28 RX ADMIN — Medication 125 MCG/HR: at 02:03

## 2020-11-28 RX ADMIN — HYDROCORTISONE SODIUM SUCCINATE 50 MG: 100 INJECTION, POWDER, FOR SOLUTION INTRAMUSCULAR; INTRAVENOUS at 05:27

## 2020-11-28 RX ADMIN — CLONAZEPAM 2 MG: 1 TABLET ORAL at 13:55

## 2020-11-28 RX ADMIN — MINERAL OIL, PETROLATUM 1 APPLICATION.: 425; 573 OINTMENT OPHTHALMIC at 09:55

## 2020-11-28 RX ADMIN — INSULIN HUMAN 7 UNITS: 100 INJECTION, SOLUTION PARENTERAL at 16:49

## 2020-11-28 RX ADMIN — ACETAZOLAMIDE 250 MG: 500 INJECTION, POWDER, LYOPHILIZED, FOR SOLUTION INTRAVENOUS at 20:59

## 2020-11-28 RX ADMIN — CHLORHEXIDINE GLUCONATE 15 ML: 1.2 RINSE ORAL at 09:55

## 2020-11-28 RX ADMIN — MINERAL OIL, PETROLATUM 1 APPLICATION.: 425; 573 OINTMENT OPHTHALMIC at 23:47

## 2020-11-28 RX ADMIN — MINERAL OIL, PETROLATUM 1 APPLICATION.: 425; 573 OINTMENT OPHTHALMIC at 04:00

## 2020-11-28 RX ADMIN — DOCUSATE SODIUM AND SENNOSIDES 2 TABLET: 8.6; 5 TABLET ORAL at 09:54

## 2020-11-28 RX ADMIN — INSULIN HUMAN 7 UNITS: 100 INJECTION, SOLUTION PARENTERAL at 12:58

## 2020-11-28 ASSESSMENT — PAIN SCALES - BEHAVIORAL PAIN SCALE (BPS)
BPS_SCORE: 5
BPS_SCORE: 5
BPS_SCORE: 3
BPS_SCORE: 4
BPS_SCORE: 4
BPS_SCORE: 5
BPS_SCORE: 5

## 2020-11-28 ASSESSMENT — PATIENT HEALTH QUESTIONNAIRE - PHQ9: IS PATIENT ABLE TO COMPLETE PHQ2 OR PHQ9: NO, DEFER TO LATER TIME

## 2020-11-29 LAB
ANION GAP SERPL CALC-SCNC: 6 MMOL/L (ref 10–20)
APTT PPP: 45 SEC (ref 22–30)
BASE EXCESS BLDA CALC-SCNC: 4 MMOL/L (ref 0–3)
BDY SITE: ABNORMAL
BODY TEMPERATURE: 36.5 DEGREES
BUN SERPL-MCNC: 47 MG/DL (ref 6–20)
BUN/CREAT SERPL: 73 (ref 7–25)
CALCIUM SERPL-MCNC: 8.4 MG/DL (ref 8.4–10.2)
CHLORIDE SERPL-SCNC: 112 MMOL/L (ref 98–107)
CO2 SERPL-SCNC: 29 MMOL/L (ref 21–32)
CONDITION-RC: ABNORMAL
CREAT SERPL-MCNC: 0.64 MG/DL (ref 0.51–0.95)
D DIMER PPP FEU-MCNC: 0.97 MG/L (FEU)
ERYTHROCYTE [DISTWIDTH] IN BLOOD: 15.2 % (ref 11–15)
GLUCOSE BLDC GLUCOMTR-MCNC: 119 MG/DL (ref 70–99)
GLUCOSE BLDC GLUCOMTR-MCNC: 134 MG/DL (ref 70–99)
GLUCOSE BLDC GLUCOMTR-MCNC: 205 MG/DL (ref 70–99)
GLUCOSE BLDC GLUCOMTR-MCNC: 211 MG/DL (ref 70–99)
GLUCOSE BLDC GLUCOMTR-MCNC: 214 MG/DL (ref 70–99)
GLUCOSE SERPL-MCNC: 155 MG/DL (ref 65–99)
HCO3 BLDA-SCNC: 29 MMOL/L (ref 22–28)
HCT VFR BLD CALC: 33.4 % (ref 36–46.5)
HGB BLD-MCNC: 10.7 G/DL (ref 12–15.5)
HOROWITZ INDEX BLD+IHG-RTO: 40 %
HOROWITZ INDEX BLDA+IHG-RTO: 158 MM[HG] (ref 300–500)
MCH RBC QN AUTO: 29.8 PG (ref 26–34)
MCHC RBC AUTO-ENTMCNC: 32 G/DL (ref 32–36.5)
MCV RBC AUTO: 93 FL (ref 78–100)
NRBC BLD MANUAL-RTO: 0 /100 WBC
PCO2 BLDA: 40 MM HG (ref 32–45)
PH BLDA: 7.46 UNITS (ref 7.35–7.45)
PHOSPHATE SERPL-MCNC: 2.7 MG/DL (ref 2.4–4.7)
PLATELET # BLD: 280 K/MCL (ref 140–450)
PO2 BLDA: 61 MM HG (ref 83–108)
POTASSIUM SERPL-SCNC: 3.4 MMOL/L (ref 3.4–5.1)
POTASSIUM SERPL-SCNC: 4.1 MMOL/L (ref 3.4–5.1)
RBC # BLD: 3.59 MIL/MCL (ref 4–5.2)
SAO2 % BLDA: 93 % (ref 95–99)
SARS-COV-2 RNA RESP QL NAA+PROBE: DETECTED
SODIUM SERPL-SCNC: 144 MMOL/L (ref 135–145)
SPECIMEN SOURCE: ABNORMAL
WBC # BLD: 6.2 K/MCL (ref 4.2–11)

## 2020-11-29 PROCEDURE — 94640 AIRWAY INHALATION TREATMENT: CPT

## 2020-11-29 PROCEDURE — 99233 SBSQ HOSP IP/OBS HIGH 50: CPT | Performed by: INTERNAL MEDICINE

## 2020-11-29 PROCEDURE — 10002803 HB RX 637: Performed by: INTERNAL MEDICINE

## 2020-11-29 PROCEDURE — 10002800 HB RX 250 W HCPCS: Performed by: INTERNAL MEDICINE

## 2020-11-29 PROCEDURE — 10002803 HB RX 637: Performed by: HOSPITALIST

## 2020-11-29 PROCEDURE — 10002801 HB RX 250 W/O HCPCS: Performed by: INTERNAL MEDICINE

## 2020-11-29 PROCEDURE — 85379 FIBRIN DEGRADATION QUANT: CPT

## 2020-11-29 PROCEDURE — 84132 ASSAY OF SERUM POTASSIUM: CPT

## 2020-11-29 PROCEDURE — 99255 IP/OBS CONSLTJ NEW/EST HI 80: CPT | Performed by: SURGERY

## 2020-11-29 PROCEDURE — 10003585 HB ROOM CHARGE INTERMEDIATE CARE

## 2020-11-29 PROCEDURE — 85027 COMPLETE CBC AUTOMATED: CPT

## 2020-11-29 PROCEDURE — 85730 THROMBOPLASTIN TIME PARTIAL: CPT

## 2020-11-29 PROCEDURE — 10004651 HB RX, NO CHARGE ITEM: Performed by: INTERNAL MEDICINE

## 2020-11-29 PROCEDURE — 10004281 HB COUNTER-STAFF TIME PER 15 MIN

## 2020-11-29 PROCEDURE — 94003 VENT MGMT INPAT SUBQ DAY: CPT

## 2020-11-29 PROCEDURE — 10002803 HB RX 637: Performed by: NURSE PRACTITIONER

## 2020-11-29 PROCEDURE — 84100 ASSAY OF PHOSPHORUS: CPT

## 2020-11-29 PROCEDURE — 82805 BLOOD GASES W/O2 SATURATION: CPT

## 2020-11-29 PROCEDURE — 80048 BASIC METABOLIC PNL TOTAL CA: CPT

## 2020-11-29 RX ORDER — POTASSIUM CHLORIDE 1.5 G/1.58G
40 POWDER, FOR SOLUTION ORAL ONCE
Status: COMPLETED | OUTPATIENT
Start: 2020-11-29 | End: 2020-11-29

## 2020-11-29 RX ORDER — FUROSEMIDE 10 MG/ML
20 INJECTION INTRAMUSCULAR; INTRAVENOUS DAILY
Status: DISCONTINUED | OUTPATIENT
Start: 2020-11-29 | End: 2020-12-02

## 2020-11-29 RX ADMIN — POTASSIUM CHLORIDE 40 MEQ: 1.5 POWDER, FOR SOLUTION ORAL at 08:36

## 2020-11-29 RX ADMIN — CLONAZEPAM 2 MG: 1 TABLET ORAL at 13:03

## 2020-11-29 RX ADMIN — MINERAL OIL, PETROLATUM 1 APPLICATION.: 425; 573 OINTMENT OPHTHALMIC at 23:14

## 2020-11-29 RX ADMIN — SODIUM CHLORIDE, PRESERVATIVE FREE 2 ML: 5 INJECTION INTRAVENOUS at 20:09

## 2020-11-29 RX ADMIN — PREDNISONE 20 MG: 20 TABLET ORAL at 08:40

## 2020-11-29 RX ADMIN — MINERAL OIL, PETROLATUM 1 APPLICATION.: 425; 573 OINTMENT OPHTHALMIC at 20:10

## 2020-11-29 RX ADMIN — SODIUM CHLORIDE, PRESERVATIVE FREE 2 ML: 5 INJECTION INTRAVENOUS at 08:37

## 2020-11-29 RX ADMIN — QUETIAPINE 200 MG: 200 TABLET, FILM COATED ORAL at 20:09

## 2020-11-29 RX ADMIN — PANTOPRAZOLE 40 MG: 40 TABLET, DELAYED RELEASE ORAL at 08:36

## 2020-11-29 RX ADMIN — LABETALOL 20 MG/4 ML (5 MG/ML) INTRAVENOUS SYRINGE 10 MG: at 09:12

## 2020-11-29 RX ADMIN — INSULIN GLARGINE 24 UNITS: 100 INJECTION, SOLUTION SUBCUTANEOUS at 21:55

## 2020-11-29 RX ADMIN — POLYETHYLENE GLYCOL (3350) 17 G: 17 POWDER, FOR SOLUTION ORAL at 08:40

## 2020-11-29 RX ADMIN — INSULIN HUMAN 9 UNITS: 100 INJECTION, SOLUTION PARENTERAL at 17:10

## 2020-11-29 RX ADMIN — CLONAZEPAM 2 MG: 1 TABLET ORAL at 08:36

## 2020-11-29 RX ADMIN — QUETIAPINE 200 MG: 200 TABLET, FILM COATED ORAL at 08:36

## 2020-11-29 RX ADMIN — MINERAL OIL, PETROLATUM 1 APPLICATION.: 425; 573 OINTMENT OPHTHALMIC at 17:10

## 2020-11-29 RX ADMIN — MINERAL OIL, PETROLATUM 1 APPLICATION.: 425; 573 OINTMENT OPHTHALMIC at 08:36

## 2020-11-29 RX ADMIN — FUROSEMIDE 20 MG: 10 INJECTION, SOLUTION INTRAMUSCULAR; INTRAVENOUS at 08:36

## 2020-11-29 RX ADMIN — DOCUSATE SODIUM AND SENNOSIDES 2 TABLET: 8.6; 5 TABLET ORAL at 08:36

## 2020-11-29 RX ADMIN — Medication 6 MG/HR: at 21:54

## 2020-11-29 RX ADMIN — ALBUTEROL SULFATE 2.5 MG: 2.5 SOLUTION RESPIRATORY (INHALATION) at 20:04

## 2020-11-29 RX ADMIN — CHLORHEXIDINE GLUCONATE 15 ML: 1.2 RINSE ORAL at 20:09

## 2020-11-29 RX ADMIN — INSULIN HUMAN 9 UNITS: 100 INJECTION, SOLUTION PARENTERAL at 12:56

## 2020-11-29 RX ADMIN — CHLORHEXIDINE GLUCONATE 15 ML: 1.2 RINSE ORAL at 08:36

## 2020-11-29 RX ADMIN — DOCUSATE SODIUM AND SENNOSIDES 2 TABLET: 8.6; 5 TABLET ORAL at 20:09

## 2020-11-29 RX ADMIN — ALBUTEROL SULFATE 2.5 MG: 2.5 SOLUTION RESPIRATORY (INHALATION) at 13:28

## 2020-11-29 RX ADMIN — ALBUTEROL SULFATE 2.5 MG: 2.5 SOLUTION RESPIRATORY (INHALATION) at 07:56

## 2020-11-29 RX ADMIN — Medication 6 MG/HR: at 06:05

## 2020-11-29 RX ADMIN — MINERAL OIL, PETROLATUM 1 APPLICATION.: 425; 573 OINTMENT OPHTHALMIC at 04:00

## 2020-11-29 RX ADMIN — CLONAZEPAM 2 MG: 1 TABLET ORAL at 20:09

## 2020-11-29 RX ADMIN — MINERAL OIL, PETROLATUM 1 APPLICATION.: 425; 573 OINTMENT OPHTHALMIC at 12:56

## 2020-11-29 RX ADMIN — INSULIN HUMAN 5 UNITS: 100 INJECTION, SOLUTION PARENTERAL at 06:02

## 2020-11-29 ASSESSMENT — PAIN SCALES - BEHAVIORAL PAIN SCALE (BPS)
BPS_SCORE: 4
BPS_SCORE: 3
BPS_SCORE: 4

## 2020-11-30 ENCOUNTER — APPOINTMENT (OUTPATIENT)
Dept: GENERAL RADIOLOGY | Age: 55
DRG: 004 | End: 2020-11-30
Attending: SURGERY

## 2020-11-30 ENCOUNTER — ANESTHESIA (OUTPATIENT)
Dept: SURGERY | Age: 55
DRG: 004 | End: 2020-11-30

## 2020-11-30 ENCOUNTER — ANESTHESIA EVENT (OUTPATIENT)
Dept: SURGERY | Age: 55
DRG: 004 | End: 2020-11-30

## 2020-11-30 LAB
ANION GAP SERPL CALC-SCNC: 7 MMOL/L (ref 10–20)
APTT PPP: 41 SEC (ref 22–30)
BUN SERPL-MCNC: 50 MG/DL (ref 6–20)
BUN/CREAT SERPL: 83 (ref 7–25)
CALCIUM SERPL-MCNC: 8.5 MG/DL (ref 8.4–10.2)
CHLORIDE SERPL-SCNC: 113 MMOL/L (ref 98–107)
CO2 SERPL-SCNC: 29 MMOL/L (ref 21–32)
CREAT SERPL-MCNC: 0.6 MG/DL (ref 0.51–0.95)
ERYTHROCYTE [DISTWIDTH] IN BLOOD: 15.5 % (ref 11–15)
GLUCOSE BLDC GLUCOMTR-MCNC: 144 MG/DL (ref 70–99)
GLUCOSE BLDC GLUCOMTR-MCNC: 202 MG/DL (ref 70–99)
GLUCOSE SERPL-MCNC: 147 MG/DL (ref 65–99)
HCT VFR BLD CALC: 33.8 % (ref 36–46.5)
HGB BLD-MCNC: 10.4 G/DL (ref 12–15.5)
MAGNESIUM SERPL-MCNC: 2.5 MG/DL (ref 1.7–2.4)
MCH RBC QN AUTO: 28.8 PG (ref 26–34)
MCHC RBC AUTO-ENTMCNC: 30.8 G/DL (ref 32–36.5)
MCV RBC AUTO: 93.6 FL (ref 78–100)
NRBC BLD MANUAL-RTO: 0 /100 WBC
PHOSPHATE SERPL-MCNC: 2.8 MG/DL (ref 2.4–4.7)
PLATELET # BLD: 269 K/MCL (ref 140–450)
POTASSIUM SERPL-SCNC: 3.7 MMOL/L (ref 3.4–5.1)
RBC # BLD: 3.61 MIL/MCL (ref 4–5.2)
SODIUM SERPL-SCNC: 145 MMOL/L (ref 135–145)
WBC # BLD: 8.6 K/MCL (ref 4.2–11)

## 2020-11-30 PROCEDURE — 84100 ASSAY OF PHOSPHORUS: CPT

## 2020-11-30 PROCEDURE — 10002803 HB RX 637: Performed by: INTERNAL MEDICINE

## 2020-11-30 PROCEDURE — 10002803 HB RX 637: Performed by: NURSE PRACTITIONER

## 2020-11-30 PROCEDURE — 10002801 HB RX 250 W/O HCPCS: Performed by: INTERNAL MEDICINE

## 2020-11-30 PROCEDURE — 93005 ELECTROCARDIOGRAM TRACING: CPT | Performed by: INTERNAL MEDICINE

## 2020-11-30 PROCEDURE — 85027 COMPLETE CBC AUTOMATED: CPT

## 2020-11-30 PROCEDURE — 71045 X-RAY EXAM CHEST 1 VIEW: CPT

## 2020-11-30 PROCEDURE — 10002807 HB RX 258: Performed by: ANESTHESIOLOGY

## 2020-11-30 PROCEDURE — 10002800 HB RX 250 W HCPCS: Performed by: ANESTHESIOLOGY

## 2020-11-30 PROCEDURE — 10002800 HB RX 250 W HCPCS: Performed by: INTERNAL MEDICINE

## 2020-11-30 PROCEDURE — 10002801 HB RX 250 W/O HCPCS: Performed by: ANESTHESIOLOGY

## 2020-11-30 PROCEDURE — 10004281 HB COUNTER-STAFF TIME PER 15 MIN

## 2020-11-30 PROCEDURE — 99233 SBSQ HOSP IP/OBS HIGH 50: CPT | Performed by: SURGERY

## 2020-11-30 PROCEDURE — 0B113F4 BYPASS TRACHEA TO CUTANEOUS WITH TRACHEOSTOMY DEVICE, PERCUTANEOUS APPROACH: ICD-10-PCS | Performed by: SURGERY

## 2020-11-30 PROCEDURE — 31600 PLANNED TRACHEOSTOMY: CPT | Performed by: SURGERY

## 2020-11-30 PROCEDURE — 10003585 HB ROOM CHARGE INTERMEDIATE CARE

## 2020-11-30 PROCEDURE — 80048 BASIC METABOLIC PNL TOTAL CA: CPT

## 2020-11-30 PROCEDURE — 10004651 HB RX, NO CHARGE ITEM: Performed by: INTERNAL MEDICINE

## 2020-11-30 PROCEDURE — 10002019 HB COUNTER RESP ASSESSMENT

## 2020-11-30 PROCEDURE — 99233 SBSQ HOSP IP/OBS HIGH 50: CPT | Performed by: INTERNAL MEDICINE

## 2020-11-30 PROCEDURE — 0BJ08ZZ INSPECTION OF TRACHEOBRONCHIAL TREE, VIA NATURAL OR ARTIFICIAL OPENING ENDOSCOPIC: ICD-10-PCS | Performed by: SURGERY

## 2020-11-30 PROCEDURE — 10002800 HB RX 250 W HCPCS: Performed by: SURGERY

## 2020-11-30 PROCEDURE — 87281 PNEUMOCYSTIS CARINII AG IF: CPT

## 2020-11-30 PROCEDURE — 94003 VENT MGMT INPAT SUBQ DAY: CPT

## 2020-11-30 PROCEDURE — 83735 ASSAY OF MAGNESIUM: CPT

## 2020-11-30 PROCEDURE — 0DH63UZ INSERTION OF FEEDING DEVICE INTO STOMACH, PERCUTANEOUS APPROACH: ICD-10-PCS | Performed by: SURGERY

## 2020-11-30 PROCEDURE — 94640 AIRWAY INHALATION TREATMENT: CPT

## 2020-11-30 PROCEDURE — 43246 EGD PLACE GASTROSTOMY TUBE: CPT | Performed by: SURGERY

## 2020-11-30 PROCEDURE — 85730 THROMBOPLASTIN TIME PARTIAL: CPT

## 2020-11-30 RX ORDER — ROCURONIUM BROMIDE 10 MG/ML
INJECTION, SOLUTION INTRAVENOUS PRN
Status: DISCONTINUED | OUTPATIENT
Start: 2020-11-30 | End: 2020-11-30

## 2020-11-30 RX ORDER — PREDNISONE 20 MG/1
20 TABLET ORAL
Status: COMPLETED | OUTPATIENT
Start: 2020-12-01 | End: 2020-12-01

## 2020-11-30 RX ORDER — POTASSIUM CHLORIDE 1.5 G/1.58G
40 POWDER, FOR SOLUTION ORAL ONCE
Status: CANCELLED | OUTPATIENT
Start: 2020-11-30 | End: 2020-11-30

## 2020-11-30 RX ORDER — CLONAZEPAM 1 MG/1
1 TABLET ORAL EVERY 12 HOURS SCHEDULED
Status: DISCONTINUED | OUTPATIENT
Start: 2020-12-01 | End: 2020-12-02 | Stop reason: HOSPADM

## 2020-11-30 RX ORDER — SODIUM CHLORIDE 9 MG/ML
INJECTION, SOLUTION INTRAVENOUS CONTINUOUS PRN
Status: DISCONTINUED | OUTPATIENT
Start: 2020-11-30 | End: 2020-11-30

## 2020-11-30 RX ORDER — MIDAZOLAM HYDROCHLORIDE 1 MG/ML
INJECTION, SOLUTION INTRAMUSCULAR; INTRAVENOUS PRN
Status: DISCONTINUED | OUTPATIENT
Start: 2020-11-30 | End: 2020-11-30

## 2020-11-30 RX ORDER — SULFAMETHOXAZOLE AND TRIMETHOPRIM 200; 40 MG/5ML; MG/5ML
20 SUSPENSION ORAL
Status: DISCONTINUED | OUTPATIENT
Start: 2020-11-30 | End: 2020-12-02 | Stop reason: HOSPADM

## 2020-11-30 RX ORDER — ACYCLOVIR 200 MG/5ML
400 SUSPENSION ORAL EVERY 12 HOURS
Status: DISCONTINUED | OUTPATIENT
Start: 2020-11-30 | End: 2020-12-02 | Stop reason: HOSPADM

## 2020-11-30 RX ORDER — SULFAMETHOXAZOLE AND TRIMETHOPRIM 200; 40 MG/5ML; MG/5ML
20 SUSPENSION ORAL
Status: DISCONTINUED | OUTPATIENT
Start: 2020-11-30 | End: 2020-11-30

## 2020-11-30 RX ORDER — POTASSIUM CHLORIDE 1.5 G/1.58G
40 POWDER, FOR SOLUTION ORAL ONCE
Status: COMPLETED | OUTPATIENT
Start: 2020-11-30 | End: 2020-11-30

## 2020-11-30 RX ORDER — PREDNISONE 20 MG/1
10 TABLET ORAL
Status: DISCONTINUED | OUTPATIENT
Start: 2020-12-02 | End: 2020-12-02 | Stop reason: HOSPADM

## 2020-11-30 RX ADMIN — DOCUSATE SODIUM AND SENNOSIDES 2 TABLET: 8.6; 5 TABLET ORAL at 08:03

## 2020-11-30 RX ADMIN — SODIUM CHLORIDE, PRESERVATIVE FREE 2 ML: 5 INJECTION INTRAVENOUS at 08:04

## 2020-11-30 RX ADMIN — MIDAZOLAM HYDROCHLORIDE 2 MG: 1 INJECTION, SOLUTION INTRAMUSCULAR; INTRAVENOUS at 14:51

## 2020-11-30 RX ADMIN — FENTANYL CITRATE 100 MCG: 50 INJECTION, SOLUTION INTRAMUSCULAR; INTRAVENOUS at 14:51

## 2020-11-30 RX ADMIN — ROCURONIUM BROMIDE 30 MG: 50 INJECTION, SOLUTION INTRAVENOUS at 14:51

## 2020-11-30 RX ADMIN — HEPARIN SODIUM 12 UNITS/KG/HR: 10000 INJECTION, SOLUTION INTRAVENOUS at 01:35

## 2020-11-30 RX ADMIN — SODIUM CHLORIDE, PRESERVATIVE FREE 2 ML: 5 INJECTION INTRAVENOUS at 22:07

## 2020-11-30 RX ADMIN — FUROSEMIDE 20 MG: 10 INJECTION, SOLUTION INTRAMUSCULAR; INTRAVENOUS at 08:04

## 2020-11-30 RX ADMIN — SODIUM CHLORIDE: 9 INJECTION, SOLUTION INTRAVENOUS at 14:40

## 2020-11-30 RX ADMIN — INSULIN HUMAN 9 UNITS: 100 INJECTION, SOLUTION PARENTERAL at 17:48

## 2020-11-30 RX ADMIN — FENTANYL 1 PATCH: 75 PATCH TRANSDERMAL at 08:28

## 2020-11-30 RX ADMIN — POTASSIUM CHLORIDE 40 MEQ: 1.5 POWDER, FOR SOLUTION ORAL at 08:28

## 2020-11-30 RX ADMIN — ALBUTEROL SULFATE 2.5 MG: 2.5 SOLUTION RESPIRATORY (INHALATION) at 08:10

## 2020-11-30 RX ADMIN — MINERAL OIL, PETROLATUM 1 APPLICATION.: 425; 573 OINTMENT OPHTHALMIC at 08:04

## 2020-11-30 RX ADMIN — CHLORHEXIDINE GLUCONATE 15 ML: 1.2 RINSE ORAL at 08:03

## 2020-11-30 RX ADMIN — ROCURONIUM BROMIDE 20 MG: 50 INJECTION, SOLUTION INTRAVENOUS at 15:05

## 2020-11-30 RX ADMIN — Medication 6 MG/HR: at 08:19

## 2020-11-30 RX ADMIN — CLONAZEPAM 2 MG: 1 TABLET ORAL at 15:49

## 2020-11-30 RX ADMIN — CHLORHEXIDINE GLUCONATE 15 ML: 1.2 RINSE ORAL at 19:55

## 2020-11-30 RX ADMIN — CEFAZOLIN SODIUM 2000 MG: 300 INJECTION, POWDER, LYOPHILIZED, FOR SOLUTION INTRAVENOUS at 15:05

## 2020-11-30 RX ADMIN — ALBUTEROL SULFATE 2.5 MG: 2.5 SOLUTION RESPIRATORY (INHALATION) at 14:08

## 2020-11-30 RX ADMIN — MINERAL OIL, PETROLATUM 1 APPLICATION.: 425; 573 OINTMENT OPHTHALMIC at 04:50

## 2020-11-30 RX ADMIN — MINERAL OIL, PETROLATUM 1 APPLICATION.: 425; 573 OINTMENT OPHTHALMIC at 12:20

## 2020-11-30 RX ADMIN — CLONAZEPAM 2 MG: 1 TABLET ORAL at 08:03

## 2020-11-30 RX ADMIN — ALBUTEROL SULFATE 2.5 MG: 2.5 SOLUTION RESPIRATORY (INHALATION) at 20:22

## 2020-11-30 RX ADMIN — MINERAL OIL, PETROLATUM 1 APPLICATION.: 425; 573 OINTMENT OPHTHALMIC at 19:54

## 2020-11-30 RX ADMIN — SULFAMETHOXAZOLE AND TRIMETHOPRIM 20 ML: 200; 40 SUSPENSION ORAL at 17:48

## 2020-11-30 RX ADMIN — ACYCLOVIR 400 MG: 200 SUSPENSION ORAL at 17:48

## 2020-11-30 RX ADMIN — QUETIAPINE 200 MG: 200 TABLET, FILM COATED ORAL at 08:03

## 2020-11-30 RX ADMIN — PANTOPRAZOLE 40 MG: 40 TABLET, DELAYED RELEASE ORAL at 04:53

## 2020-11-30 RX ADMIN — PREDNISONE 20 MG: 20 TABLET ORAL at 08:03

## 2020-11-30 RX ADMIN — MINERAL OIL, PETROLATUM 1 APPLICATION.: 425; 573 OINTMENT OPHTHALMIC at 15:50

## 2020-11-30 RX ADMIN — INSULIN GLARGINE 24 UNITS: 100 INJECTION, SOLUTION SUBCUTANEOUS at 19:55

## 2020-11-30 RX ADMIN — DOCUSATE SODIUM AND SENNOSIDES 2 TABLET: 8.6; 5 TABLET ORAL at 19:55

## 2020-11-30 ASSESSMENT — PULMONARY FUNCTION TESTS
FEV1_PREDICTED: 0
FEV1_PREDICTED: 0
FEV1/FVC: UNABLE TO OBTAIN, OR GREATER THAN 70%
FEV1: 0

## 2020-11-30 ASSESSMENT — PAIN SCALES - BEHAVIORAL PAIN SCALE (BPS)
BPS_SCORE: 5
BPS_SCORE: 4

## 2020-12-01 LAB
ANION GAP SERPL CALC-SCNC: 8 MMOL/L (ref 10–20)
APTT PPP: 36 SEC (ref 22–30)
APTT PPP: 38 SEC (ref 22–30)
ATRIAL RATE (BPM): 105
BUN SERPL-MCNC: 44 MG/DL (ref 6–20)
BUN/CREAT SERPL: 68 (ref 7–25)
CALCIUM SERPL-MCNC: 8.6 MG/DL (ref 8.4–10.2)
CHLORIDE SERPL-SCNC: 110 MMOL/L (ref 98–107)
CO2 SERPL-SCNC: 32 MMOL/L (ref 21–32)
CREAT SERPL-MCNC: 0.65 MG/DL (ref 0.51–0.95)
D DIMER PPP FEU-MCNC: 1.44 MG/L (FEU)
ERYTHROCYTE [DISTWIDTH] IN BLOOD: 15.4 % (ref 11–15)
GLUCOSE BLDC GLUCOMTR-MCNC: 141 MG/DL (ref 70–99)
GLUCOSE BLDC GLUCOMTR-MCNC: 173 MG/DL (ref 70–99)
GLUCOSE BLDC GLUCOMTR-MCNC: 207 MG/DL (ref 70–99)
GLUCOSE BLDC GLUCOMTR-MCNC: 70 MG/DL (ref 70–99)
GLUCOSE SERPL-MCNC: 105 MG/DL (ref 65–99)
HCT VFR BLD CALC: 34.1 % (ref 36–46.5)
HGB BLD-MCNC: 10.6 G/DL (ref 12–15.5)
MCH RBC QN AUTO: 29.3 PG (ref 26–34)
MCHC RBC AUTO-ENTMCNC: 31.1 G/DL (ref 32–36.5)
MCV RBC AUTO: 94.2 FL (ref 78–100)
NRBC BLD MANUAL-RTO: 0 /100 WBC
P AXIS (DEGREES): 31
P JIROVECII AG SPEC QL IF: NORMAL
PLATELET # BLD: 275 K/MCL (ref 140–450)
POTASSIUM SERPL-SCNC: 3.8 MMOL/L (ref 3.4–5.1)
PR-INTERVAL (MSEC): 152
QRS-INTERVAL (MSEC): 56
QT-INTERVAL (MSEC): 342
QTC: 452
R AXIS (DEGREES): 6
RBC # BLD: 3.62 MIL/MCL (ref 4–5.2)
REPORT STATUS (RPT): NORMAL
REPORT TEXT: NORMAL
SODIUM SERPL-SCNC: 146 MMOL/L (ref 135–145)
SPECIMEN SOURCE: NORMAL
T AXIS (DEGREES): 55
VENTRICULAR RATE EKG/MIN (BPM): 105
WBC # BLD: 10.1 K/MCL (ref 4.2–11)

## 2020-12-01 PROCEDURE — 10004651 HB RX, NO CHARGE ITEM: Performed by: INTERNAL MEDICINE

## 2020-12-01 PROCEDURE — 10002800 HB RX 250 W HCPCS: Performed by: INTERNAL MEDICINE

## 2020-12-01 PROCEDURE — 99233 SBSQ HOSP IP/OBS HIGH 50: CPT | Performed by: INTERNAL MEDICINE

## 2020-12-01 PROCEDURE — 10004281 HB COUNTER-STAFF TIME PER 15 MIN

## 2020-12-01 PROCEDURE — 10002803 HB RX 637: Performed by: INTERNAL MEDICINE

## 2020-12-01 PROCEDURE — 10002801 HB RX 250 W/O HCPCS: Performed by: INTERNAL MEDICINE

## 2020-12-01 PROCEDURE — 10003585 HB ROOM CHARGE INTERMEDIATE CARE

## 2020-12-01 PROCEDURE — 94640 AIRWAY INHALATION TREATMENT: CPT

## 2020-12-01 PROCEDURE — 86480 TB TEST CELL IMMUN MEASURE: CPT

## 2020-12-01 PROCEDURE — 85730 THROMBOPLASTIN TIME PARTIAL: CPT

## 2020-12-01 PROCEDURE — 85027 COMPLETE CBC AUTOMATED: CPT

## 2020-12-01 PROCEDURE — 99231 SBSQ HOSP IP/OBS SF/LOW 25: CPT

## 2020-12-01 PROCEDURE — 80048 BASIC METABOLIC PNL TOTAL CA: CPT

## 2020-12-01 PROCEDURE — 94003 VENT MGMT INPAT SUBQ DAY: CPT

## 2020-12-01 PROCEDURE — 85379 FIBRIN DEGRADATION QUANT: CPT

## 2020-12-01 RX ORDER — CODEINE SULFATE 30 MG/1
30 TABLET ORAL EVERY 4 HOURS PRN
Status: DISCONTINUED | OUTPATIENT
Start: 2020-12-01 | End: 2020-12-01

## 2020-12-01 RX ORDER — CLONIDINE HYDROCHLORIDE 0.1 MG/1
0.1 TABLET ORAL EVERY 12 HOURS SCHEDULED
Status: DISCONTINUED | OUTPATIENT
Start: 2020-12-01 | End: 2020-12-02 | Stop reason: HOSPADM

## 2020-12-01 RX ORDER — POTASSIUM CHLORIDE 14.9 MG/ML
20 INJECTION INTRAVENOUS ONCE
Status: COMPLETED | OUTPATIENT
Start: 2020-12-01 | End: 2020-12-01

## 2020-12-01 RX ORDER — HYDROCODONE BITARTRATE AND ACETAMINOPHEN 5; 325 MG/1; MG/1
1 TABLET ORAL EVERY 4 HOURS PRN
Status: DISCONTINUED | OUTPATIENT
Start: 2020-12-01 | End: 2020-12-02 | Stop reason: HOSPADM

## 2020-12-01 RX ORDER — CODEINE SULFATE 30 MG/1
30 TABLET ORAL EVERY 4 HOURS PRN
Status: DISCONTINUED | OUTPATIENT
Start: 2020-12-01 | End: 2020-12-02

## 2020-12-01 RX ORDER — ACETAMINOPHEN 160 MG/5ML
650 LIQUID ORAL EVERY 6 HOURS PRN
Status: DISCONTINUED | OUTPATIENT
Start: 2020-12-01 | End: 2020-12-02 | Stop reason: HOSPADM

## 2020-12-01 RX ADMIN — ACYCLOVIR 400 MG: 200 SUSPENSION ORAL at 17:48

## 2020-12-01 RX ADMIN — INSULIN HUMAN 4 UNITS: 100 INJECTION, SOLUTION PARENTERAL at 17:50

## 2020-12-01 RX ADMIN — PANTOPRAZOLE 40 MG: 40 TABLET, DELAYED RELEASE ORAL at 08:05

## 2020-12-01 RX ADMIN — MINERAL OIL, PETROLATUM 1 APPLICATION.: 425; 573 OINTMENT OPHTHALMIC at 16:32

## 2020-12-01 RX ADMIN — MINERAL OIL, PETROLATUM 1 APPLICATION.: 425; 573 OINTMENT OPHTHALMIC at 00:05

## 2020-12-01 RX ADMIN — SODIUM CHLORIDE, PRESERVATIVE FREE 2 ML: 5 INJECTION INTRAVENOUS at 19:52

## 2020-12-01 RX ADMIN — MINERAL OIL, PETROLATUM 1 APPLICATION.: 425; 573 OINTMENT OPHTHALMIC at 19:54

## 2020-12-01 RX ADMIN — HYDROCODONE BITARTRATE AND ACETAMINOPHEN 1 TABLET: 5; 325 TABLET ORAL at 08:49

## 2020-12-01 RX ADMIN — MINERAL OIL, PETROLATUM 1 APPLICATION.: 425; 573 OINTMENT OPHTHALMIC at 04:29

## 2020-12-01 RX ADMIN — POTASSIUM CHLORIDE 20 MEQ: 14.9 INJECTION, SOLUTION INTRAVENOUS at 11:18

## 2020-12-01 RX ADMIN — ACETAMINOPHEN ORAL SOLUTION 650 MG: 650 SOLUTION ORAL at 00:22

## 2020-12-01 RX ADMIN — HYDROCODONE BITARTRATE AND ACETAMINOPHEN 1 TABLET: 5; 325 TABLET ORAL at 04:27

## 2020-12-01 RX ADMIN — SODIUM CHLORIDE, PRESERVATIVE FREE 2 ML: 5 INJECTION INTRAVENOUS at 08:08

## 2020-12-01 RX ADMIN — DOCUSATE SODIUM AND SENNOSIDES 2 TABLET: 8.6; 5 TABLET ORAL at 19:52

## 2020-12-01 RX ADMIN — CHLORHEXIDINE GLUCONATE 15 ML: 1.2 RINSE ORAL at 19:51

## 2020-12-01 RX ADMIN — MINERAL OIL, PETROLATUM 1 APPLICATION.: 425; 573 OINTMENT OPHTHALMIC at 08:09

## 2020-12-01 RX ADMIN — ACYCLOVIR 400 MG: 200 SUSPENSION ORAL at 04:56

## 2020-12-01 RX ADMIN — CHLORHEXIDINE GLUCONATE 15 ML: 1.2 RINSE ORAL at 08:07

## 2020-12-01 RX ADMIN — APIXABAN 5 MG: 5 TABLET, FILM COATED ORAL at 11:18

## 2020-12-01 RX ADMIN — DOCUSATE SODIUM AND SENNOSIDES 2 TABLET: 8.6; 5 TABLET ORAL at 08:06

## 2020-12-01 RX ADMIN — PREDNISONE 20 MG: 20 TABLET ORAL at 08:49

## 2020-12-01 RX ADMIN — INSULIN GLARGINE 24 UNITS: 100 INJECTION, SOLUTION SUBCUTANEOUS at 19:51

## 2020-12-01 RX ADMIN — FUROSEMIDE 20 MG: 10 INJECTION, SOLUTION INTRAMUSCULAR; INTRAVENOUS at 08:06

## 2020-12-01 RX ADMIN — CLONIDINE HYDROCHLORIDE 0.1 MG: 0.1 TABLET ORAL at 19:52

## 2020-12-01 RX ADMIN — MINERAL OIL, PETROLATUM 1 APPLICATION.: 425; 573 OINTMENT OPHTHALMIC at 23:20

## 2020-12-01 RX ADMIN — ALBUTEROL SULFATE 2.5 MG: 2.5 SOLUTION RESPIRATORY (INHALATION) at 20:58

## 2020-12-01 RX ADMIN — CLONAZEPAM 1 MG: 1 TABLET ORAL at 19:51

## 2020-12-01 RX ADMIN — POTASSIUM CHLORIDE 20 MEQ: 14.9 INJECTION, SOLUTION INTRAVENOUS at 13:40

## 2020-12-01 RX ADMIN — APIXABAN 5 MG: 5 TABLET, FILM COATED ORAL at 19:51

## 2020-12-01 RX ADMIN — MINERAL OIL, PETROLATUM 1 APPLICATION.: 425; 573 OINTMENT OPHTHALMIC at 12:00

## 2020-12-01 RX ADMIN — CLONAZEPAM 1 MG: 1 TABLET ORAL at 08:07

## 2020-12-01 RX ADMIN — ALBUTEROL SULFATE 2.5 MG: 2.5 SOLUTION RESPIRATORY (INHALATION) at 09:15

## 2020-12-01 RX ADMIN — INSULIN HUMAN 7 UNITS: 100 INJECTION, SOLUTION PARENTERAL at 11:44

## 2020-12-01 RX ADMIN — CODEINE SULFATE 30 MG: 30 TABLET ORAL at 19:52

## 2020-12-01 RX ADMIN — CODEINE SULFATE 30 MG: 30 TABLET ORAL at 14:38

## 2020-12-01 ASSESSMENT — PAIN SCALES - BEHAVIORAL PAIN SCALE (BPS): BPS_SCORE: 4

## 2020-12-01 ASSESSMENT — PAIN SCALES - WONG BAKER
WONGBAKER_NUMERICALRESPONSE: 0
WONGBAKER_NUMERICALRESPONSE: 2
WONGBAKER_NUMERICALRESPONSE: 0
WONGBAKER_NUMERICALRESPONSE: 8
WONGBAKER_NUMERICALRESPONSE: 8

## 2020-12-02 VITALS
HEIGHT: 65 IN | SYSTOLIC BLOOD PRESSURE: 110 MMHG | DIASTOLIC BLOOD PRESSURE: 73 MMHG | BODY MASS INDEX: 29.79 KG/M2 | HEART RATE: 112 BPM | OXYGEN SATURATION: 97 % | WEIGHT: 178.79 LBS | TEMPERATURE: 97.7 F | RESPIRATION RATE: 25 BRPM

## 2020-12-02 LAB
ANION GAP SERPL CALC-SCNC: 9 MMOL/L (ref 10–20)
BUN SERPL-MCNC: 34 MG/DL (ref 6–20)
BUN/CREAT SERPL: 52 (ref 7–25)
CALCIUM SERPL-MCNC: 9 MG/DL (ref 8.4–10.2)
CHLORIDE SERPL-SCNC: 105 MMOL/L (ref 98–107)
CO2 SERPL-SCNC: 33 MMOL/L (ref 21–32)
CREAT SERPL-MCNC: 0.65 MG/DL (ref 0.51–0.95)
ERYTHROCYTE [DISTWIDTH] IN BLOOD: 15.6 % (ref 11–15)
GAMMA INTERFERON BACKGROUND BLD IA-ACNC: 0.03 IU/ML
GLUCOSE BLDC GLUCOMTR-MCNC: 157 MG/DL (ref 70–99)
GLUCOSE BLDC GLUCOMTR-MCNC: 220 MG/DL (ref 70–99)
GLUCOSE SERPL-MCNC: 148 MG/DL (ref 65–99)
HCT VFR BLD CALC: 32.8 % (ref 36–46.5)
HGB BLD-MCNC: 10.4 G/DL (ref 12–15.5)
M TB IFN-G BLD-IMP: NEGATIVE
M TB IFN-G CD4+ BCKGRND COR BLD-ACNC: 0.04 IU/ML
M TB IFN-G CD4+CD8+ BCKGRND COR BLD-ACNC: 0.01 IU/ML
MAGNESIUM SERPL-MCNC: 2.4 MG/DL (ref 1.7–2.4)
MCH RBC QN AUTO: 29.7 PG (ref 26–34)
MCHC RBC AUTO-ENTMCNC: 31.7 G/DL (ref 32–36.5)
MCV RBC AUTO: 93.7 FL (ref 78–100)
MITOGEN IGNF BCKGRD COR BLD-ACNC: 2.5 IU/ML
NRBC BLD MANUAL-RTO: 0 /100 WBC
PHOSPHATE SERPL-MCNC: 3.6 MG/DL (ref 2.4–4.7)
PLATELET # BLD: 273 K/MCL (ref 140–450)
POTASSIUM SERPL-SCNC: 3.9 MMOL/L (ref 3.4–5.1)
RBC # BLD: 3.5 MIL/MCL (ref 4–5.2)
SODIUM SERPL-SCNC: 143 MMOL/L (ref 135–145)
WBC # BLD: 13.4 K/MCL (ref 4.2–11)

## 2020-12-02 PROCEDURE — 97530 THERAPEUTIC ACTIVITIES: CPT

## 2020-12-02 PROCEDURE — 80048 BASIC METABOLIC PNL TOTAL CA: CPT

## 2020-12-02 PROCEDURE — 10002803 HB RX 637: Performed by: INTERNAL MEDICINE

## 2020-12-02 PROCEDURE — 83735 ASSAY OF MAGNESIUM: CPT

## 2020-12-02 PROCEDURE — 10004651 HB RX, NO CHARGE ITEM: Performed by: INTERNAL MEDICINE

## 2020-12-02 PROCEDURE — 10002801 HB RX 250 W/O HCPCS: Performed by: INTERNAL MEDICINE

## 2020-12-02 PROCEDURE — 84100 ASSAY OF PHOSPHORUS: CPT

## 2020-12-02 PROCEDURE — 99239 HOSP IP/OBS DSCHRG MGMT >30: CPT | Performed by: INTERNAL MEDICINE

## 2020-12-02 PROCEDURE — 10002800 HB RX 250 W HCPCS: Performed by: INTERNAL MEDICINE

## 2020-12-02 PROCEDURE — 10004281 HB COUNTER-STAFF TIME PER 15 MIN

## 2020-12-02 PROCEDURE — 97163 PT EVAL HIGH COMPLEX 45 MIN: CPT

## 2020-12-02 PROCEDURE — 85027 COMPLETE CBC AUTOMATED: CPT

## 2020-12-02 PROCEDURE — 94003 VENT MGMT INPAT SUBQ DAY: CPT

## 2020-12-02 PROCEDURE — 94640 AIRWAY INHALATION TREATMENT: CPT

## 2020-12-02 PROCEDURE — 97166 OT EVAL MOD COMPLEX 45 MIN: CPT

## 2020-12-02 RX ORDER — ACETAMINOPHEN 160 MG/5ML
650 LIQUID ORAL EVERY 6 HOURS PRN
Qty: 250 ML | Refills: 0 | Status: SHIPPED
Start: 2020-12-02

## 2020-12-02 RX ORDER — HUMAN INSULIN 100 [IU]/ML
5 INJECTION, SOLUTION SUBCUTANEOUS EVERY 6 HOURS SCHEDULED
Qty: 10 ML | Refills: 12 | Status: SHIPPED
Start: 2020-12-02

## 2020-12-02 RX ORDER — AMOXICILLIN 250 MG
2 CAPSULE ORAL 2 TIMES DAILY
Qty: 30 TABLET | Refills: 0 | Status: SHIPPED
Start: 2020-12-02

## 2020-12-02 RX ORDER — HYDROCODONE BITARTRATE AND ACETAMINOPHEN 5; 325 MG/1; MG/1
1 TABLET ORAL EVERY 4 HOURS PRN
Qty: 20 TABLET | Refills: 0 | Status: SHIPPED
Start: 2020-12-02

## 2020-12-02 RX ORDER — PREDNISONE 10 MG/1
10 TABLET ORAL
Qty: 6 TABLET | Refills: 0 | Status: SHIPPED
Start: 2020-12-03

## 2020-12-02 RX ORDER — CLONAZEPAM 1 MG/1
1 TABLET ORAL EVERY 12 HOURS SCHEDULED
Qty: 30 TABLET | Refills: 0 | Status: SHIPPED
Start: 2020-12-02

## 2020-12-02 RX ORDER — CLONIDINE HYDROCHLORIDE 0.1 MG/1
0.1 TABLET ORAL EVERY 12 HOURS SCHEDULED
Qty: 30 TABLET | Refills: 0 | Status: SHIPPED
Start: 2020-12-02

## 2020-12-02 RX ORDER — POTASSIUM CHLORIDE 1.5 G/1.58G
40 POWDER, FOR SOLUTION ORAL ONCE
Status: COMPLETED | OUTPATIENT
Start: 2020-12-02 | End: 2020-12-02

## 2020-12-02 RX ORDER — SULFAMETHOXAZOLE AND TRIMETHOPRIM 200; 40 MG/5ML; MG/5ML
20 SUSPENSION ORAL
Qty: 180 ML | Refills: 0 | Status: SHIPPED
Start: 2020-12-02 | End: 2020-12-23

## 2020-12-02 RX ORDER — INSULIN GLARGINE 100 [IU]/ML
24 INJECTION, SOLUTION SUBCUTANEOUS NIGHTLY
Qty: 10 ML | Refills: 12 | Status: SHIPPED | OUTPATIENT
Start: 2020-12-02

## 2020-12-02 RX ORDER — FENTANYL 75 UG/1
1 PATCH TRANSDERMAL
Qty: 3 EACH | Refills: 0 | Status: SHIPPED
Start: 2020-12-03

## 2020-12-02 RX ADMIN — POTASSIUM CHLORIDE 40 MEQ: 1.5 POWDER, FOR SOLUTION ORAL at 08:35

## 2020-12-02 RX ADMIN — APIXABAN 5 MG: 5 TABLET, FILM COATED ORAL at 08:36

## 2020-12-02 RX ADMIN — ALBUTEROL SULFATE 2.5 MG: 2.5 SOLUTION RESPIRATORY (INHALATION) at 07:42

## 2020-12-02 RX ADMIN — MINERAL OIL, PETROLATUM 1 APPLICATION.: 425; 573 OINTMENT OPHTHALMIC at 12:03

## 2020-12-02 RX ADMIN — CLONIDINE HYDROCHLORIDE 0.1 MG: 0.1 TABLET ORAL at 08:36

## 2020-12-02 RX ADMIN — CODEINE SULFATE 30 MG: 30 TABLET ORAL at 05:36

## 2020-12-02 RX ADMIN — CLONAZEPAM 1 MG: 1 TABLET ORAL at 08:36

## 2020-12-02 RX ADMIN — HYDROCODONE BITARTRATE AND ACETAMINOPHEN 1 TABLET: 5; 325 TABLET ORAL at 08:36

## 2020-12-02 RX ADMIN — INSULIN HUMAN 7 UNITS: 100 INJECTION, SOLUTION PARENTERAL at 17:41

## 2020-12-02 RX ADMIN — MINERAL OIL, PETROLATUM 1 APPLICATION.: 425; 573 OINTMENT OPHTHALMIC at 08:40

## 2020-12-02 RX ADMIN — PREDNISONE 10 MG: 20 TABLET ORAL at 08:36

## 2020-12-02 RX ADMIN — INSULIN HUMAN 9 UNITS: 100 INJECTION, SOLUTION PARENTERAL at 12:02

## 2020-12-02 RX ADMIN — PANTOPRAZOLE 40 MG: 40 TABLET, DELAYED RELEASE ORAL at 05:36

## 2020-12-02 RX ADMIN — SULFAMETHOXAZOLE AND TRIMETHOPRIM 20 ML: 200; 40 SUSPENSION ORAL at 08:36

## 2020-12-02 RX ADMIN — CHLORHEXIDINE GLUCONATE 15 ML: 1.2 RINSE ORAL at 08:36

## 2020-12-02 RX ADMIN — SODIUM CHLORIDE, PRESERVATIVE FREE 2 ML: 5 INJECTION INTRAVENOUS at 08:37

## 2020-12-02 RX ADMIN — DOCUSATE SODIUM AND SENNOSIDES 2 TABLET: 8.6; 5 TABLET ORAL at 08:36

## 2020-12-02 RX ADMIN — POLYETHYLENE GLYCOL (3350) 17 G: 17 POWDER, FOR SOLUTION ORAL at 08:35

## 2020-12-02 RX ADMIN — ALBUTEROL SULFATE 2.5 MG: 2.5 SOLUTION RESPIRATORY (INHALATION) at 13:17

## 2020-12-02 RX ADMIN — ACYCLOVIR 400 MG: 200 SUSPENSION ORAL at 17:41

## 2020-12-02 RX ADMIN — MINERAL OIL, PETROLATUM 1 APPLICATION.: 425; 573 OINTMENT OPHTHALMIC at 04:50

## 2020-12-02 RX ADMIN — ACYCLOVIR 400 MG: 200 SUSPENSION ORAL at 04:50

## 2020-12-02 ASSESSMENT — COGNITIVE AND FUNCTIONAL STATUS - GENERAL
REMEMBERING WHERE THINGS ARE: UNABLE
HELP NEEDED DRESSING REGULAR LOWER BODY CLOTHING: TOTAL
HELP NEEDED FOR PERSONAL GROOMING: TOTAL
REMEMBERING 5 ERRANDS WITH NO LIST: UNABLE
HELP NEEDED FOR TOILETING: TOTAL
APPLIED_COGNITIVE_CONVERTED_SCORE: 19.32
UNDERSTANDING 10 TO 15 MIN SPEECH: A LOT
HELP NEEDED DRESSING REGULAR UPPER BODY CLOTHING: TOTAL
HELP NEEDED FOR BATHING: TOTAL
DAILY_ACTIVITY_CONVERTED_SCORE: 17.06
REMEMBERING TO TAKE MEDICATION: UNABLE
DAILY_ACTIVITY_RAW_SCORE: 6
TAKING CARE OF COMPLICATED TASKS: UNABLE
BASIC_MOBILITY_CONVERTED_SCORE: 16.59
BASIC_MOBILITY_RAW_SCORE: 6
APPLIED_COGNITIVE_RAW_SCORE: 8
FOLLOWS FAMILIAR CONVERSATION: A LOT

## 2020-12-02 ASSESSMENT — ACTIVITIES OF DAILY LIVING (ADL): PRIOR_ADL: INDEPENDENT

## 2020-12-02 ASSESSMENT — MOVEMENT AND STRENGTH ASSESSMENTS
RLE_ASSESSMENT: FAIR/COMPLETE ROM AGAINST GRAVITY, NO ADDED RESISTANCE
LLE_ASSESSMENT: FAIR/COMPLETE ROM AGAINST GRAVITY, NO ADDED RESISTANCE
LUE_ASSESSMENT: FAIR/COMPLETE ROM AGAINST GRAVITY, NO ADDED RESISTANCE
RUE_ASSESSMENT: FAIR/COMPLETE ROM AGAINST GRAVITY, NO ADDED RESISTANCE

## 2020-12-03 ENCOUNTER — TELEPHONE (OUTPATIENT)
Dept: CARE COORDINATION | Age: 55
End: 2020-12-03

## 2020-12-04 ENCOUNTER — TELEPHONE (OUTPATIENT)
Dept: CARE COORDINATION | Age: 55
End: 2020-12-04

## 2020-12-05 ENCOUNTER — LAB REQUISITION (OUTPATIENT)
Dept: LAB | Age: 55
End: 2020-12-05

## 2020-12-05 DIAGNOSIS — Z13.9 ENCOUNTER FOR SCREENING, UNSPECIFIED: ICD-10-CM

## 2020-12-05 LAB
BASOPHILS # BLD: 0 K/MCL (ref 0–0.3)
BASOPHILS NFR BLD: 0 %
DEPRECATED RDW RBC: 50.1 FL (ref 39–50)
EOSINOPHIL # BLD: 0.3 K/MCL (ref 0–0.5)
EOSINOPHIL NFR BLD: 3 %
ERYTHROCYTE [DISTWIDTH] IN BLOOD: 14.8 % (ref 11–15)
HCT VFR BLD CALC: 30.6 % (ref 36–46.5)
HGB BLD-MCNC: 9.6 G/DL (ref 12–15.5)
IMM GRANULOCYTES # BLD AUTO: 0 K/MCL (ref 0–0.2)
IMM GRANULOCYTES # BLD: 0 %
LYMPHOCYTES # BLD: 1.3 K/MCL (ref 1–4)
LYMPHOCYTES NFR BLD: 12 %
MCH RBC QN AUTO: 29.3 PG (ref 26–34)
MCHC RBC AUTO-ENTMCNC: 31.4 G/DL (ref 32–36.5)
MCV RBC AUTO: 93.3 FL (ref 78–100)
MONOCYTES # BLD: 0.6 K/MCL (ref 0.3–0.9)
MONOCYTES NFR BLD: 5 %
NEUTROPHILS # BLD: 8.7 K/MCL (ref 1.8–7.7)
NEUTROPHILS NFR BLD: 80 %
NRBC BLD MANUAL-RTO: 0 /100 WBC
PLATELET # BLD AUTO: 270 K/MCL (ref 140–450)
RBC # BLD: 3.28 MIL/MCL (ref 4–5.2)
WBC # BLD: 10.9 K/MCL (ref 4.2–11)

## 2020-12-05 PROCEDURE — 85025 COMPLETE CBC W/AUTO DIFF WBC: CPT | Performed by: CLINICAL MEDICAL LABORATORY

## 2020-12-06 LAB
BASOPHILS # BLD: 0.1 K/MCL (ref 0–0.3)
BASOPHILS NFR BLD: 1 %
DEPRECATED RDW RBC: 49.7 FL (ref 39–50)
EOSINOPHIL # BLD: 0.4 K/MCL (ref 0–0.5)
EOSINOPHIL NFR BLD: 4 %
ERYTHROCYTE [DISTWIDTH] IN BLOOD: 14.7 % (ref 11–15)
HCT VFR BLD CALC: 28.7 % (ref 36–46.5)
HGB BLD-MCNC: 9.1 G/DL (ref 12–15.5)
IMM GRANULOCYTES # BLD AUTO: 0 K/MCL (ref 0–0.2)
IMM GRANULOCYTES # BLD: 0 %
LYMPHOCYTES # BLD: 1.3 K/MCL (ref 1–4)
LYMPHOCYTES NFR BLD: 13 %
MCH RBC QN AUTO: 29.6 PG (ref 26–34)
MCHC RBC AUTO-ENTMCNC: 31.7 G/DL (ref 32–36.5)
MCV RBC AUTO: 93.5 FL (ref 78–100)
MONOCYTES # BLD: 0.5 K/MCL (ref 0.3–0.9)
MONOCYTES NFR BLD: 5 %
NEUTROPHILS # BLD: 7.8 K/MCL (ref 1.8–7.7)
NEUTROPHILS NFR BLD: 77 %
NRBC BLD MANUAL-RTO: 0 /100 WBC
PLATELET # BLD AUTO: 262 K/MCL (ref 140–450)
RBC # BLD: 3.07 MIL/MCL (ref 4–5.2)
WBC # BLD: 10 K/MCL (ref 4.2–11)

## 2020-12-06 PROCEDURE — 85025 COMPLETE CBC W/AUTO DIFF WBC: CPT | Performed by: CLINICAL MEDICAL LABORATORY

## 2020-12-07 LAB
DEPRECATED RDW RBC: 49.2 FL (ref 39–50)
ERYTHROCYTE [DISTWIDTH] IN BLOOD: 14.8 % (ref 11–15)
HCT VFR BLD CALC: 29.7 % (ref 36–46.5)
HGB BLD-MCNC: 9.4 G/DL (ref 12–15.5)
MCH RBC QN AUTO: 29.3 PG (ref 26–34)
MCHC RBC AUTO-ENTMCNC: 31.6 G/DL (ref 32–36.5)
MCV RBC AUTO: 92.5 FL (ref 78–100)
NRBC BLD MANUAL-RTO: 0 /100 WBC
PLATELET # BLD AUTO: 263 K/MCL (ref 140–450)
RBC # BLD: 3.21 MIL/MCL (ref 4–5.2)
WBC # BLD: 10.3 K/MCL (ref 4.2–11)

## 2020-12-07 PROCEDURE — 85027 COMPLETE CBC AUTOMATED: CPT | Performed by: CLINICAL MEDICAL LABORATORY

## 2020-12-07 PROCEDURE — PSEU10425 CBC NO DIFFERENTIAL (PERFORMABLE ONLY): Performed by: CLINICAL MEDICAL LABORATORY

## 2020-12-08 LAB
ALBUMIN SERPL-MCNC: 2.3 G/DL (ref 3.6–5.1)
ALBUMIN/GLOB SERPL: 0.6 {RATIO} (ref 1–2.4)
ALP SERPL-CCNC: 108 UNITS/L (ref 45–117)
ALT SERPL-CCNC: 20 UNITS/L
ANION GAP SERPL CALC-SCNC: 13 MMOL/L (ref 10–20)
AST SERPL-CCNC: 15 UNITS/L
BILIRUB SERPL-MCNC: 0.7 MG/DL (ref 0.2–1)
BUN SERPL-MCNC: 8 MG/DL (ref 6–20)
BUN/CREAT SERPL: 15 (ref 7–25)
CALCIUM SERPL-MCNC: 8.3 MG/DL (ref 8.4–10.2)
CHLORIDE SERPL-SCNC: 103 MMOL/L (ref 98–107)
CO2 SERPL-SCNC: 26 MMOL/L (ref 21–32)
CREAT SERPL-MCNC: 0.53 MG/DL (ref 0.51–0.95)
DEPRECATED RDW RBC: 50.4 FL (ref 39–50)
ERYTHROCYTE [DISTWIDTH] IN BLOOD: 15.2 % (ref 11–15)
FASTING DURATION TIME PATIENT: ABNORMAL H
GFR SERPLBLD BASED ON 1.73 SQ M-ARVRAT: >90 ML/MIN/1.73M2
GLOBULIN SER-MCNC: 3.9 G/DL (ref 2–4)
GLUCOSE SERPL-MCNC: 137 MG/DL (ref 65–99)
HCT VFR BLD CALC: 28 % (ref 36–46.5)
HGB BLD-MCNC: 8.9 G/DL (ref 12–15.5)
MAGNESIUM SERPL-MCNC: 2.2 MG/DL (ref 1.7–2.4)
MCH RBC QN AUTO: 29.1 PG (ref 26–34)
MCHC RBC AUTO-ENTMCNC: 31.8 G/DL (ref 32–36.5)
MCV RBC AUTO: 91.5 FL (ref 78–100)
NRBC BLD MANUAL-RTO: 0 /100 WBC
PHOSPHATE SERPL-MCNC: 4.6 MG/DL (ref 2.4–4.7)
PLATELET # BLD AUTO: 215 K/MCL (ref 140–450)
POTASSIUM SERPL-SCNC: 3.7 MMOL/L (ref 3.4–5.1)
PROT SERPL-MCNC: 6.2 G/DL (ref 6.4–8.2)
RBC # BLD: 3.06 MIL/MCL (ref 4–5.2)
SODIUM SERPL-SCNC: 138 MMOL/L (ref 135–145)
WBC # BLD: 7.4 K/MCL (ref 4.2–11)

## 2020-12-08 PROCEDURE — 83735 ASSAY OF MAGNESIUM: CPT | Performed by: CLINICAL MEDICAL LABORATORY

## 2020-12-08 PROCEDURE — PSEU8274 MAGNESIUM: Performed by: CLINICAL MEDICAL LABORATORY

## 2020-12-08 PROCEDURE — 84100 ASSAY OF PHOSPHORUS: CPT | Performed by: CLINICAL MEDICAL LABORATORY

## 2020-12-08 PROCEDURE — 85027 COMPLETE CBC AUTOMATED: CPT | Performed by: CLINICAL MEDICAL LABORATORY

## 2020-12-08 PROCEDURE — PSEU8279 PHOSPHORUS: Performed by: CLINICAL MEDICAL LABORATORY

## 2020-12-08 PROCEDURE — PSEU10425 CBC NO DIFFERENTIAL (PERFORMABLE ONLY): Performed by: CLINICAL MEDICAL LABORATORY

## 2020-12-08 PROCEDURE — PSEU8250 COMPREHENSIVE METABOLIC PANEL: Performed by: CLINICAL MEDICAL LABORATORY

## 2020-12-08 PROCEDURE — 80053 COMPREHEN METABOLIC PANEL: CPT | Performed by: CLINICAL MEDICAL LABORATORY

## 2020-12-09 ENCOUNTER — TELEPHONE (OUTPATIENT)
Dept: CARE COORDINATION | Age: 55
End: 2020-12-09

## 2020-12-10 LAB
AMYLASE SERPL-CCNC: 36 UNITS/L (ref 25–115)
D DIMER PPP FEU-MCNC: 1.53 MG/L (FEU)
DEPRECATED RDW RBC: 50.9 FL (ref 39–50)
ERYTHROCYTE [DISTWIDTH] IN BLOOD: 14.9 % (ref 11–15)
HCT VFR BLD CALC: 28.7 % (ref 36–46.5)
HGB BLD-MCNC: 9.1 G/DL (ref 12–15.5)
LIPASE SERPL-CCNC: 278 UNITS/L (ref 73–393)
MCH RBC QN AUTO: 29.6 PG (ref 26–34)
MCHC RBC AUTO-ENTMCNC: 31.7 G/DL (ref 32–36.5)
MCV RBC AUTO: 93.5 FL (ref 78–100)
NRBC BLD MANUAL-RTO: 0 /100 WBC
PLATELET # BLD AUTO: 192 K/MCL (ref 140–450)
RBC # BLD: 3.07 MIL/MCL (ref 4–5.2)
WBC # BLD: 6.5 K/MCL (ref 4.2–11)

## 2020-12-10 PROCEDURE — 85027 COMPLETE CBC AUTOMATED: CPT | Performed by: CLINICAL MEDICAL LABORATORY

## 2020-12-10 PROCEDURE — PSEU8269 LIPASE: Performed by: CLINICAL MEDICAL LABORATORY

## 2020-12-10 PROCEDURE — 83690 ASSAY OF LIPASE: CPT | Performed by: CLINICAL MEDICAL LABORATORY

## 2020-12-10 PROCEDURE — PSEU10425 CBC NO DIFFERENTIAL (PERFORMABLE ONLY): Performed by: CLINICAL MEDICAL LABORATORY

## 2020-12-10 PROCEDURE — PSEU8234 AMYLASE: Performed by: CLINICAL MEDICAL LABORATORY

## 2020-12-10 PROCEDURE — PSEU8427 D DIMER, QUANTITATIVE: Performed by: CLINICAL MEDICAL LABORATORY

## 2020-12-10 PROCEDURE — 82150 ASSAY OF AMYLASE: CPT | Performed by: CLINICAL MEDICAL LABORATORY

## 2020-12-10 PROCEDURE — 85379 FIBRIN DEGRADATION QUANT: CPT | Performed by: CLINICAL MEDICAL LABORATORY

## 2020-12-11 LAB
ANION GAP SERPL CALC-SCNC: 9 MMOL/L (ref 10–20)
BUN SERPL-MCNC: 12 MG/DL (ref 6–20)
BUN/CREAT SERPL: 23 (ref 7–25)
CALCIUM SERPL-MCNC: 8.3 MG/DL (ref 8.4–10.2)
CHLORIDE SERPL-SCNC: 102 MMOL/L (ref 98–107)
CO2 SERPL-SCNC: 32 MMOL/L (ref 21–32)
CREAT SERPL-MCNC: 0.53 MG/DL (ref 0.51–0.95)
DEPRECATED RDW RBC: 49.9 FL (ref 39–50)
ERYTHROCYTE [DISTWIDTH] IN BLOOD: 14.8 % (ref 11–15)
FASTING DURATION TIME PATIENT: ABNORMAL H
GFR SERPLBLD BASED ON 1.73 SQ M-ARVRAT: >90 ML/MIN/1.73M2
GLUCOSE SERPL-MCNC: 206 MG/DL (ref 65–99)
HCT VFR BLD CALC: 24.8 % (ref 36–46.5)
HGB BLD-MCNC: 7.7 G/DL (ref 12–15.5)
MAGNESIUM SERPL-MCNC: 2.4 MG/DL (ref 1.7–2.4)
MCH RBC QN AUTO: 28.9 PG (ref 26–34)
MCHC RBC AUTO-ENTMCNC: 31 G/DL (ref 32–36.5)
MCV RBC AUTO: 93.2 FL (ref 78–100)
NRBC BLD MANUAL-RTO: 0 /100 WBC
PHOSPHATE SERPL-MCNC: 3.8 MG/DL (ref 2.4–4.7)
PLATELET # BLD AUTO: 189 K/MCL (ref 140–450)
POTASSIUM SERPL-SCNC: 4 MMOL/L (ref 3.4–5.1)
PROCALCITONIN SERPL IA-MCNC: <0.05 NG/ML
RBC # BLD: 2.66 MIL/MCL (ref 4–5.2)
SODIUM SERPL-SCNC: 139 MMOL/L (ref 135–145)
WBC # BLD: 5.8 K/MCL (ref 4.2–11)

## 2020-12-11 PROCEDURE — 85027 COMPLETE CBC AUTOMATED: CPT | Performed by: CLINICAL MEDICAL LABORATORY

## 2020-12-11 PROCEDURE — PSEU8153 PROCALCITONIN: Performed by: CLINICAL MEDICAL LABORATORY

## 2020-12-11 PROCEDURE — 84145 PROCALCITONIN (PCT): CPT | Performed by: CLINICAL MEDICAL LABORATORY

## 2020-12-11 PROCEDURE — 83735 ASSAY OF MAGNESIUM: CPT | Performed by: CLINICAL MEDICAL LABORATORY

## 2020-12-11 PROCEDURE — 80048 BASIC METABOLIC PNL TOTAL CA: CPT | Performed by: CLINICAL MEDICAL LABORATORY

## 2020-12-11 PROCEDURE — 84100 ASSAY OF PHOSPHORUS: CPT | Performed by: CLINICAL MEDICAL LABORATORY

## 2020-12-11 PROCEDURE — PSEU10425 CBC NO DIFFERENTIAL (PERFORMABLE ONLY): Performed by: CLINICAL MEDICAL LABORATORY

## 2020-12-11 PROCEDURE — PSEU8235 BASIC METABOLIC PANEL: Performed by: CLINICAL MEDICAL LABORATORY

## 2020-12-11 PROCEDURE — PSEU8279 PHOSPHORUS: Performed by: CLINICAL MEDICAL LABORATORY

## 2020-12-11 PROCEDURE — PSEU8274 MAGNESIUM: Performed by: CLINICAL MEDICAL LABORATORY

## 2020-12-12 LAB
DEPRECATED RDW RBC: 49.5 FL (ref 39–50)
ERYTHROCYTE [DISTWIDTH] IN BLOOD: 14.6 % (ref 11–15)
HCT VFR BLD CALC: 27.4 % (ref 36–46.5)
HGB BLD-MCNC: 8.6 G/DL (ref 12–15.5)
MCH RBC QN AUTO: 29.1 PG (ref 26–34)
MCHC RBC AUTO-ENTMCNC: 31.4 G/DL (ref 32–36.5)
MCV RBC AUTO: 92.6 FL (ref 78–100)
NRBC BLD MANUAL-RTO: 0 /100 WBC
PLATELET # BLD AUTO: 187 K/MCL (ref 140–450)
RBC # BLD: 2.96 MIL/MCL (ref 4–5.2)
WBC # BLD: 5.6 K/MCL (ref 4.2–11)

## 2020-12-12 PROCEDURE — PSEU10425 CBC NO DIFFERENTIAL (PERFORMABLE ONLY): Performed by: CLINICAL MEDICAL LABORATORY

## 2020-12-12 PROCEDURE — 85027 COMPLETE CBC AUTOMATED: CPT | Performed by: CLINICAL MEDICAL LABORATORY

## 2020-12-15 LAB
ALBUMIN SERPL-MCNC: 2.3 G/DL (ref 3.6–5.1)
ALBUMIN/GLOB SERPL: 0.5 {RATIO} (ref 1–2.4)
ALP SERPL-CCNC: 116 UNITS/L (ref 45–117)
ALT SERPL-CCNC: 10 UNITS/L
ANION GAP SERPL CALC-SCNC: 14 MMOL/L (ref 10–20)
AST SERPL-CCNC: 13 UNITS/L
BILIRUB SERPL-MCNC: 0.4 MG/DL (ref 0.2–1)
BUN SERPL-MCNC: 17 MG/DL (ref 6–20)
BUN/CREAT SERPL: 30 (ref 7–25)
CALCIUM SERPL-MCNC: 8.8 MG/DL (ref 8.4–10.2)
CHLORIDE SERPL-SCNC: 101 MMOL/L (ref 98–107)
CO2 SERPL-SCNC: 28 MMOL/L (ref 21–32)
CREAT SERPL-MCNC: 0.57 MG/DL (ref 0.51–0.95)
DEPRECATED RDW RBC: 50.1 FL (ref 39–50)
ERYTHROCYTE [DISTWIDTH] IN BLOOD: 15 % (ref 11–15)
FASTING DURATION TIME PATIENT: ABNORMAL H
GFR SERPLBLD BASED ON 1.73 SQ M-ARVRAT: >90 ML/MIN/1.73M2
GLOBULIN SER-MCNC: 4.3 G/DL (ref 2–4)
GLUCOSE SERPL-MCNC: 172 MG/DL (ref 65–99)
HCT VFR BLD CALC: 26.6 % (ref 36–46.5)
HGB BLD-MCNC: 8.6 G/DL (ref 12–15.5)
MAGNESIUM SERPL-MCNC: 2.3 MG/DL (ref 1.7–2.4)
MCH RBC QN AUTO: 29.8 PG (ref 26–34)
MCHC RBC AUTO-ENTMCNC: 32.3 G/DL (ref 32–36.5)
MCV RBC AUTO: 92 FL (ref 78–100)
NRBC BLD MANUAL-RTO: 0 /100 WBC
PHOSPHATE SERPL-MCNC: 5.6 MG/DL (ref 2.4–4.7)
PLATELET # BLD AUTO: 218 K/MCL (ref 140–450)
POTASSIUM SERPL-SCNC: 4.4 MMOL/L (ref 3.4–5.1)
PROT SERPL-MCNC: 6.6 G/DL (ref 6.4–8.2)
RBC # BLD: 2.89 MIL/MCL (ref 4–5.2)
SODIUM SERPL-SCNC: 139 MMOL/L (ref 135–145)
WBC # BLD: 6.9 K/MCL (ref 4.2–11)

## 2020-12-15 PROCEDURE — PSEU8250 COMPREHENSIVE METABOLIC PANEL: Performed by: CLINICAL MEDICAL LABORATORY

## 2020-12-15 PROCEDURE — 84100 ASSAY OF PHOSPHORUS: CPT | Performed by: CLINICAL MEDICAL LABORATORY

## 2020-12-15 PROCEDURE — PSEU10425 CBC NO DIFFERENTIAL (PERFORMABLE ONLY): Performed by: CLINICAL MEDICAL LABORATORY

## 2020-12-15 PROCEDURE — PSEU8274 MAGNESIUM: Performed by: CLINICAL MEDICAL LABORATORY

## 2020-12-15 PROCEDURE — PSEU8279 PHOSPHORUS: Performed by: CLINICAL MEDICAL LABORATORY

## 2020-12-15 PROCEDURE — 80053 COMPREHEN METABOLIC PANEL: CPT | Performed by: CLINICAL MEDICAL LABORATORY

## 2020-12-15 PROCEDURE — 85027 COMPLETE CBC AUTOMATED: CPT | Performed by: CLINICAL MEDICAL LABORATORY

## 2020-12-15 PROCEDURE — 83735 ASSAY OF MAGNESIUM: CPT | Performed by: CLINICAL MEDICAL LABORATORY

## 2020-12-18 LAB
ANION GAP SERPL CALC-SCNC: 10 MMOL/L (ref 10–20)
BUN SERPL-MCNC: 18 MG/DL (ref 6–20)
BUN/CREAT SERPL: 35 (ref 7–25)
CALCIUM SERPL-MCNC: 8.6 MG/DL (ref 8.4–10.2)
CHLORIDE SERPL-SCNC: 101 MMOL/L (ref 98–107)
CO2 SERPL-SCNC: 31 MMOL/L (ref 21–32)
CREAT SERPL-MCNC: 0.51 MG/DL (ref 0.51–0.95)
DEPRECATED RDW RBC: 50.6 FL (ref 39–50)
ERYTHROCYTE [DISTWIDTH] IN BLOOD: 15.2 % (ref 11–15)
FASTING DURATION TIME PATIENT: ABNORMAL H
GFR SERPLBLD BASED ON 1.73 SQ M-ARVRAT: >90 ML/MIN/1.73M2
GLUCOSE SERPL-MCNC: 217 MG/DL (ref 65–99)
HCT VFR BLD CALC: 25.7 % (ref 36–46.5)
HGB BLD-MCNC: 8 G/DL (ref 12–15.5)
MAGNESIUM SERPL-MCNC: 2.2 MG/DL (ref 1.7–2.4)
MCH RBC QN AUTO: 28.6 PG (ref 26–34)
MCHC RBC AUTO-ENTMCNC: 31.1 G/DL (ref 32–36.5)
MCV RBC AUTO: 91.8 FL (ref 78–100)
NRBC BLD MANUAL-RTO: 0 /100 WBC
PHOSPHATE SERPL-MCNC: 5.1 MG/DL (ref 2.4–4.7)
PLATELET # BLD AUTO: 219 K/MCL (ref 140–450)
POTASSIUM SERPL-SCNC: 4.1 MMOL/L (ref 3.4–5.1)
RBC # BLD: 2.8 MIL/MCL (ref 4–5.2)
SODIUM SERPL-SCNC: 138 MMOL/L (ref 135–145)
WBC # BLD: 5.6 K/MCL (ref 4.2–11)

## 2020-12-18 PROCEDURE — PSEU8274 MAGNESIUM: Performed by: CLINICAL MEDICAL LABORATORY

## 2020-12-18 PROCEDURE — 80048 BASIC METABOLIC PNL TOTAL CA: CPT | Performed by: CLINICAL MEDICAL LABORATORY

## 2020-12-18 PROCEDURE — 83735 ASSAY OF MAGNESIUM: CPT | Performed by: CLINICAL MEDICAL LABORATORY

## 2020-12-18 PROCEDURE — 84100 ASSAY OF PHOSPHORUS: CPT | Performed by: CLINICAL MEDICAL LABORATORY

## 2020-12-18 PROCEDURE — PSEU10425 CBC NO DIFFERENTIAL (PERFORMABLE ONLY): Performed by: CLINICAL MEDICAL LABORATORY

## 2020-12-18 PROCEDURE — PSEU8235 BASIC METABOLIC PANEL: Performed by: CLINICAL MEDICAL LABORATORY

## 2020-12-18 PROCEDURE — 85027 COMPLETE CBC AUTOMATED: CPT | Performed by: CLINICAL MEDICAL LABORATORY

## 2020-12-18 PROCEDURE — PSEU8279 PHOSPHORUS: Performed by: CLINICAL MEDICAL LABORATORY

## 2020-12-22 LAB
ALBUMIN SERPL-MCNC: 2.7 G/DL (ref 3.6–5.1)
ALBUMIN/GLOB SERPL: 0.6 {RATIO} (ref 1–2.4)
ALP SERPL-CCNC: 131 UNITS/L (ref 45–117)
ALT SERPL-CCNC: 14 UNITS/L
ANION GAP SERPL CALC-SCNC: 13 MMOL/L (ref 10–20)
AST SERPL-CCNC: 15 UNITS/L
BILIRUB SERPL-MCNC: 0.5 MG/DL (ref 0.2–1)
BUN SERPL-MCNC: 17 MG/DL (ref 6–20)
BUN/CREAT SERPL: 40 (ref 7–25)
CALCIUM SERPL-MCNC: 9 MG/DL (ref 8.4–10.2)
CHLORIDE SERPL-SCNC: 99 MMOL/L (ref 98–107)
CO2 SERPL-SCNC: 30 MMOL/L (ref 21–32)
CREAT SERPL-MCNC: 0.43 MG/DL (ref 0.51–0.95)
DEPRECATED RDW RBC: 50.2 FL (ref 39–50)
ERYTHROCYTE [DISTWIDTH] IN BLOOD: 15 % (ref 11–15)
FASTING DURATION TIME PATIENT: ABNORMAL H
GFR SERPLBLD BASED ON 1.73 SQ M-ARVRAT: >90 ML/MIN/1.73M2
GLOBULIN SER-MCNC: 4.6 G/DL (ref 2–4)
GLUCOSE SERPL-MCNC: 200 MG/DL (ref 65–99)
HCT VFR BLD CALC: 29.4 % (ref 36–46.5)
HGB BLD-MCNC: 9.1 G/DL (ref 12–15.5)
MAGNESIUM SERPL-MCNC: 2.2 MG/DL (ref 1.7–2.4)
MCH RBC QN AUTO: 28.4 PG (ref 26–34)
MCHC RBC AUTO-ENTMCNC: 31 G/DL (ref 32–36.5)
MCV RBC AUTO: 91.9 FL (ref 78–100)
NRBC BLD MANUAL-RTO: 0 /100 WBC
PHOSPHATE SERPL-MCNC: 5.1 MG/DL (ref 2.4–4.7)
PLATELET # BLD AUTO: 271 K/MCL (ref 140–450)
POTASSIUM SERPL-SCNC: 4.5 MMOL/L (ref 3.4–5.1)
PROT SERPL-MCNC: 7.3 G/DL (ref 6.4–8.2)
RBC # BLD: 3.2 MIL/MCL (ref 4–5.2)
SODIUM SERPL-SCNC: 137 MMOL/L (ref 135–145)
WBC # BLD: 7.2 K/MCL (ref 4.2–11)

## 2020-12-22 PROCEDURE — PSEU8274 MAGNESIUM: Performed by: CLINICAL MEDICAL LABORATORY

## 2020-12-22 PROCEDURE — 85027 COMPLETE CBC AUTOMATED: CPT | Performed by: CLINICAL MEDICAL LABORATORY

## 2020-12-22 PROCEDURE — 80053 COMPREHEN METABOLIC PANEL: CPT | Performed by: CLINICAL MEDICAL LABORATORY

## 2020-12-22 PROCEDURE — 83735 ASSAY OF MAGNESIUM: CPT | Performed by: CLINICAL MEDICAL LABORATORY

## 2020-12-22 PROCEDURE — PSEU8279 PHOSPHORUS: Performed by: CLINICAL MEDICAL LABORATORY

## 2020-12-22 PROCEDURE — PSEU10425 CBC NO DIFFERENTIAL (PERFORMABLE ONLY): Performed by: CLINICAL MEDICAL LABORATORY

## 2020-12-22 PROCEDURE — 84100 ASSAY OF PHOSPHORUS: CPT | Performed by: CLINICAL MEDICAL LABORATORY

## 2020-12-22 PROCEDURE — PSEU8250 COMPREHENSIVE METABOLIC PANEL: Performed by: CLINICAL MEDICAL LABORATORY

## 2020-12-25 LAB
ANION GAP SERPL CALC-SCNC: 15 MMOL/L (ref 10–20)
BUN SERPL-MCNC: 10 MG/DL (ref 6–20)
BUN/CREAT SERPL: 21 (ref 7–25)
CALCIUM SERPL-MCNC: 8.8 MG/DL (ref 8.4–10.2)
CHLORIDE SERPL-SCNC: 101 MMOL/L (ref 98–107)
CO2 SERPL-SCNC: 26 MMOL/L (ref 21–32)
CREAT SERPL-MCNC: 0.47 MG/DL (ref 0.51–0.95)
DEPRECATED RDW RBC: 48.3 FL (ref 39–50)
ERYTHROCYTE [DISTWIDTH] IN BLOOD: 14.8 % (ref 11–15)
FASTING DURATION TIME PATIENT: ABNORMAL H
GFR SERPLBLD BASED ON 1.73 SQ M-ARVRAT: >90 ML/MIN/1.73M2
GLUCOSE SERPL-MCNC: 91 MG/DL (ref 65–99)
HCT VFR BLD CALC: 30.6 % (ref 36–46.5)
HGB BLD-MCNC: 9.9 G/DL (ref 12–15.5)
MAGNESIUM SERPL-MCNC: 1.9 MG/DL (ref 1.7–2.4)
MCH RBC QN AUTO: 28.9 PG (ref 26–34)
MCHC RBC AUTO-ENTMCNC: 32.4 G/DL (ref 32–36.5)
MCV RBC AUTO: 89.2 FL (ref 78–100)
NRBC BLD MANUAL-RTO: 0 /100 WBC
PHOSPHATE SERPL-MCNC: 5 MG/DL (ref 2.4–4.7)
PLATELET # BLD AUTO: 304 K/MCL (ref 140–450)
POTASSIUM SERPL-SCNC: 3.9 MMOL/L (ref 3.4–5.1)
RBC # BLD: 3.43 MIL/MCL (ref 4–5.2)
SODIUM SERPL-SCNC: 138 MMOL/L (ref 135–145)
WBC # BLD: 6.4 K/MCL (ref 4.2–11)

## 2020-12-25 PROCEDURE — 85027 COMPLETE CBC AUTOMATED: CPT | Performed by: CLINICAL MEDICAL LABORATORY

## 2020-12-25 PROCEDURE — PSEU8274 MAGNESIUM: Performed by: CLINICAL MEDICAL LABORATORY

## 2020-12-25 PROCEDURE — PSEU10425 CBC NO DIFFERENTIAL (PERFORMABLE ONLY): Performed by: CLINICAL MEDICAL LABORATORY

## 2020-12-25 PROCEDURE — 80048 BASIC METABOLIC PNL TOTAL CA: CPT | Performed by: CLINICAL MEDICAL LABORATORY

## 2020-12-25 PROCEDURE — 83735 ASSAY OF MAGNESIUM: CPT | Performed by: CLINICAL MEDICAL LABORATORY

## 2020-12-25 PROCEDURE — PSEU8279 PHOSPHORUS: Performed by: CLINICAL MEDICAL LABORATORY

## 2020-12-25 PROCEDURE — PSEU8235 BASIC METABOLIC PANEL: Performed by: CLINICAL MEDICAL LABORATORY

## 2020-12-25 PROCEDURE — 84100 ASSAY OF PHOSPHORUS: CPT | Performed by: CLINICAL MEDICAL LABORATORY

## 2020-12-29 LAB
ALBUMIN SERPL-MCNC: 3 G/DL (ref 3.6–5.1)
ALBUMIN/GLOB SERPL: 0.7 {RATIO} (ref 1–2.4)
ALP SERPL-CCNC: 100 UNITS/L (ref 45–117)
ALT SERPL-CCNC: 15 UNITS/L
ANION GAP SERPL CALC-SCNC: 14 MMOL/L (ref 10–20)
AST SERPL-CCNC: 13 UNITS/L
BILIRUB SERPL-MCNC: 0.6 MG/DL (ref 0.2–1)
BUN SERPL-MCNC: 8 MG/DL (ref 6–20)
BUN/CREAT SERPL: 14 (ref 7–25)
CALCIUM SERPL-MCNC: 8.9 MG/DL (ref 8.4–10.2)
CHLORIDE SERPL-SCNC: 102 MMOL/L (ref 98–107)
CO2 SERPL-SCNC: 26 MMOL/L (ref 21–32)
CREAT SERPL-MCNC: 0.57 MG/DL (ref 0.51–0.95)
DEPRECATED RDW RBC: 47.2 FL (ref 39–50)
ERYTHROCYTE [DISTWIDTH] IN BLOOD: 14.7 % (ref 11–15)
FASTING DURATION TIME PATIENT: ABNORMAL H
GFR SERPLBLD BASED ON 1.73 SQ M-ARVRAT: >90 ML/MIN/1.73M2
GLOBULIN SER-MCNC: 4.2 G/DL (ref 2–4)
GLUCOSE SERPL-MCNC: 173 MG/DL (ref 65–99)
HCT VFR BLD CALC: 30.1 % (ref 36–46.5)
HGB BLD-MCNC: 9.9 G/DL (ref 12–15.5)
MAGNESIUM SERPL-MCNC: 1.9 MG/DL (ref 1.7–2.4)
MCH RBC QN AUTO: 28.8 PG (ref 26–34)
MCHC RBC AUTO-ENTMCNC: 32.9 G/DL (ref 32–36.5)
MCV RBC AUTO: 87.5 FL (ref 78–100)
NRBC BLD MANUAL-RTO: 0 /100 WBC
PHOSPHATE SERPL-MCNC: 4.3 MG/DL (ref 2.4–4.7)
PLATELET # BLD AUTO: 284 K/MCL (ref 140–450)
POTASSIUM SERPL-SCNC: 3.7 MMOL/L (ref 3.4–5.1)
PROT SERPL-MCNC: 7.2 G/DL (ref 6.4–8.2)
RBC # BLD: 3.44 MIL/MCL (ref 4–5.2)
SODIUM SERPL-SCNC: 138 MMOL/L (ref 135–145)
WBC # BLD: 6.5 K/MCL (ref 4.2–11)

## 2020-12-29 PROCEDURE — 83735 ASSAY OF MAGNESIUM: CPT | Performed by: CLINICAL MEDICAL LABORATORY

## 2020-12-29 PROCEDURE — PSEU8274 MAGNESIUM: Performed by: CLINICAL MEDICAL LABORATORY

## 2020-12-29 PROCEDURE — PSEU8279 PHOSPHORUS: Performed by: CLINICAL MEDICAL LABORATORY

## 2020-12-29 PROCEDURE — 80053 COMPREHEN METABOLIC PANEL: CPT | Performed by: CLINICAL MEDICAL LABORATORY

## 2020-12-29 PROCEDURE — 84100 ASSAY OF PHOSPHORUS: CPT | Performed by: CLINICAL MEDICAL LABORATORY

## 2020-12-29 PROCEDURE — PSEU8250 COMPREHENSIVE METABOLIC PANEL: Performed by: CLINICAL MEDICAL LABORATORY

## 2020-12-29 PROCEDURE — PSEU10425 CBC NO DIFFERENTIAL (PERFORMABLE ONLY): Performed by: CLINICAL MEDICAL LABORATORY

## 2020-12-29 PROCEDURE — 85027 COMPLETE CBC AUTOMATED: CPT | Performed by: CLINICAL MEDICAL LABORATORY

## 2021-01-01 LAB
ANION GAP SERPL CALC-SCNC: 15 MMOL/L (ref 10–20)
BUN SERPL-MCNC: 10 MG/DL (ref 6–20)
BUN/CREAT SERPL: 20 (ref 7–25)
CALCIUM SERPL-MCNC: 8.8 MG/DL (ref 8.4–10.2)
CHLORIDE SERPL-SCNC: 98 MMOL/L (ref 98–107)
CO2 SERPL-SCNC: 26 MMOL/L (ref 21–32)
CREAT SERPL-MCNC: 0.51 MG/DL (ref 0.51–0.95)
DEPRECATED RDW RBC: 47.2 FL (ref 39–50)
ERYTHROCYTE [DISTWIDTH] IN BLOOD: 14.7 % (ref 11–15)
FASTING DURATION TIME PATIENT: ABNORMAL H
GFR SERPLBLD BASED ON 1.73 SQ M-ARVRAT: >90 ML/MIN/1.73M2
GLUCOSE SERPL-MCNC: 103 MG/DL (ref 65–99)
HCT VFR BLD CALC: 32.3 % (ref 36–46.5)
HGB BLD-MCNC: 10.5 G/DL (ref 12–15.5)
MAGNESIUM SERPL-MCNC: 1.7 MG/DL (ref 1.7–2.4)
MCH RBC QN AUTO: 28.7 PG (ref 26–34)
MCHC RBC AUTO-ENTMCNC: 32.5 G/DL (ref 32–36.5)
MCV RBC AUTO: 88.3 FL (ref 78–100)
NRBC BLD MANUAL-RTO: 0 /100 WBC
PHOSPHATE SERPL-MCNC: 4.1 MG/DL (ref 2.4–4.7)
PLATELET # BLD AUTO: 293 K/MCL (ref 140–450)
POTASSIUM SERPL-SCNC: 3.1 MMOL/L (ref 3.4–5.1)
RBC # BLD: 3.66 MIL/MCL (ref 4–5.2)
SODIUM SERPL-SCNC: 136 MMOL/L (ref 135–145)
WBC # BLD: 6.3 K/MCL (ref 4.2–11)

## 2021-01-01 PROCEDURE — PSEU8274 MAGNESIUM: Performed by: CLINICAL MEDICAL LABORATORY

## 2021-01-01 PROCEDURE — 84100 ASSAY OF PHOSPHORUS: CPT | Performed by: CLINICAL MEDICAL LABORATORY

## 2021-01-01 PROCEDURE — 85027 COMPLETE CBC AUTOMATED: CPT | Performed by: CLINICAL MEDICAL LABORATORY

## 2021-01-01 PROCEDURE — 83735 ASSAY OF MAGNESIUM: CPT | Performed by: CLINICAL MEDICAL LABORATORY

## 2021-01-01 PROCEDURE — PSEU8235 BASIC METABOLIC PANEL: Performed by: CLINICAL MEDICAL LABORATORY

## 2021-01-01 PROCEDURE — 80048 BASIC METABOLIC PNL TOTAL CA: CPT | Performed by: CLINICAL MEDICAL LABORATORY

## 2021-01-01 PROCEDURE — PSEU8279 PHOSPHORUS: Performed by: CLINICAL MEDICAL LABORATORY

## 2021-01-01 PROCEDURE — PSEU10425 CBC NO DIFFERENTIAL (PERFORMABLE ONLY): Performed by: CLINICAL MEDICAL LABORATORY

## 2021-01-05 LAB
ALBUMIN SERPL-MCNC: 3 G/DL (ref 3.6–5.1)
ALBUMIN/GLOB SERPL: 0.8 {RATIO} (ref 1–2.4)
ALP SERPL-CCNC: 89 UNITS/L (ref 45–117)
ALT SERPL-CCNC: 17 UNITS/L
ANION GAP SERPL CALC-SCNC: 13 MMOL/L (ref 10–20)
AST SERPL-CCNC: 15 UNITS/L
BILIRUB SERPL-MCNC: 0.5 MG/DL (ref 0.2–1)
BUN SERPL-MCNC: 7 MG/DL (ref 6–20)
BUN/CREAT SERPL: 12 (ref 7–25)
CALCIUM SERPL-MCNC: 9.2 MG/DL (ref 8.4–10.2)
CHLORIDE SERPL-SCNC: 103 MMOL/L (ref 98–107)
CO2 SERPL-SCNC: 26 MMOL/L (ref 21–32)
CREAT SERPL-MCNC: 0.6 MG/DL (ref 0.51–0.95)
DEPRECATED RDW RBC: 47.4 FL (ref 39–50)
ERYTHROCYTE [DISTWIDTH] IN BLOOD: 14.7 % (ref 11–15)
FASTING DURATION TIME PATIENT: ABNORMAL H
GFR SERPLBLD BASED ON 1.73 SQ M-ARVRAT: >90 ML/MIN/1.73M2
GLOBULIN SER-MCNC: 4 G/DL (ref 2–4)
GLUCOSE SERPL-MCNC: 101 MG/DL (ref 65–99)
HCT VFR BLD CALC: 34.7 % (ref 36–46.5)
HGB BLD-MCNC: 11.5 G/DL (ref 12–15.5)
MAGNESIUM SERPL-MCNC: 1.7 MG/DL (ref 1.7–2.4)
MCH RBC QN AUTO: 29 PG (ref 26–34)
MCHC RBC AUTO-ENTMCNC: 33.1 G/DL (ref 32–36.5)
MCV RBC AUTO: 87.4 FL (ref 78–100)
NRBC BLD MANUAL-RTO: 0 /100 WBC
PHOSPHATE SERPL-MCNC: 4.1 MG/DL (ref 2.4–4.7)
PLATELET # BLD AUTO: 276 K/MCL (ref 140–450)
POTASSIUM SERPL-SCNC: 3.2 MMOL/L (ref 3.4–5.1)
PROT SERPL-MCNC: 7 G/DL (ref 6.4–8.2)
RBC # BLD: 3.97 MIL/MCL (ref 4–5.2)
SODIUM SERPL-SCNC: 139 MMOL/L (ref 135–145)
WBC # BLD: 6.1 K/MCL (ref 4.2–11)

## 2021-01-05 PROCEDURE — 84100 ASSAY OF PHOSPHORUS: CPT | Performed by: CLINICAL MEDICAL LABORATORY

## 2021-01-05 PROCEDURE — PSEU10425 CBC NO DIFFERENTIAL (PERFORMABLE ONLY): Performed by: CLINICAL MEDICAL LABORATORY

## 2021-01-05 PROCEDURE — 83735 ASSAY OF MAGNESIUM: CPT | Performed by: CLINICAL MEDICAL LABORATORY

## 2021-01-05 PROCEDURE — 80053 COMPREHEN METABOLIC PANEL: CPT | Performed by: CLINICAL MEDICAL LABORATORY

## 2021-01-05 PROCEDURE — PSEU8274 MAGNESIUM: Performed by: CLINICAL MEDICAL LABORATORY

## 2021-01-05 PROCEDURE — 85027 COMPLETE CBC AUTOMATED: CPT | Performed by: CLINICAL MEDICAL LABORATORY

## 2021-01-05 PROCEDURE — PSEU8250 COMPREHENSIVE METABOLIC PANEL: Performed by: CLINICAL MEDICAL LABORATORY

## 2021-01-05 PROCEDURE — PSEU8279 PHOSPHORUS: Performed by: CLINICAL MEDICAL LABORATORY

## 2021-01-06 LAB
ANION GAP SERPL CALC-SCNC: 14 MMOL/L (ref 10–20)
BUN SERPL-MCNC: 10 MG/DL (ref 6–20)
BUN/CREAT SERPL: 13 (ref 7–25)
CALCIUM SERPL-MCNC: 9.4 MG/DL (ref 8.4–10.2)
CHLORIDE SERPL-SCNC: 100 MMOL/L (ref 98–107)
CO2 SERPL-SCNC: 27 MMOL/L (ref 21–32)
CREAT SERPL-MCNC: 0.79 MG/DL (ref 0.51–0.95)
FASTING DURATION TIME PATIENT: ABNORMAL H
GFR SERPLBLD BASED ON 1.73 SQ M-ARVRAT: 86 ML/MIN/1.73M2
GLUCOSE SERPL-MCNC: 98 MG/DL (ref 65–99)
POTASSIUM SERPL-SCNC: 3.7 MMOL/L (ref 3.4–5.1)
SODIUM SERPL-SCNC: 137 MMOL/L (ref 135–145)

## 2021-01-06 PROCEDURE — PSEU8235 BASIC METABOLIC PANEL: Performed by: CLINICAL MEDICAL LABORATORY

## 2021-01-06 PROCEDURE — 80048 BASIC METABOLIC PNL TOTAL CA: CPT | Performed by: CLINICAL MEDICAL LABORATORY

## 2021-02-12 ENCOUNTER — OFFICE VISIT (OUTPATIENT)
Dept: INTERNAL MEDICINE CLINIC | Facility: CLINIC | Age: 56
End: 2021-02-12
Payer: COMMERCIAL

## 2021-02-12 ENCOUNTER — MED REC SCAN ONLY (OUTPATIENT)
Dept: INTERNAL MEDICINE CLINIC | Facility: CLINIC | Age: 56
End: 2021-02-12

## 2021-02-12 VITALS
BODY MASS INDEX: 27.49 KG/M2 | SYSTOLIC BLOOD PRESSURE: 129 MMHG | WEIGHT: 161 LBS | HEART RATE: 97 BPM | DIASTOLIC BLOOD PRESSURE: 88 MMHG | OXYGEN SATURATION: 99 % | HEIGHT: 64 IN

## 2021-02-12 DIAGNOSIS — K59.09 OTHER CONSTIPATION: ICD-10-CM

## 2021-02-12 DIAGNOSIS — E55.9 VITAMIN D DEFICIENCY: ICD-10-CM

## 2021-02-12 DIAGNOSIS — K21.9 GASTROESOPHAGEAL REFLUX DISEASE WITHOUT ESOPHAGITIS: ICD-10-CM

## 2021-02-12 DIAGNOSIS — I10 ESSENTIAL HYPERTENSION: ICD-10-CM

## 2021-02-12 DIAGNOSIS — R09.02 HYPOXIA: ICD-10-CM

## 2021-02-12 DIAGNOSIS — Z86.16 HISTORY OF COVID-19: Primary | ICD-10-CM

## 2021-02-12 DIAGNOSIS — I10 ESSENTIAL HYPERTENSION WITH GOAL BLOOD PRESSURE LESS THAN 130/80: ICD-10-CM

## 2021-02-12 DIAGNOSIS — K86.9 PANCREATIC LESION: ICD-10-CM

## 2021-02-12 DIAGNOSIS — E11.9 TYPE 2 DIABETES MELLITUS WITHOUT COMPLICATION, WITHOUT LONG-TERM CURRENT USE OF INSULIN (HCC): ICD-10-CM

## 2021-02-12 PROCEDURE — 3074F SYST BP LT 130 MM HG: CPT | Performed by: INTERNAL MEDICINE

## 2021-02-12 PROCEDURE — 3079F DIAST BP 80-89 MM HG: CPT | Performed by: INTERNAL MEDICINE

## 2021-02-12 PROCEDURE — 3008F BODY MASS INDEX DOCD: CPT | Performed by: INTERNAL MEDICINE

## 2021-02-12 PROCEDURE — 99204 OFFICE O/P NEW MOD 45 MIN: CPT | Performed by: INTERNAL MEDICINE

## 2021-02-12 RX ORDER — MECLIZINE HCL 12.5 MG/1
12.5 TABLET ORAL 3 TIMES DAILY PRN
Qty: 30 TABLET | Refills: 0 | Status: SHIPPED | OUTPATIENT
Start: 2021-02-12 | End: 2021-04-23 | Stop reason: ALTCHOICE

## 2021-02-12 RX ORDER — GLIMEPIRIDE 1 MG/1
1 TABLET ORAL DAILY
COMMUNITY
Start: 2021-01-20 | End: 2021-02-19

## 2021-02-12 RX ORDER — SENNOSIDES 8.6 MG
2 TABLET ORAL DAILY
COMMUNITY
Start: 2021-01-20 | End: 2021-08-04 | Stop reason: ALTCHOICE

## 2021-02-12 RX ORDER — GABAPENTIN 300 MG/1
CAPSULE ORAL
COMMUNITY
Start: 2021-01-20 | End: 2021-03-09

## 2021-02-12 RX ORDER — LANCETS
EACH MISCELLANEOUS
COMMUNITY
Start: 2021-01-28 | End: 2021-05-03

## 2021-02-12 RX ORDER — VITAMIN B COMPLEX
TABLET ORAL
COMMUNITY
Start: 2021-01-20 | End: 2021-04-23 | Stop reason: ALTCHOICE

## 2021-02-12 RX ORDER — BLOOD-GLUCOSE METER
EACH MISCELLANEOUS
COMMUNITY
Start: 2021-01-28

## 2021-02-12 RX ORDER — BLOOD SUGAR DIAGNOSTIC
STRIP MISCELLANEOUS
COMMUNITY
Start: 2021-01-28 | End: 2021-05-03

## 2021-02-12 RX ORDER — OMEPRAZOLE 20 MG/1
CAPSULE, DELAYED RELEASE ORAL
COMMUNITY
Start: 2021-01-20 | End: 2021-02-19

## 2021-02-12 NOTE — PROGRESS NOTES
HPI:    Patient ID: Amanda Vela is a 50year old female. Patient presents with:  Hospital F/U: Stts she was hospitalized at [facility] on 10/28/20 and was dx with COVID, and was discharged in December 2020.      was hospitalized at constipation, diarrhea, nausea and vomiting. Genitourinary: Negative for dysuria and frequency. Skin: Negative for pallor. Neurological: Positive for vertigo. Negative for dizziness, syncope, numbness and headaches.         Vertigo splinting sensation tenderness. Eyes: Right eye exhibits no discharge. Left eye exhibits no discharge. No scleral icterus. Neck: Neck supple. No JVD present. No thyromegaly present. Cardiovascular: Normal rate, regular rhythm and normal heart sounds. No murmur heard. improved. S/P Trach and PEG on 11/30. Antibiotics stopped. She was weaned off sedation and onto pressure support.   -was on Eliquis 2.5 mg BID dosing for 4 weeks for DVT prophylaxis.   Was on Bactrim x 2 weeks for PJP prophylaxis due to heavy steroid use  - Rest   avoid quick movements  Side effects and directions of medication discussed with patient. Patient verbalized understanding and compliance.        Pancreatic lesion/cyst    Refer to gastroenterology   Likely Need outpatient EUS          Jaiden Garcia

## 2021-02-16 DIAGNOSIS — E11.9 TYPE 2 DIABETES MELLITUS WITHOUT COMPLICATION, WITHOUT LONG-TERM CURRENT USE OF INSULIN (HCC): ICD-10-CM

## 2021-02-17 NOTE — TELEPHONE ENCOUNTER
Faxed received at [address] [place] [address] office regarding disability benefits. Forms emailed to the forms dept.

## 2021-02-19 DIAGNOSIS — K21.9 GASTROESOPHAGEAL REFLUX DISEASE WITHOUT ESOPHAGITIS: ICD-10-CM

## 2021-02-19 DIAGNOSIS — E11.9 TYPE 2 DIABETES MELLITUS WITHOUT COMPLICATION, WITHOUT LONG-TERM CURRENT USE OF INSULIN (HCC): ICD-10-CM

## 2021-02-21 RX ORDER — GLIMEPIRIDE 1 MG/1
1 TABLET ORAL DAILY
Qty: 90 TABLET | Refills: 0 | Status: SHIPPED | OUTPATIENT
Start: 2021-02-21 | End: 2021-04-23 | Stop reason: ALTCHOICE

## 2021-02-21 RX ORDER — OMEPRAZOLE 20 MG/1
CAPSULE, DELAYED RELEASE ORAL
Qty: 90 CAPSULE | Refills: 0 | Status: SHIPPED | OUTPATIENT
Start: 2021-02-21 | End: 2021-05-22

## 2021-02-24 NOTE — TELEPHONE ENCOUNTER
Advised pt of need for labs prior to vitamin D refill. Please advise on added, pended vitaminD level.

## 2021-03-02 ENCOUNTER — LAB ENCOUNTER (OUTPATIENT)
Dept: LAB | Age: 56
End: 2021-03-02
Attending: INTERNAL MEDICINE
Payer: COMMERCIAL

## 2021-03-02 DIAGNOSIS — E11.9 TYPE 2 DIABETES MELLITUS WITHOUT COMPLICATION, WITHOUT LONG-TERM CURRENT USE OF INSULIN (HCC): ICD-10-CM

## 2021-03-02 LAB
ALBUMIN SERPL-MCNC: 3.8 G/DL (ref 3.4–5)
ALBUMIN/GLOB SERPL: 1 {RATIO} (ref 1–2)
ALP LIVER SERPL-CCNC: 50 U/L
ALT SERPL-CCNC: 12 U/L
ANION GAP SERPL CALC-SCNC: 6 MMOL/L (ref 0–18)
AST SERPL-CCNC: 11 U/L (ref 15–37)
BASOPHILS # BLD AUTO: 0.06 X10(3) UL (ref 0–0.2)
BASOPHILS NFR BLD AUTO: 1 %
BILIRUB SERPL-MCNC: 0.9 MG/DL (ref 0.1–2)
BILIRUB UR QL: NEGATIVE
BUN BLD-MCNC: 17 MG/DL (ref 7–18)
BUN/CREAT SERPL: 19.3 (ref 10–20)
CALCIUM BLD-MCNC: 9.5 MG/DL (ref 8.5–10.1)
CHLORIDE SERPL-SCNC: 106 MMOL/L (ref 98–112)
CO2 SERPL-SCNC: 29 MMOL/L (ref 21–32)
COLOR UR: YELLOW
CREAT BLD-MCNC: 0.88 MG/DL
CREAT UR-SCNC: 295 MG/DL
DEPRECATED RDW RBC AUTO: 41.7 FL (ref 35.1–46.3)
EOSINOPHIL # BLD AUTO: 0.48 X10(3) UL (ref 0–0.7)
EOSINOPHIL NFR BLD AUTO: 8 %
ERYTHROCYTE [DISTWIDTH] IN BLOOD BY AUTOMATED COUNT: 13.1 % (ref 11–15)
EST. AVERAGE GLUCOSE BLD GHB EST-MCNC: 128 MG/DL (ref 68–126)
GLOBULIN PLAS-MCNC: 3.7 G/DL (ref 2.8–4.4)
GLUCOSE BLD-MCNC: 98 MG/DL (ref 70–99)
GLUCOSE UR-MCNC: NEGATIVE MG/DL
HBA1C MFR BLD HPLC: 6.1 % (ref ?–5.7)
HCT VFR BLD AUTO: 40.1 %
HGB BLD-MCNC: 13.1 G/DL
HGB UR QL STRIP.AUTO: NEGATIVE
HYALINE CASTS #/AREA URNS AUTO: PRESENT /LPF
IMM GRANULOCYTES # BLD AUTO: 0.01 X10(3) UL (ref 0–1)
IMM GRANULOCYTES NFR BLD: 0.2 %
KETONES UR-MCNC: NEGATIVE MG/DL
LYMPHOCYTES # BLD AUTO: 1.79 X10(3) UL (ref 1–4)
LYMPHOCYTES NFR BLD AUTO: 29.7 %
M PROTEIN MFR SERPL ELPH: 7.5 G/DL (ref 6.4–8.2)
MCH RBC QN AUTO: 28.7 PG (ref 26–34)
MCHC RBC AUTO-ENTMCNC: 32.7 G/DL (ref 31–37)
MCV RBC AUTO: 87.9 FL
MICROALBUMIN UR-MCNC: 4.41 MG/DL
MICROALBUMIN/CREAT 24H UR-RTO: 14.9 UG/MG (ref ?–30)
MONOCYTES # BLD AUTO: 0.31 X10(3) UL (ref 0.1–1)
MONOCYTES NFR BLD AUTO: 5.1 %
NEUTROPHILS # BLD AUTO: 3.37 X10 (3) UL (ref 1.5–7.7)
NEUTROPHILS # BLD AUTO: 3.37 X10(3) UL (ref 1.5–7.7)
NEUTROPHILS NFR BLD AUTO: 56 %
NITRITE UR QL STRIP.AUTO: NEGATIVE
OSMOLALITY SERPL CALC.SUM OF ELEC: 294 MOSM/KG (ref 275–295)
PATIENT FASTING Y/N/NP: YES
PH UR: 5 [PH] (ref 5–8)
PLATELET # BLD AUTO: 228 10(3)UL (ref 150–450)
POTASSIUM SERPL-SCNC: 3.9 MMOL/L (ref 3.5–5.1)
PROT UR-MCNC: NEGATIVE MG/DL
RBC # BLD AUTO: 4.56 X10(6)UL
SODIUM SERPL-SCNC: 141 MMOL/L (ref 136–145)
SP GR UR STRIP: 1.02 (ref 1–1.03)
TSI SER-ACNC: 3.19 MIU/ML (ref 0.36–3.74)
UROBILINOGEN UR STRIP-ACNC: <2
WBC # BLD AUTO: 6 X10(3) UL (ref 4–11)

## 2021-03-02 PROCEDURE — 3061F NEG MICROALBUMINURIA REV: CPT | Performed by: INTERNAL MEDICINE

## 2021-03-02 PROCEDURE — 82043 UR ALBUMIN QUANTITATIVE: CPT

## 2021-03-02 PROCEDURE — 81001 URINALYSIS AUTO W/SCOPE: CPT | Performed by: INTERNAL MEDICINE

## 2021-03-02 PROCEDURE — 36415 COLL VENOUS BLD VENIPUNCTURE: CPT

## 2021-03-02 PROCEDURE — 87086 URINE CULTURE/COLONY COUNT: CPT | Performed by: INTERNAL MEDICINE

## 2021-03-02 PROCEDURE — 87077 CULTURE AEROBIC IDENTIFY: CPT | Performed by: INTERNAL MEDICINE

## 2021-03-02 PROCEDURE — 83036 HEMOGLOBIN GLYCOSYLATED A1C: CPT

## 2021-03-02 PROCEDURE — 84443 ASSAY THYROID STIM HORMONE: CPT

## 2021-03-02 PROCEDURE — 80053 COMPREHEN METABOLIC PANEL: CPT

## 2021-03-02 PROCEDURE — 85025 COMPLETE CBC W/AUTO DIFF WBC: CPT

## 2021-03-02 PROCEDURE — 82570 ASSAY OF URINE CREATININE: CPT

## 2021-03-02 PROCEDURE — 87186 SC STD MICRODIL/AGAR DIL: CPT | Performed by: INTERNAL MEDICINE

## 2021-03-04 ENCOUNTER — TELEPHONE (OUTPATIENT)
Dept: INTERNAL MEDICINE CLINIC | Facility: CLINIC | Age: 56
End: 2021-03-04

## 2021-03-04 RX ORDER — SULFAMETHOXAZOLE AND TRIMETHOPRIM 800; 160 MG/1; MG/1
1 TABLET ORAL 2 TIMES DAILY
Qty: 14 TABLET | Refills: 0 | Status: SHIPPED | OUTPATIENT
Start: 2021-03-04 | End: 2021-03-11

## 2021-03-09 ENCOUNTER — OFFICE VISIT (OUTPATIENT)
Dept: INTERNAL MEDICINE CLINIC | Facility: CLINIC | Age: 56
End: 2021-03-09
Payer: COMMERCIAL

## 2021-03-09 VITALS
HEIGHT: 64 IN | SYSTOLIC BLOOD PRESSURE: 126 MMHG | BODY MASS INDEX: 26.12 KG/M2 | OXYGEN SATURATION: 96 % | DIASTOLIC BLOOD PRESSURE: 83 MMHG | WEIGHT: 153 LBS | HEART RATE: 86 BPM

## 2021-03-09 DIAGNOSIS — M25.561 ARTHRALGIA OF BOTH LOWER LEGS: ICD-10-CM

## 2021-03-09 DIAGNOSIS — G62.9 NEUROPATHY: ICD-10-CM

## 2021-03-09 DIAGNOSIS — E55.9 VITAMIN D DEFICIENCY: ICD-10-CM

## 2021-03-09 DIAGNOSIS — M54.42 CHRONIC LEFT-SIDED LOW BACK PAIN WITH BILATERAL SCIATICA: Primary | ICD-10-CM

## 2021-03-09 DIAGNOSIS — M25.562 ARTHRALGIA OF BOTH LOWER LEGS: ICD-10-CM

## 2021-03-09 DIAGNOSIS — M54.41 CHRONIC LEFT-SIDED LOW BACK PAIN WITH BILATERAL SCIATICA: Primary | ICD-10-CM

## 2021-03-09 DIAGNOSIS — G89.29 CHRONIC LEFT-SIDED LOW BACK PAIN WITH BILATERAL SCIATICA: Primary | ICD-10-CM

## 2021-03-09 PROCEDURE — 3079F DIAST BP 80-89 MM HG: CPT | Performed by: INTERNAL MEDICINE

## 2021-03-09 PROCEDURE — 3074F SYST BP LT 130 MM HG: CPT | Performed by: INTERNAL MEDICINE

## 2021-03-09 PROCEDURE — 3008F BODY MASS INDEX DOCD: CPT | Performed by: INTERNAL MEDICINE

## 2021-03-09 PROCEDURE — 99214 OFFICE O/P EST MOD 30 MIN: CPT | Performed by: INTERNAL MEDICINE

## 2021-03-09 RX ORDER — GABAPENTIN 300 MG/1
CAPSULE ORAL
Qty: 60 CAPSULE | Refills: 0 | Status: SHIPPED | OUTPATIENT
Start: 2021-03-09 | End: 2021-04-09

## 2021-03-09 NOTE — PROGRESS NOTES
HPI:    Patient ID: Nallely Green is a 50year old female. Patient presents with:  Patient presents with: Follow - Up  Leg Pain: Still has some pain in both legs that are radiating from the calf downward. Pain is waking her up at night.     Previ inability to bear weight or joint swelling. The symptoms are aggravated by activity (wit too much  PT ). She has tried rest (gabapentin 300 mg  qhs ) for the symptoms. The treatment provided significant relief.         Review of Systems   HENT: Negative for • Meclizine HCl 12.5 MG Oral Tab Take 1 tablet (12.5 mg total) by mouth 3 (three) times daily as needed (for vertigo).  (Patient not taking: Reported on 3/9/2021 ) 30 tablet 0     Allergies:No Known Allergies   PHYSICAL EXAM:   Physical Exam  Vitals and cedric improving     Psychiatric:         Behavior: Behavior normal.       Blood pressure 126/83, pulse 107, height 5' 4\" (1.626 m), weight 153 lb (69.4 kg), last menstrual period 01/04/2021, SpO2 96 %. ASSESSMENT/PLAN:     Lower back pain  1.   physical therapy with the [facility] 3-4 times per week  Patient to continue present management she is doing much better  At this time     Labs discussed with patient       Pancreatic Cystic Mass  -Likely to require outpatient EUS  Has apt  With ga Vitamin B12 [E]      Vitamin D, 30-[id number]      Folic Acid Serum [E]      Meds This Visit:  Requested Prescriptions     Signed Prescriptions Disp Refills   • gabapentin 300 MG Oral Cap 60 capsule 0     Sig: Take 1-2  Cap at night       Imaging & Referrals:

## 2021-03-12 ENCOUNTER — LAB ENCOUNTER (OUTPATIENT)
Dept: LAB | Age: 56
End: 2021-03-12
Attending: INTERNAL MEDICINE
Payer: COMMERCIAL

## 2021-03-12 ENCOUNTER — HOSPITAL ENCOUNTER (OUTPATIENT)
Dept: GENERAL RADIOLOGY | Age: 56
Discharge: HOME | End: 2021-03-12
Attending: INTERNAL MEDICINE
Payer: COMMERCIAL

## 2021-03-12 DIAGNOSIS — G89.29 CHRONIC LEFT-SIDED LOW BACK PAIN WITH BILATERAL SCIATICA: ICD-10-CM

## 2021-03-12 DIAGNOSIS — M25.562 ARTHRALGIA OF BOTH LOWER LEGS: ICD-10-CM

## 2021-03-12 DIAGNOSIS — M25.561 ARTHRALGIA OF BOTH LOWER LEGS: ICD-10-CM

## 2021-03-12 DIAGNOSIS — M54.41 CHRONIC LEFT-SIDED LOW BACK PAIN WITH BILATERAL SCIATICA: ICD-10-CM

## 2021-03-12 DIAGNOSIS — M54.42 CHRONIC LEFT-SIDED LOW BACK PAIN WITH BILATERAL SCIATICA: ICD-10-CM

## 2021-03-12 DIAGNOSIS — E55.9 VITAMIN D DEFICIENCY: ICD-10-CM

## 2021-03-12 LAB
FOLATE SERPL-MCNC: 11.8 NG/ML (ref 8.7–?)
VIT B12 SERPL-MCNC: 323 PG/ML (ref 193–986)

## 2021-03-12 PROCEDURE — 82607 VITAMIN B-12: CPT

## 2021-03-12 PROCEDURE — 82746 ASSAY OF FOLIC ACID SERUM: CPT

## 2021-03-12 PROCEDURE — 36415 COLL VENOUS BLD VENIPUNCTURE: CPT

## 2021-03-12 PROCEDURE — 82306 VITAMIN D 25 HYDROXY: CPT

## 2021-03-12 PROCEDURE — 72110 X-RAY EXAM L-2 SPINE 4/>VWS: CPT | Performed by: INTERNAL MEDICINE

## 2021-03-15 LAB — 25(OH)D3 SERPL-MCNC: 33.5 NG/ML (ref 30–100)

## 2021-03-19 ENCOUNTER — OFFICE VISIT (OUTPATIENT)
Dept: PULMONOLOGY | Facility: CLINIC | Age: 56
End: 2021-03-19
Payer: COMMERCIAL

## 2021-03-19 ENCOUNTER — HOSPITAL ENCOUNTER (OUTPATIENT)
Dept: GENERAL RADIOLOGY | Age: 56
Discharge: HOME | End: 2021-03-19
Attending: INTERNAL MEDICINE
Payer: COMMERCIAL

## 2021-03-19 VITALS
RESPIRATION RATE: 18 BRPM | HEIGHT: 64 IN | BODY MASS INDEX: 25.61 KG/M2 | DIASTOLIC BLOOD PRESSURE: 72 MMHG | HEART RATE: 104 BPM | WEIGHT: 150 LBS | TEMPERATURE: 97 F | OXYGEN SATURATION: 96 % | SYSTOLIC BLOOD PRESSURE: 102 MMHG

## 2021-03-19 DIAGNOSIS — U07.1 COVID-19: ICD-10-CM

## 2021-03-19 DIAGNOSIS — J84.9 ILD (INTERSTITIAL LUNG DISEASE) (HCC): ICD-10-CM

## 2021-03-19 DIAGNOSIS — U07.1 COVID-19: Primary | ICD-10-CM

## 2021-03-19 PROCEDURE — 99244 OFF/OP CNSLTJ NEW/EST MOD 40: CPT | Performed by: INTERNAL MEDICINE

## 2021-03-19 PROCEDURE — 3074F SYST BP LT 130 MM HG: CPT | Performed by: INTERNAL MEDICINE

## 2021-03-19 PROCEDURE — 71046 X-RAY EXAM CHEST 2 VIEWS: CPT | Performed by: INTERNAL MEDICINE

## 2021-03-19 PROCEDURE — 3008F BODY MASS INDEX DOCD: CPT | Performed by: INTERNAL MEDICINE

## 2021-03-19 PROCEDURE — 3078F DIAST BP <80 MM HG: CPT | Performed by: INTERNAL MEDICINE

## 2021-03-19 NOTE — H&P
Referring Physician  Riddhi Garrett MD    Chief Complaint  COVID-19 follow-up    History of Present Illness  Patient is a otherwise healthy 82-year-old female with no significant past medical history who presents after prolonged hospitalization seconda Diabetes Paternal Grandfather         Social History  Tobacco: Denies  Alcohol: Denies significant intake  Illicit Drugs: Denies    Medications  gabapentin 300 MG Oral Cap, Take 1-2  Cap at night, Disp: 60 capsule, Rfl: 0  omeprazole 20 MG Oral Capsule Del seen    Pulmonary Function Testing  None available to review    Assessment  1. Severe COVID-19 pneumonia  2. Dyspnea with exertion    Plan  -Patient presents today for pulmonary evaluation to clinic.   Prolonged hospitalization secondary to severe COVID-1

## 2021-03-22 ENCOUNTER — OFFICE VISIT (OUTPATIENT)
Dept: GASTROENTEROLOGY | Facility: CLINIC | Age: 56
End: 2021-03-22
Payer: COMMERCIAL

## 2021-03-22 ENCOUNTER — TELEPHONE (OUTPATIENT)
Dept: GASTROENTEROLOGY | Facility: CLINIC | Age: 56
End: 2021-03-22

## 2021-03-22 ENCOUNTER — OFFICE VISIT (OUTPATIENT)
Dept: NEUROLOGY | Facility: CLINIC | Age: 56
End: 2021-03-22
Payer: COMMERCIAL

## 2021-03-22 VITALS
WEIGHT: 146.63 LBS | BODY MASS INDEX: 25.03 KG/M2 | HEART RATE: 96 BPM | RESPIRATION RATE: 18 BRPM | SYSTOLIC BLOOD PRESSURE: 119 MMHG | TEMPERATURE: 98 F | DIASTOLIC BLOOD PRESSURE: 84 MMHG | HEIGHT: 64 IN

## 2021-03-22 VITALS
SYSTOLIC BLOOD PRESSURE: 128 MMHG | HEIGHT: 64 IN | WEIGHT: 150 LBS | OXYGEN SATURATION: 98 % | BODY MASS INDEX: 25.61 KG/M2 | HEART RATE: 101 BPM | DIASTOLIC BLOOD PRESSURE: 90 MMHG

## 2021-03-22 DIAGNOSIS — M54.16 BILATERAL LUMBAR RADICULOPATHY: ICD-10-CM

## 2021-03-22 DIAGNOSIS — Z86.16 HISTORY OF 2019 NOVEL CORONAVIRUS DISEASE (COVID-19): Primary | ICD-10-CM

## 2021-03-22 DIAGNOSIS — K86.9 PANCREATIC LESION: ICD-10-CM

## 2021-03-22 DIAGNOSIS — K86.9 PANCREATIC LESION: Primary | ICD-10-CM

## 2021-03-22 DIAGNOSIS — M43.17 SPONDYLOLISTHESIS AT L5-S1 LEVEL: Primary | ICD-10-CM

## 2021-03-22 PROCEDURE — 3074F SYST BP LT 130 MM HG: CPT | Performed by: INTERNAL MEDICINE

## 2021-03-22 PROCEDURE — 99244 OFF/OP CNSLTJ NEW/EST MOD 40: CPT | Performed by: PHYSICAL MEDICINE & REHABILITATION

## 2021-03-22 PROCEDURE — 99244 OFF/OP CNSLTJ NEW/EST MOD 40: CPT | Performed by: INTERNAL MEDICINE

## 2021-03-22 PROCEDURE — 3008F BODY MASS INDEX DOCD: CPT | Performed by: INTERNAL MEDICINE

## 2021-03-22 PROCEDURE — 3079F DIAST BP 80-89 MM HG: CPT | Performed by: INTERNAL MEDICINE

## 2021-03-22 PROCEDURE — 3080F DIAST BP >= 90 MM HG: CPT | Performed by: PHYSICAL MEDICINE & REHABILITATION

## 2021-03-22 PROCEDURE — 3008F BODY MASS INDEX DOCD: CPT | Performed by: PHYSICAL MEDICINE & REHABILITATION

## 2021-03-22 PROCEDURE — 3074F SYST BP LT 130 MM HG: CPT | Performed by: PHYSICAL MEDICINE & REHABILITATION

## 2021-03-22 NOTE — TELEPHONE ENCOUNTER
Keagan Becerra forms were signed by the patient and Allan Boykin and faxed to [facility] for results.

## 2021-03-22 NOTE — H&P
[address] [facility] [address] Gastroenterology                                                                                                  Clinic History and Physical     Pa today's visit):  Current Outpatient Medications   Medication Sig Dispense Refill   • gabapentin 300 MG Oral Cap Take 1-2  Cap at night 60 capsule 0   • omeprazole 20 MG Oral Capsule Delayed Release TAKE 1 CAPSULE BY MOUTH DAILY FOR ACID REFLUX AND HISTORY comfortable and in no acute discomfort  HEENT: the sclera appears anicteric, oropharynx clear, mucus membranes appear moist  CV- regular rate and rhythm, the extremities are warm and well perfused   Lung- Moves air well;  No labored breathing  Abdomen- soft imaging  - Put in recall Call in 2 months to discuss pancreatic imaging and colonoscopy   -Colonoscopy after May/June pending patient symptoms for screening colonoscopy  -Prep: Split dose Colyte or equivalent  -Diabetes meds: Hold metformin day of procedur

## 2021-03-22 NOTE — PATIENT INSTRUCTIONS
-EMG test of both legs   -MRI of the lumbar spine   -Follow up after  -Increase Gabapentin to three times/daily

## 2021-03-22 NOTE — PATIENT INSTRUCTIONS
# Pancreatic lesion  Request records and given recent COVID would wait for further procedures/EUS and would review prior to proceed    # Average Risk screening: patient is considered average risk for colon cancer (denies family hx of colon cancer) and it i

## 2021-03-23 NOTE — TELEPHONE ENCOUNTER
Received confirmation for CADENCE faxed to [facility] for CT scan records from Oct 2020 @ 15:15:14 3/22/2021.   Fax [phone]

## 2021-03-26 ENCOUNTER — TELEPHONE (OUTPATIENT)
Dept: PULMONOLOGY | Facility: CLINIC | Age: 56
End: 2021-03-26

## 2021-03-26 NOTE — TELEPHONE ENCOUNTER
----- Message from Amy Guajardo DO sent at 3/25/2021  3:44 PM CDT -----  You may let the patient know that reviewed her chest x-ray results with some interstitial changes seen within her lung fields which is likely secondary to her COVID-19 history.

## 2021-03-26 NOTE — TELEPHONE ENCOUNTER
I spoke to Mark Mcpherson in medical records and she stated that she faxed 5 pages of records to us on 3/24/21. I asked to Refax and verified our fax number.  Mark Mcpherson agreed to Re-fax

## 2021-03-26 NOTE — TELEPHONE ENCOUNTER
Received CT scan of chest done 10/29/2020 from [facility].  I placed them on Dr Nae Costello desk for review. FYI: Dr Kermit Goldberg results can be seen or uploaded in Care everywhere [facility].

## 2021-03-27 ENCOUNTER — TELEPHONE (OUTPATIENT)
Dept: INTERNAL MEDICINE CLINIC | Facility: CLINIC | Age: 56
End: 2021-03-27

## 2021-03-27 NOTE — TELEPHONE ENCOUNTER
Dear Nubia Trejo,     Your vitamin D is in normal range   folic acid is in normal range   vitamin B12 is mildly decreased you can take vitamin B12 2500 mcg sublingual- under the tongue- daily for 3 to 4 months then will recheck levels        Best regards  Follow

## 2021-03-27 NOTE — TELEPHONE ENCOUNTER
called back regarding a msg for labs, advised other than labs reviewed today there are no other results to review. No other questions.

## 2021-03-31 ENCOUNTER — TELEPHONE (OUTPATIENT)
Dept: PULMONOLOGY | Facility: CLINIC | Age: 56
End: 2021-03-31

## 2021-03-31 NOTE — TELEPHONE ENCOUNTER
Records received from [facility]. Records placed in [facility] folder for review.

## 2021-03-31 NOTE — TELEPHONE ENCOUNTER
Spoke with patient informed her of Dr. Rajat Batista result note/order. Patient verbalized understanding, appointment for CT scheduled for 4/2/21.

## 2021-04-02 ENCOUNTER — HOSPITAL ENCOUNTER (OUTPATIENT)
Dept: CT IMAGING | Age: 56
Discharge: HOME | End: 2021-04-02
Attending: INTERNAL MEDICINE
Payer: COMMERCIAL

## 2021-04-02 DIAGNOSIS — J84.9 ILD (INTERSTITIAL LUNG DISEASE) (HCC): ICD-10-CM

## 2021-04-02 PROCEDURE — 71250 CT THORAX DX C-: CPT | Performed by: INTERNAL MEDICINE

## 2021-04-08 ENCOUNTER — PROCEDURE VISIT (OUTPATIENT)
Dept: NEUROLOGY | Facility: CLINIC | Age: 56
End: 2021-04-08
Payer: COMMERCIAL

## 2021-04-08 DIAGNOSIS — M54.16 BILATERAL LUMBAR RADICULOPATHY: Primary | ICD-10-CM

## 2021-04-08 PROCEDURE — 95911 NRV CNDJ TEST 9-10 STUDIES: CPT | Performed by: PHYSICAL MEDICINE & REHABILITATION

## 2021-04-08 PROCEDURE — 95886 MUSC TEST DONE W/N TEST COMP: CPT | Performed by: PHYSICAL MEDICINE & REHABILITATION

## 2021-04-08 NOTE — PROCEDURES
[address]   Nerve Conduction Study and Electromyography Procedure Note    Patient: Si Lot Hand Dominance:  R   Patient ID: [id number] Referring Dr:      Sex: Female Test Dr:  Gail Bravo   Date of Birth: [birth year] R. Tibialis anterior Deep peroneal (Fibular) L4-L5 N 1+ None None None Normal N N N N   R. Tibialis posterior Tibial L4-L5 N None None None None Normal N N N N   R. Gastrocnemius (Medial head) Tibial S1-S2 N None None None None Normal 5-7 N N N   R.  Perone latency was increased for Lat leg stimulation  o the peak amplitude was reduced for Lat leg stimulation    The H reflex study had results outside of the specified normal range in all 2 of the tested nerves:  • In the L Tibial study  o H latency was increas

## 2021-04-09 RX ORDER — GABAPENTIN 300 MG/1
CAPSULE ORAL
Qty: 60 CAPSULE | Refills: 0 | Status: SHIPPED | OUTPATIENT
Start: 2021-04-09 | End: 2021-05-11

## 2021-04-15 ENCOUNTER — TELEPHONE (OUTPATIENT)
Dept: PULMONOLOGY | Facility: CLINIC | Age: 56
End: 2021-04-15

## 2021-04-15 NOTE — TELEPHONE ENCOUNTER
----- Message from Augustina Elias DO sent at 4/13/2021  3:53 PM CDT -----  You may let the patient know that reviewed her CT chest results. She does have evidence of some fibrosis or interstitial lung disease secondary to her COVID-19.   As I mentioned

## 2021-04-16 NOTE — TELEPHONE ENCOUNTER
Spoke with patient. Patient informed of Dr. Renan Agarwal message below. Patient verbalized understanding.

## 2021-04-18 ENCOUNTER — IMMUNIZATION (OUTPATIENT)
Dept: LAB | Age: 56
End: 2021-04-18

## 2021-04-18 DIAGNOSIS — Z23 NEED FOR VACCINATION: Primary | ICD-10-CM

## 2021-04-18 PROCEDURE — 0011A COVID-19 MODERNA VACCINE: CPT

## 2021-04-18 PROCEDURE — 91301 COVID-19 MODERNA VACCINE: CPT

## 2021-04-22 ENCOUNTER — OFFICE VISIT (OUTPATIENT)
Dept: NEUROLOGY | Facility: CLINIC | Age: 56
End: 2021-04-22
Payer: COMMERCIAL

## 2021-04-22 ENCOUNTER — TELEPHONE (OUTPATIENT)
Dept: NEUROLOGY | Facility: CLINIC | Age: 56
End: 2021-04-22

## 2021-04-22 VITALS
SYSTOLIC BLOOD PRESSURE: 118 MMHG | OXYGEN SATURATION: 96 % | WEIGHT: 145 LBS | HEART RATE: 111 BPM | BODY MASS INDEX: 24.75 KG/M2 | DIASTOLIC BLOOD PRESSURE: 82 MMHG | HEIGHT: 64 IN

## 2021-04-22 DIAGNOSIS — R29.898 WEAKNESS OF BOTH LOWER EXTREMITIES: Primary | ICD-10-CM

## 2021-04-22 DIAGNOSIS — G62.81 CRITICAL ILLNESS POLYNEUROPATHY (HCC): ICD-10-CM

## 2021-04-22 DIAGNOSIS — M43.17 SPONDYLOLISTHESIS AT L5-S1 LEVEL: ICD-10-CM

## 2021-04-22 PROCEDURE — 3079F DIAST BP 80-89 MM HG: CPT | Performed by: PHYSICAL MEDICINE & REHABILITATION

## 2021-04-22 PROCEDURE — 3074F SYST BP LT 130 MM HG: CPT | Performed by: PHYSICAL MEDICINE & REHABILITATION

## 2021-04-22 PROCEDURE — 99214 OFFICE O/P EST MOD 30 MIN: CPT | Performed by: PHYSICAL MEDICINE & REHABILITATION

## 2021-04-22 PROCEDURE — 3008F BODY MASS INDEX DOCD: CPT | Performed by: PHYSICAL MEDICINE & REHABILITATION

## 2021-04-22 NOTE — PATIENT INSTRUCTIONS
-MRI of the lumbar spine and follow up after  -Continue PT and home exercises  -Continue Gabapentin 300mg three times/daily

## 2021-04-22 NOTE — TELEPHONE ENCOUNTER
[employer] for authorization of approval for MRI L-spine wo cpt code 02452. Authorization # [id number] effective 04/22/21 to 05/21/21. L/m advising of above. Can proceed with scheduling appt. Rosanna Otero

## 2021-04-22 NOTE — PROGRESS NOTES
[address] [facility]  FOLLOW UP EVALUATION      Chief Complaint: back pain.     HISTORY OF PRESENT ILLNESS:   Patient presents with:  Low Back Pain: LOV: 4/8/21 F/u on EMG BLE and low back pain that radiates to bilater but denies any radiating symptoms in the right lower extremity. She denies any loss of bowel bladder control, any fevers chills or weight loss. She is currently in day rehab and has progressed from a rolling walker to a cane for ambulation.   She has been NEXT TEST In Vitro Strip      • Microlet Lancets Does not apply Misc      • Meclizine HCl 12.5 MG Oral Tab Take 1 tablet (12.5 mg total) by mouth 3 (three) times daily as needed (for vertigo).  (Patient not taking: Reported on 3/9/2021 ) 30 tablet 0 Mood and affect appropriate    Gait small steppage gait  Posture: Dextroscoliotic posture noted    Musculoskeletal/Neurological Exam:    LUMBAR SPINE:  Inspection: no erythema, swelling, or obvious deformity.   Their iliac crest and shoulder heights are sym imaging of the lumbar spine which is notable for grade 2 anterolisthesis of L5 relative to S1 with bilateral L5 pars fracture. She has hypertrophic facet joints in the lower lumbar spine and dextroscoliotic curve.       All imaging results were reviewed an

## 2021-04-23 ENCOUNTER — OFFICE VISIT (OUTPATIENT)
Dept: PULMONOLOGY | Facility: CLINIC | Age: 56
End: 2021-04-23
Payer: COMMERCIAL

## 2021-04-23 VITALS
DIASTOLIC BLOOD PRESSURE: 74 MMHG | OXYGEN SATURATION: 97 % | TEMPERATURE: 98 F | SYSTOLIC BLOOD PRESSURE: 109 MMHG | HEART RATE: 94 BPM | HEIGHT: 64 IN | WEIGHT: 151.25 LBS | BODY MASS INDEX: 25.82 KG/M2

## 2021-04-23 DIAGNOSIS — J84.9 ILD (INTERSTITIAL LUNG DISEASE) (HCC): ICD-10-CM

## 2021-04-23 DIAGNOSIS — I10 ESSENTIAL HYPERTENSION WITH GOAL BLOOD PRESSURE LESS THAN 130/80: ICD-10-CM

## 2021-04-23 DIAGNOSIS — R06.00 DYSPNEA, UNSPECIFIED TYPE: Primary | ICD-10-CM

## 2021-04-23 PROCEDURE — 3074F SYST BP LT 130 MM HG: CPT | Performed by: INTERNAL MEDICINE

## 2021-04-23 PROCEDURE — 3008F BODY MASS INDEX DOCD: CPT | Performed by: INTERNAL MEDICINE

## 2021-04-23 PROCEDURE — 3078F DIAST BP <80 MM HG: CPT | Performed by: INTERNAL MEDICINE

## 2021-04-23 PROCEDURE — 99213 OFFICE O/P EST LOW 20 MIN: CPT | Performed by: INTERNAL MEDICINE

## 2021-04-23 RX ORDER — THIAMINE HCL 100 MG
2500 TABLET ORAL DAILY
COMMUNITY
End: 2021-08-04 | Stop reason: ALTCHOICE

## 2021-04-23 NOTE — TELEPHONE ENCOUNTER
Refill request for   •  metoprolol Tartrate 25 MG Oral Tab, TAKE 1/2 TABLET BY MOUTH TWICE DAILY FOR ELEVATED HEART RATE AND BLOOD PRESSURE, Disp: , Rfl:    Rachael gomes and alicia in [place]

## 2021-04-23 NOTE — PROGRESS NOTES
Referring Physician  Kelsey Ledezma MD    History of Present Illness  Patient presents today for follow-up visit to pulmonary clinic. Admits to some mild gradual improvement in dyspnea symptoms.   Still states overall exercise tolerance not at baseline dry  Lymphatic: no supraclavicular lymphadenopathy     Assessment  1. History of severe COVID-19 pneumonia  2. Dyspnea with exertion  3. ILD    Plan  -Patient presents today for pulmonary follow-up evaluation.   Prolonged hospitalization secondary to sev

## 2021-05-02 ENCOUNTER — TELEPHONE (OUTPATIENT)
Dept: NEUROLOGY | Facility: CLINIC | Age: 56
End: 2021-05-02

## 2021-05-02 DIAGNOSIS — G62.81 CRITICAL ILLNESS POLYNEUROPATHY (HCC): Primary | ICD-10-CM

## 2021-05-02 RX ORDER — GABAPENTIN 300 MG/1
300 CAPSULE ORAL 2 TIMES DAILY
Qty: 60 CAPSULE | Refills: 0 | Status: SHIPPED | OUTPATIENT
Start: 2021-05-02 | End: 2021-06-11

## 2021-05-03 NOTE — TELEPHONE ENCOUNTER
[facility] is not a preferred pharmacy with her insurance. Patient picked up Gabapentin sent by Dr. Albina Luciano on 05/02/21 and used coupon card for a copay of $10.66.  She would need to continue to use coupon card or use [facility] [facility] for her in

## 2021-05-11 ENCOUNTER — OFFICE VISIT (OUTPATIENT)
Dept: INTERNAL MEDICINE CLINIC | Facility: CLINIC | Age: 56
End: 2021-05-11
Payer: COMMERCIAL

## 2021-05-11 VITALS
HEART RATE: 81 BPM | OXYGEN SATURATION: 98 % | WEIGHT: 152 LBS | DIASTOLIC BLOOD PRESSURE: 92 MMHG | SYSTOLIC BLOOD PRESSURE: 135 MMHG | HEIGHT: 64 IN | BODY MASS INDEX: 25.95 KG/M2

## 2021-05-11 DIAGNOSIS — G62.9 NEUROPATHY: Primary | ICD-10-CM

## 2021-05-11 DIAGNOSIS — I10 ESSENTIAL HYPERTENSION: ICD-10-CM

## 2021-05-11 DIAGNOSIS — E11.9 TYPE 2 DIABETES MELLITUS WITHOUT COMPLICATION, WITHOUT LONG-TERM CURRENT USE OF INSULIN (HCC): ICD-10-CM

## 2021-05-11 DIAGNOSIS — Z12.31 SCREENING MAMMOGRAM, ENCOUNTER FOR: ICD-10-CM

## 2021-05-11 PROCEDURE — 99214 OFFICE O/P EST MOD 30 MIN: CPT | Performed by: INTERNAL MEDICINE

## 2021-05-11 PROCEDURE — 3080F DIAST BP >= 90 MM HG: CPT | Performed by: INTERNAL MEDICINE

## 2021-05-11 PROCEDURE — 3008F BODY MASS INDEX DOCD: CPT | Performed by: INTERNAL MEDICINE

## 2021-05-11 PROCEDURE — 3075F SYST BP GE 130 - 139MM HG: CPT | Performed by: INTERNAL MEDICINE

## 2021-05-11 NOTE — TELEPHONE ENCOUNTER
Left message to call back. Will await call back and/or follow up with patient regarding MRCP orders placed for patient to complete per Dr. Mahesh Weber.

## 2021-05-11 NOTE — TELEPHONE ENCOUNTER
Reviewed Pancreatic lesion seen on CT angio  Order MRI/MRCP pancreas to better evaluate.  Please notify patient     likely will need EUS pending MRI result

## 2021-05-11 NOTE — PROGRESS NOTES
HPI/Subjective:   Patient ID: Janene Diamond is a 50year old female. Patient presents with: Follow - Up: F/u from history of COVID, and leg pain.     Patient in office today for covid f/u left  Leg pain   Per pt  Endurance is still  Going  Flight meals. 270 tablet 0   • Blood Glucose Monitoring Suppl (CONTOUR NEXT MONITOR) w/Device Does not apply Kit       • Senna 8.6 MG Oral Tab Take 2 tablets by mouth daily.    (Patient not taking: Reported on 4/22/2021 )       Allergies:No Known Allergies    Obje prevent complications of DM2  Keep 1800 gini ADA- Diabetic diet   diet/exercise   accu-checks   Eye exam and foot exam yearly   Take medications as perscribed  Directions and side effects of medications discussed w/pt  Pt verbalized understanding and compli ordered in this encounter       Imaging & Referrals:  [facility] SUZY 2D+3D SCREENING BILAT (CPT=77067/79942)

## 2021-05-12 NOTE — TELEPHONE ENCOUNTER
Spoke to patient. Verified . D/w patient MRI orders placed per Dr. Lopez Rank (re: better evaluation per pancreas lesion noted on CT). Pending imaging results, may be further work up. Relayed central scheduling number to schedule imaging (P: [phone].

## 2021-05-12 NOTE — TELEPHONE ENCOUNTER
Left voicemail to call back.      Will await call back and/or follow up with patient regarding MRCP orders placed for patient to complete per Dr. Bryan Zazueta.

## 2021-05-21 ENCOUNTER — IMMUNIZATION (OUTPATIENT)
Dept: LAB | Age: 56
End: 2021-05-21
Attending: HOSPITALIST

## 2021-05-21 DIAGNOSIS — E11.9 TYPE 2 DIABETES MELLITUS WITHOUT COMPLICATION, WITHOUT LONG-TERM CURRENT USE OF INSULIN (HCC): ICD-10-CM

## 2021-05-21 DIAGNOSIS — Z23 NEED FOR VACCINATION: Primary | ICD-10-CM

## 2021-05-21 DIAGNOSIS — K21.9 GASTROESOPHAGEAL REFLUX DISEASE WITHOUT ESOPHAGITIS: ICD-10-CM

## 2021-05-21 PROCEDURE — 91301 COVID-19 MODERNA VACCINE: CPT

## 2021-05-21 PROCEDURE — 0012A COVID-19 MODERNA VACCINE: CPT

## 2021-05-21 NOTE — TELEPHONE ENCOUNTER
Routed to Dr. Joe Iyer     Medications pended as requested--pharmacy verified    Last office visit 5/11/2021 - R groin wound vac in place from prior ECMO site

## 2021-05-21 NOTE — TELEPHONE ENCOUNTER
Patient is requesting medication refill for     metFORMIN HCl 500 MG Oral Tab    omeprazole 20 MG Oral Capsule Delayed Release      Patient will be out of medication on 5/22      Please send to [facility] in [place], [address] [place] [address]

## 2021-05-22 RX ORDER — OMEPRAZOLE 20 MG/1
CAPSULE, DELAYED RELEASE ORAL
Qty: 90 CAPSULE | Refills: 0 | Status: SHIPPED | OUTPATIENT
Start: 2021-05-22 | End: 2021-06-20

## 2021-06-09 DIAGNOSIS — G62.81 CRITICAL ILLNESS POLYNEUROPATHY (HCC): ICD-10-CM

## 2021-06-10 ENCOUNTER — HOSPITAL ENCOUNTER (OUTPATIENT)
Dept: MRI IMAGING | Age: 56
Discharge: HOME | End: 2021-06-10
Attending: INTERNAL MEDICINE
Payer: COMMERCIAL

## 2021-06-10 DIAGNOSIS — K86.9 PANCREATIC LESION: ICD-10-CM

## 2021-06-10 PROCEDURE — 82565 ASSAY OF CREATININE: CPT

## 2021-06-10 PROCEDURE — A9575 INJ GADOTERATE MEGLUMI 0.1ML: HCPCS | Performed by: INTERNAL MEDICINE

## 2021-06-10 PROCEDURE — 74183 MRI ABD W/O CNTR FLWD CNTR: CPT | Performed by: INTERNAL MEDICINE

## 2021-06-10 RX ORDER — GABAPENTIN 300 MG/1
300 CAPSULE ORAL 3 TIMES DAILY
Qty: 270 CAPSULE | Refills: 1 | OUTPATIENT
Start: 2021-06-10

## 2021-06-11 ENCOUNTER — TELEPHONE (OUTPATIENT)
Dept: GASTROENTEROLOGY | Facility: CLINIC | Age: 56
End: 2021-06-11

## 2021-06-11 ENCOUNTER — MOBILE ENCOUNTER (OUTPATIENT)
Dept: NEUROLOGY | Facility: CLINIC | Age: 56
End: 2021-06-11

## 2021-06-11 DIAGNOSIS — G62.81 CRITICAL ILLNESS POLYNEUROPATHY (HCC): ICD-10-CM

## 2021-06-11 DIAGNOSIS — K86.2 PANCREATIC CYST: Primary | ICD-10-CM

## 2021-06-11 DIAGNOSIS — I10 ESSENTIAL HYPERTENSION WITH GOAL BLOOD PRESSURE LESS THAN 130/80: ICD-10-CM

## 2021-06-11 DIAGNOSIS — R93.3 ABNORMAL MAGNETIC RESONANCE CHOLANGIOPANCREATOGRAPHY (MRCP): ICD-10-CM

## 2021-06-11 RX ORDER — GABAPENTIN 300 MG/1
300 CAPSULE ORAL 2 TIMES DAILY
Qty: 60 CAPSULE | Refills: 3 | Status: SHIPPED | OUTPATIENT
Start: 2021-06-11 | End: 2021-09-15

## 2021-06-11 RX ORDER — GABAPENTIN 300 MG/1
300 CAPSULE ORAL 2 TIMES DAILY
Qty: 60 CAPSULE | Refills: 0 | Status: SHIPPED | OUTPATIENT
Start: 2021-06-11 | End: 2021-06-11

## 2021-06-11 NOTE — TELEPHONE ENCOUNTER
Dr. Ifeanyi Chow. I got MRI/MRCP. Can you review and see if we can get patient on for EUS    Dr. Mónica Waterman can you see patient for splenic vein occulsion.  She seems to have collaterals so does she need anticoagulation    Thank you

## 2021-06-11 NOTE — TELEPHONE ENCOUNTER
Medication request: Gabapentin 300mg Take 1 capsule (300 mg total) by mouth 2 (two) times a day.     LOV: 04/22/21  NOV: none    [place] 05[place]02/21 #60 r-0

## 2021-06-11 NOTE — TELEPHONE ENCOUNTER
There should be time to schedule this July 9th.     Thanks    Anusha Interiano MD  [facility], [facility] - Gastroenterology  6/11/2021  10:38 AM

## 2021-06-14 NOTE — TELEPHONE ENCOUNTER
Schedule EGD/EUS with possible FNA for pancreas cyst, abnormal MRCP at the hospital. Hold metformin day of the procedure    Will need levofloxacin 500 mg IV ordered (not to be given until directed during the procedure)    MD Fouzia Monreal Cl

## 2021-06-16 NOTE — TELEPHONE ENCOUNTER
Scheduled for:  EGD [id number] and EUS w/poss FNA [id number]  Provider Name:  Dr. Linette Das  Date:  8/2/21  Location:    [facility]  Sedation:  MAC  Time:  1330 (pt is aware to arrive at 1230)   Prep:  Nothing to eat after midnight   Nothing to drink after 1000 the day of

## 2021-06-17 ENCOUNTER — TELEPHONE (OUTPATIENT)
Dept: HEMATOLOGY/ONCOLOGY | Facility: HOSPITAL | Age: 56
End: 2021-06-17

## 2021-06-20 DIAGNOSIS — K21.9 GASTROESOPHAGEAL REFLUX DISEASE WITHOUT ESOPHAGITIS: ICD-10-CM

## 2021-06-20 DIAGNOSIS — I10 ESSENTIAL HYPERTENSION WITH GOAL BLOOD PRESSURE LESS THAN 130/80: ICD-10-CM

## 2021-06-20 DIAGNOSIS — E11.9 TYPE 2 DIABETES MELLITUS WITHOUT COMPLICATION, WITHOUT LONG-TERM CURRENT USE OF INSULIN (HCC): ICD-10-CM

## 2021-06-22 RX ORDER — OMEPRAZOLE 20 MG/1
CAPSULE, DELAYED RELEASE ORAL
Qty: 90 CAPSULE | Refills: 0 | Status: SHIPPED | OUTPATIENT
Start: 2021-06-22 | End: 2021-10-05

## 2021-06-29 ENCOUNTER — TELEPHONE (OUTPATIENT)
Dept: HEMATOLOGY/ONCOLOGY | Facility: HOSPITAL | Age: 56
End: 2021-06-29

## 2021-06-29 NOTE — TELEPHONE ENCOUNTER
[facility]29 to schedule consult referred by  for splenic vein occulsion.

## 2021-06-29 NOTE — TELEPHONE ENCOUNTER
----- Message from Danilo Love RN sent at 6/14/2021  8:26 AM CDT -----  Regarding: Please schedule for this week  Please schedule this pt as a new consult for Dr Kurt Giron sometime this week. splenic vein occulsion, referred by Dr Zack Au. Thank you.

## 2021-06-30 ENCOUNTER — OFFICE VISIT (OUTPATIENT)
Dept: HEMATOLOGY/ONCOLOGY | Facility: HOSPITAL | Age: 56
End: 2021-06-30
Attending: INTERNAL MEDICINE
Payer: COMMERCIAL

## 2021-06-30 VITALS
TEMPERATURE: 98 F | WEIGHT: 153 LBS | BODY MASS INDEX: 27.11 KG/M2 | RESPIRATION RATE: 16 BRPM | SYSTOLIC BLOOD PRESSURE: 154 MMHG | HEART RATE: 83 BPM | HEIGHT: 63 IN | DIASTOLIC BLOOD PRESSURE: 93 MMHG | OXYGEN SATURATION: 97 %

## 2021-06-30 DIAGNOSIS — I82.890 SPLENIC VEIN THROMBOSIS: ICD-10-CM

## 2021-06-30 DIAGNOSIS — K86.89 PANCREATIC MASS: Primary | ICD-10-CM

## 2021-06-30 PROCEDURE — 99245 OFF/OP CONSLTJ NEW/EST HI 55: CPT | Performed by: INTERNAL MEDICINE

## 2021-07-01 ENCOUNTER — TELEPHONE (OUTPATIENT)
Dept: GASTROENTEROLOGY | Facility: CLINIC | Age: 56
End: 2021-07-01

## 2021-07-01 DIAGNOSIS — R93.3 ABNORMAL MAGNETIC RESONANCE CHOLANGIOPANCREATOGRAPHY (MRCP): ICD-10-CM

## 2021-07-01 DIAGNOSIS — K86.2 PANCREAS CYST: Primary | ICD-10-CM

## 2021-07-01 PROBLEM — I82.890 SPLENIC VEIN THROMBOSIS: Status: ACTIVE | Noted: 2021-07-01

## 2021-07-01 PROBLEM — K86.89 PANCREATIC MASS: Status: ACTIVE | Noted: 2021-07-01

## 2021-07-01 NOTE — TELEPHONE ENCOUNTER
Rescheduled for:  -[phone] and EUS w/poss FNA [id number]  Provider Name:  Dr. Hue Walter  Date:    From-8/2/21 To-7/12/21  Location:    [facility]  Sedation:  MAC  Time:    Maciej Yip  GP-1131 (pt is aware to arrive at 1415)   Prep:  Nothing to eat after midnight

## 2021-07-01 NOTE — CONSULTS
[facility] History and Physical    Patient Name: Aixa Varghese   YOB: 1965   Medical Record Number: [id number]   CSN: [id number]   Attending Physician:  Bakari Mariscal MD       Date of Visit: 6/30/2021     Chief Complaint:  Pancreati malignancy. Past Medical History:  Past Medical History:   Diagnosis Date   • COVID-19 10/23/2020   • Diabetes [facility])        Past Surgical History:  No past surgical history on file.     Family History:  Family History   Problem Relation Age of Onset Size: adult)   Pulse 83   Temp 98.1 °F (36.7 °C) (Temporal)   Resp 16   Ht 1.6 m (5' 3\")   Wt 69.4 kg (153 lb)   LMP 01/04/2021 (Approximate)   SpO2 97%   BMI 27.10 kg/m²     Physical Examination:  General: Patient is alert and oriented x 3, not in acute mechanical    Case will be presented at multidisciplinary tumor board    Emotional Well Being:    Emotional Well being discussed, patient aware of support systems available through the [facility]. No acute issues.     >60 minutes spent with patient discu

## 2021-07-01 NOTE — TELEPHONE ENCOUNTER
Communicated with Dr. Shanell Christiansen,    GI staff: There is an opening for this patient who would like her procedure moved up.      Please offer 7/12/21    Thanks    Royce Ivey MD  [facility], [facility] - Gastroenterology  7/1/2021  9:12 AM

## 2021-07-07 NOTE — PAT NURSING NOTE
Patient does not need to be tested for covid per IC pt has been vaccinated, is asymptomatic, and not immunocompromised.

## 2021-07-09 ENCOUNTER — TELEPHONE (OUTPATIENT)
Dept: GASTROENTEROLOGY | Facility: CLINIC | Age: 56
End: 2021-07-09

## 2021-07-09 DIAGNOSIS — R93.3 ABNORMAL MAGNETIC RESONANCE CHOLANGIOPANCREATOGRAPHY (MRCP): ICD-10-CM

## 2021-07-09 DIAGNOSIS — K86.2 PANCREAS CYST: Primary | ICD-10-CM

## 2021-07-09 NOTE — TELEPHONE ENCOUNTER
Rescheduled for:  -[phone] and EUS w/poss FNA [id number]  Provider Name:  Dr. Sid Ordoñez  Date:       7/12/21  [address] [place] [address]  Time:                  From-1515       To-1030 (pt is aware to arrive CJ 1635)   Prep:  NPO after midnight, sent new

## 2021-07-12 ENCOUNTER — ANESTHESIA EVENT (OUTPATIENT)
Dept: ENDOSCOPY | Facility: HOSPITAL | Age: 56
End: 2021-07-12
Payer: COMMERCIAL

## 2021-07-12 ENCOUNTER — HOSPITAL ENCOUNTER (OUTPATIENT)
Facility: HOSPITAL | Age: 56
Setting detail: HOSPITAL OUTPATIENT SURGERY
Discharge: HOME | End: 2021-07-12
Attending: INTERNAL MEDICINE | Admitting: INTERNAL MEDICINE
Payer: COMMERCIAL

## 2021-07-12 ENCOUNTER — APPOINTMENT (OUTPATIENT)
Dept: HEMATOLOGY/ONCOLOGY | Facility: HOSPITAL | Age: 56
End: 2021-07-12
Attending: INTERNAL MEDICINE
Payer: COMMERCIAL

## 2021-07-12 ENCOUNTER — ANESTHESIA (OUTPATIENT)
Dept: ENDOSCOPY | Facility: HOSPITAL | Age: 56
End: 2021-07-12
Payer: COMMERCIAL

## 2021-07-12 ENCOUNTER — TELEPHONE (OUTPATIENT)
Dept: GASTROENTEROLOGY | Facility: CLINIC | Age: 56
End: 2021-07-12

## 2021-07-12 VITALS
OXYGEN SATURATION: 97 % | RESPIRATION RATE: 10 BRPM | HEIGHT: 64 IN | BODY MASS INDEX: 25.27 KG/M2 | HEART RATE: 80 BPM | WEIGHT: 148 LBS | DIASTOLIC BLOOD PRESSURE: 93 MMHG | TEMPERATURE: 98 F | SYSTOLIC BLOOD PRESSURE: 136 MMHG

## 2021-07-12 DIAGNOSIS — R93.3 ABNORMAL MAGNETIC RESONANCE CHOLANGIOPANCREATOGRAPHY (MRCP): ICD-10-CM

## 2021-07-12 DIAGNOSIS — K86.2 PANCREATIC CYST: ICD-10-CM

## 2021-07-12 LAB
AMYLASE FLD-CCNC: 10 U/L
AMYLASE FLD-CCNC: 12 U/L
B-HCG UR QL: NEGATIVE
GLUCOSE BLDC GLUCOMTR-MCNC: 110 MG/DL (ref 70–99)

## 2021-07-12 PROCEDURE — 0DB98ZX EXCISION OF DUODENUM, VIA NATURAL OR ARTIFICIAL OPENING ENDOSCOPIC, DIAGNOSTIC: ICD-10-PCS | Performed by: INTERNAL MEDICINE

## 2021-07-12 PROCEDURE — 0FDG8ZX EXTRACTION OF PANCREAS, VIA NATURAL OR ARTIFICIAL OPENING ENDOSCOPIC, DIAGNOSTIC: ICD-10-PCS | Performed by: INTERNAL MEDICINE

## 2021-07-12 PROCEDURE — 43242 EGD US FINE NEEDLE BX/ASPIR: CPT | Performed by: INTERNAL MEDICINE

## 2021-07-12 PROCEDURE — 43239 EGD BIOPSY SINGLE/MULTIPLE: CPT | Performed by: INTERNAL MEDICINE

## 2021-07-12 RX ORDER — DEXTROSE MONOHYDRATE 25 G/50ML
50 INJECTION, SOLUTION INTRAVENOUS
Status: DISCONTINUED | OUTPATIENT
Start: 2021-07-12 | End: 2021-07-12

## 2021-07-12 RX ORDER — HALOPERIDOL 5 MG/ML
0.25 INJECTION INTRAMUSCULAR ONCE AS NEEDED
Status: DISCONTINUED | OUTPATIENT
Start: 2021-07-12 | End: 2021-07-12

## 2021-07-12 RX ORDER — SODIUM CHLORIDE, SODIUM LACTATE, POTASSIUM CHLORIDE, CALCIUM CHLORIDE 600; 310; 30; 20 MG/100ML; MG/100ML; MG/100ML; MG/100ML
INJECTION, SOLUTION INTRAVENOUS CONTINUOUS
Status: DISCONTINUED | OUTPATIENT
Start: 2021-07-12 | End: 2021-07-12

## 2021-07-12 RX ORDER — HYDROCODONE BITARTRATE AND ACETAMINOPHEN 5; 325 MG/1; MG/1
1 TABLET ORAL AS NEEDED
Status: DISCONTINUED | OUTPATIENT
Start: 2021-07-12 | End: 2021-07-12

## 2021-07-12 RX ORDER — LIDOCAINE HYDROCHLORIDE 10 MG/ML
INJECTION, SOLUTION EPIDURAL; INFILTRATION; INTRACAUDAL; PERINEURAL AS NEEDED
Status: DISCONTINUED | OUTPATIENT
Start: 2021-07-12 | End: 2021-07-12 | Stop reason: SURG

## 2021-07-12 RX ORDER — PROCHLORPERAZINE EDISYLATE 5 MG/ML
5 INJECTION INTRAMUSCULAR; INTRAVENOUS ONCE AS NEEDED
Status: DISCONTINUED | OUTPATIENT
Start: 2021-07-12 | End: 2021-07-12

## 2021-07-12 RX ORDER — LEVOFLOXACIN 5 MG/ML
500 INJECTION, SOLUTION INTRAVENOUS ONCE
Status: COMPLETED | OUTPATIENT
Start: 2021-07-12 | End: 2021-07-12

## 2021-07-12 RX ORDER — LEVOFLOXACIN 500 MG/1
500 TABLET, FILM COATED ORAL EVERY 24 HOURS
Qty: 4 TABLET | Refills: 0 | Status: SHIPPED | OUTPATIENT
Start: 2021-07-13 | End: 2021-07-17

## 2021-07-12 RX ORDER — HYDROCODONE BITARTRATE AND ACETAMINOPHEN 5; 325 MG/1; MG/1
2 TABLET ORAL AS NEEDED
Status: DISCONTINUED | OUTPATIENT
Start: 2021-07-12 | End: 2021-07-12

## 2021-07-12 RX ORDER — METOPROLOL TARTRATE 5 MG/5ML
2.5 INJECTION INTRAVENOUS ONCE
Status: DISCONTINUED | OUTPATIENT
Start: 2021-07-12 | End: 2021-07-12

## 2021-07-12 RX ORDER — ONDANSETRON 2 MG/ML
4 INJECTION INTRAMUSCULAR; INTRAVENOUS ONCE AS NEEDED
Status: DISCONTINUED | OUTPATIENT
Start: 2021-07-12 | End: 2021-07-12

## 2021-07-12 RX ORDER — MIDAZOLAM HYDROCHLORIDE 1 MG/ML
INJECTION INTRAMUSCULAR; INTRAVENOUS AS NEEDED
Status: DISCONTINUED | OUTPATIENT
Start: 2021-07-12 | End: 2021-07-12 | Stop reason: SURG

## 2021-07-12 RX ORDER — NALOXONE HYDROCHLORIDE 0.4 MG/ML
80 INJECTION, SOLUTION INTRAMUSCULAR; INTRAVENOUS; SUBCUTANEOUS AS NEEDED
Status: DISCONTINUED | OUTPATIENT
Start: 2021-07-12 | End: 2021-07-12

## 2021-07-12 RX ADMIN — SODIUM CHLORIDE, SODIUM LACTATE, POTASSIUM CHLORIDE, CALCIUM CHLORIDE: 600; 310; 30; 20 INJECTION, SOLUTION INTRAVENOUS at 11:07:00

## 2021-07-12 RX ADMIN — LIDOCAINE HYDROCHLORIDE 50 MG: 10 INJECTION, SOLUTION EPIDURAL; INFILTRATION; INTRACAUDAL; PERINEURAL at 11:09:00

## 2021-07-12 RX ADMIN — SODIUM CHLORIDE, SODIUM LACTATE, POTASSIUM CHLORIDE, CALCIUM CHLORIDE: 600; 310; 30; 20 INJECTION, SOLUTION INTRAVENOUS at 11:45:00

## 2021-07-12 RX ADMIN — MIDAZOLAM HYDROCHLORIDE 2 MG: 1 INJECTION INTRAMUSCULAR; INTRAVENOUS at 11:07:00

## 2021-07-12 NOTE — ANESTHESIA POSTPROCEDURE EVALUATION
Patient: Aixa Varghese    Procedure Summary     Date: 07/12/21 Room / Location: [address] [place] [address] ENDOSCOPY 01 / [address] [place] [address] ENDOSCOPY    Anesthesia Start: 1107 Anesthesia Stop: 4247    Procedures:       ESOPHAGOGASTRODUODENOSCOPY /ENDOSCOPIC ULTRASOUND with possible fi

## 2021-07-12 NOTE — ANESTHESIA PREPROCEDURE EVALUATION
Anesthesia PreOp Note    HPI:     Bubba Cullen is a 50year old female who presents for preoperative consultation requested by: Goldie Alberto MD    Date of Surgery: 7/12/2021    Procedure(s):  ESOPHAGOGASTRODUODENOSCOPY /ENDOSCOPIC Lora Box capsule, Rfl: 3, 7/12/2021 at 0800  Microlet Lancets Does not apply Misc, Check sugars 2 times  Daily and as needed, Disp: 200 each, Rfl: 1  CONTOUR NEXT TEST In Vitro Strip, Check sugars  Twice daily and as needed, Disp: 200 strip, Rfl: 1  Cyanocobalamin No        Bike Helmet: Not Asked        Seat Belt: Not Asked        Self-Exams: Not Asked    Social History Narrative      The patient uses the following assistive device(s):  single-point cane. The patient does not live in a home with stairs.     So Neuro/Psych    (+)  neuromuscular disease (Critical illness polyneuropathy ),       Comments: Bilateral leg weakness     GI/Hepatic/Renal      Comments: Pancreatic mass     Endo/Other    (+) diabetes mellitus,   Abdominal  - normal exam             Anesthe

## 2021-07-12 NOTE — H&P
History & Physical Examination    Patient Name: Nilam Fuller  MRN: [id number]  CSN: [id number]  YOB: 1965    Diagnosis: abnormal MRCP, pancreas cyst    metFORMIN HCl 500 MG Oral Tab, Take 1.5 tablets (750 mg total) by mouth 2 (two) status: Never Smoker      Smokeless tobacco: Never Used    Alcohol use: Not Currently      Comment: rare      SYSTEM Check if Physical Exam is Normal If not normal, please explain:   SAMMY Patrick [ Kevyn Flores [ Roverto Negron [ Kishore Hung

## 2021-07-12 NOTE — OPERATIVE REPORT
Esophagogastroduodenoscopy (EGD) & Endoscopic Ultrasound (EUS) Report    Layne Arana    KVNG 10/18/1965 Age 50year old   PCP Reina Schmitt MD Endoscopist Apurva Kaye MD     Date of procedure: 21    Procedure: EGD w/ biopsy and E noted at 36 cm and appeared unremarkable. The esophageal mucosa appeared unremarkable. 2. Stomach: There was a bulging area in the proximal stomach along the posterior wall. Otherwise, the stomach distended normally. Normal rugal folds were seen.  The pylo

## 2021-07-12 NOTE — TELEPHONE ENCOUNTER
Sending new prescription for levofloxacin 500 mg PO daily starting tomorrow for 4 days    Discussed above and findings and recommendations with patient and two family members bedside.     Madeleine Rowland MD  [facility], [facility] - Gastroenterology  7/12/2021

## 2021-07-13 DIAGNOSIS — G62.81 CRITICAL ILLNESS POLYNEUROPATHY (HCC): ICD-10-CM

## 2021-07-13 RX ORDER — GABAPENTIN 300 MG/1
300 CAPSULE ORAL 2 TIMES DAILY
Qty: 60 CAPSULE | Refills: 3 | OUTPATIENT
Start: 2021-07-13

## 2021-07-13 NOTE — TELEPHONE ENCOUNTER
Medication request: Gabapentin 300mg Take 1 capsule (300 mg total) by mouth 2 (two) times a day. LOV: 04/22/21  NOV: none    [place] 06/11/21 #60 r-3    Patient given 4 month supply on 06/11/21. Denied.

## 2021-07-15 ENCOUNTER — TELEPHONE (OUTPATIENT)
Dept: GASTROENTEROLOGY | Facility: CLINIC | Age: 56
End: 2021-07-15

## 2021-07-15 NOTE — TELEPHONE ENCOUNTER
Called patient to discuss fliud/path results. No answer.  Left voice message to call back    Denton Nielson MD  [facility], [facility] - Gastroenterology  7/15/2021  11:02 AM

## 2021-07-15 NOTE — TELEPHONE ENCOUNTER
Called and spoke with patient's mother    Discussed the cyst pathology did not specifically identify what type of cyst which is common.  Did not identify malignancy which I do not suspect is present but CEA is elevated which can suggest a pre-cancerous type

## 2021-07-19 DIAGNOSIS — G62.81 CRITICAL ILLNESS POLYNEUROPATHY (HCC): ICD-10-CM

## 2021-07-19 RX ORDER — GABAPENTIN 300 MG/1
300 CAPSULE ORAL 2 TIMES DAILY
Qty: 60 CAPSULE | Refills: 3 | Status: CANCELLED | OUTPATIENT
Start: 2021-07-19

## 2021-07-19 NOTE — TELEPHONE ENCOUNTER
Gabapentin 300mg  1 capsule (300 mg total) by mouth 2 (two) times a day. NOV: NONE  LOV: 04/22/2021    Last Refill: 06/11/2021 QTY 61 Refills-3    Spoke with patient and let her know she has refills and that this request is Denied.

## 2021-07-20 ENCOUNTER — APPOINTMENT (OUTPATIENT)
Dept: GENERAL RADIOLOGY | Age: 56
End: 2021-07-20
Attending: EMERGENCY MEDICINE

## 2021-07-20 ENCOUNTER — HOSPITAL ENCOUNTER (EMERGENCY)
Age: 56
Discharge: HOME | End: 2021-07-21
Attending: EMERGENCY MEDICINE

## 2021-07-20 DIAGNOSIS — B34.9 VIRAL SYNDROME: Primary | ICD-10-CM

## 2021-07-20 DIAGNOSIS — N39.0 ACUTE UTI (URINARY TRACT INFECTION): ICD-10-CM

## 2021-07-20 DIAGNOSIS — M79.10 MYALGIA: ICD-10-CM

## 2021-07-20 LAB
ALBUMIN SERPL-MCNC: 3.7 G/DL (ref 3.6–5.1)
ALBUMIN/GLOB SERPL: 0.9 {RATIO} (ref 1–2.4)
ALP SERPL-CCNC: 61 UNITS/L (ref 45–117)
ALT SERPL-CCNC: 9 UNITS/L
ANION GAP SERPL CALC-SCNC: 11 MMOL/L (ref 10–20)
AST SERPL-CCNC: 16 UNITS/L
BASOPHILS # BLD: 0 K/MCL (ref 0–0.3)
BASOPHILS NFR BLD: 1 %
BILIRUB SERPL-MCNC: 1.4 MG/DL (ref 0.2–1)
BUN SERPL-MCNC: 18 MG/DL (ref 6–20)
BUN/CREAT SERPL: 23 (ref 7–25)
CALCIUM SERPL-MCNC: 9.2 MG/DL (ref 8.4–10.2)
CHLORIDE SERPL-SCNC: 105 MMOL/L (ref 98–107)
CO2 SERPL-SCNC: 26 MMOL/L (ref 21–32)
CREAT SERPL-MCNC: 0.79 MG/DL (ref 0.51–0.95)
DEPRECATED RDW RBC: 39.7 FL (ref 39–50)
EOSINOPHIL # BLD: 0.1 K/MCL (ref 0–0.5)
EOSINOPHIL NFR BLD: 2 %
ERYTHROCYTE [DISTWIDTH] IN BLOOD: 12.4 % (ref 11–15)
FASTING DURATION TIME PATIENT: ABNORMAL H
GFR SERPLBLD BASED ON 1.73 SQ M-ARVRAT: 86 ML/MIN/1.73M2
GLOBULIN SER-MCNC: 4.2 G/DL (ref 2–4)
GLUCOSE SERPL-MCNC: 129 MG/DL (ref 65–99)
HCT VFR BLD CALC: 34.9 % (ref 36–46.5)
HGB BLD-MCNC: 11.8 G/DL (ref 12–15.5)
IMM GRANULOCYTES # BLD AUTO: 0 K/MCL (ref 0–0.2)
IMM GRANULOCYTES # BLD: 0 %
LYMPHOCYTES # BLD: 1.5 K/MCL (ref 1–4)
LYMPHOCYTES NFR BLD: 18 %
MAGNESIUM SERPL-MCNC: 2 MG/DL (ref 1.7–2.4)
MCH RBC QN AUTO: 29.6 PG (ref 26–34)
MCHC RBC AUTO-ENTMCNC: 33.8 G/DL (ref 32–36.5)
MCV RBC AUTO: 87.5 FL (ref 78–100)
MONOCYTES # BLD: 0.8 K/MCL (ref 0.3–0.9)
MONOCYTES NFR BLD: 10 %
NEUTROPHILS # BLD: 6.1 K/MCL (ref 1.8–7.7)
NEUTROPHILS NFR BLD: 69 %
NRBC BLD MANUAL-RTO: 0 /100 WBC
PLATELET # BLD AUTO: 165 K/MCL (ref 140–450)
POTASSIUM SERPL-SCNC: 4.2 MMOL/L (ref 3.4–5.1)
PROCALCITONIN SERPL IA-MCNC: <0.05 NG/ML
PROT SERPL-MCNC: 7.9 G/DL (ref 6.4–8.2)
RBC # BLD: 3.99 MIL/MCL (ref 4–5.2)
SARS-COV-2 RNA RESP QL NAA+PROBE: NOT DETECTED
SERVICE CMNT-IMP: NORMAL
SERVICE CMNT-IMP: NORMAL
SODIUM SERPL-SCNC: 138 MMOL/L (ref 135–145)
TROPONIN I SERPL HS-MCNC: <0.02 NG/ML
WBC # BLD: 8.6 K/MCL (ref 4.2–11)

## 2021-07-20 PROCEDURE — 83735 ASSAY OF MAGNESIUM: CPT | Performed by: EMERGENCY MEDICINE

## 2021-07-20 PROCEDURE — 96361 HYDRATE IV INFUSION ADD-ON: CPT

## 2021-07-20 PROCEDURE — 96374 THER/PROPH/DIAG INJ IV PUSH: CPT

## 2021-07-20 PROCEDURE — 99284 EMERGENCY DEPT VISIT MOD MDM: CPT

## 2021-07-20 PROCEDURE — 85025 COMPLETE CBC W/AUTO DIFF WBC: CPT | Performed by: EMERGENCY MEDICINE

## 2021-07-20 PROCEDURE — 93005 ELECTROCARDIOGRAM TRACING: CPT | Performed by: EMERGENCY MEDICINE

## 2021-07-20 PROCEDURE — 71045 X-RAY EXAM CHEST 1 VIEW: CPT

## 2021-07-20 PROCEDURE — 87635 SARS-COV-2 COVID-19 AMP PRB: CPT | Performed by: EMERGENCY MEDICINE

## 2021-07-20 PROCEDURE — 84145 PROCALCITONIN (PCT): CPT | Performed by: EMERGENCY MEDICINE

## 2021-07-20 PROCEDURE — 80053 COMPREHEN METABOLIC PANEL: CPT | Performed by: EMERGENCY MEDICINE

## 2021-07-20 PROCEDURE — 84484 ASSAY OF TROPONIN QUANT: CPT | Performed by: EMERGENCY MEDICINE

## 2021-07-20 PROCEDURE — C9803 HOPD COVID-19 SPEC COLLECT: HCPCS

## 2021-07-21 VITALS
WEIGHT: 151.46 LBS | BODY MASS INDEX: 25.86 KG/M2 | TEMPERATURE: 99.6 F | HEIGHT: 64 IN | SYSTOLIC BLOOD PRESSURE: 119 MMHG | OXYGEN SATURATION: 97 % | HEART RATE: 84 BPM | DIASTOLIC BLOOD PRESSURE: 74 MMHG | RESPIRATION RATE: 16 BRPM

## 2021-07-21 LAB
APPEARANCE UR: CLEAR
BACTERIA #/AREA URNS HPF: ABNORMAL /HPF
BILIRUB UR QL STRIP: NEGATIVE
C PNEUM DNA SPEC QL NAA+PROBE: NOT DETECTED
COLOR UR: YELLOW
FLUAV H1 2009 PAND RNA SPEC QL NAA+PROBE: NOT DETECTED
FLUAV H1 RNA SPEC QL NAA+PROBE: NOT DETECTED
FLUAV H3 RNA SPEC QL NAA+PROBE: NOT DETECTED
FLUAV RNA SPEC QL NAA+PROBE: NORMAL
FLUBV RNA SPEC QL NAA+PROBE: NOT DETECTED
GLUCOSE UR STRIP-MCNC: NEGATIVE MG/DL
HADV DNA SPEC QL NAA+PROBE: NOT DETECTED
HBOV DNA SPEC QL NAA+PROBE: NOT DETECTED
HCOV 229E RNA SPEC QL NAA+PROBE: NOT DETECTED
HCOV HKU1 RNA SPEC QL NAA+PROBE: NOT DETECTED
HCOV NL63 RNA SPEC QL NAA+PROBE: NOT DETECTED
HCOV OC43 RNA SPEC QL NAA+PROBE: NOT DETECTED
HGB UR QL STRIP: NEGATIVE
HMPV RNA SPEC QL NAA+PROBE: NOT DETECTED
HPIV1 RNA SPEC QL NAA+PROBE: NOT DETECTED
HPIV2 RNA SPEC QL NAA+PROBE: NOT DETECTED
HPIV3 RNA SPEC QL NAA+PROBE: NOT DETECTED
HPIV4 RNA SPEC QL NAA+PROBE: NOT DETECTED
HYALINE CASTS #/AREA URNS LPF: ABNORMAL /LPF
KETONES UR STRIP-MCNC: NEGATIVE MG/DL
LEUKOCYTE ESTERASE UR QL STRIP: ABNORMAL
M PNEUMO DNA SPEC QL NAA+PROBE: NOT DETECTED
NITRITE UR QL STRIP: NEGATIVE
PH UR STRIP: 7 [PH] (ref 5–7)
PROT UR STRIP-MCNC: NEGATIVE MG/DL
RAINBOW EXTRA TUBES HOLD SPECIMEN: NORMAL
RBC #/AREA URNS HPF: ABNORMAL /HPF
RSV A RNA SPEC QL NAA+PROBE: NOT DETECTED
RSV B RNA SPEC QL NAA+PROBE: NOT DETECTED
RV+EV RNA SPEC QL NAA+PROBE: NOT DETECTED
SERVICE CMNT-IMP: NORMAL
SP GR UR STRIP: 1.01 (ref 1–1.03)
SQUAMOUS #/AREA URNS HPF: ABNORMAL /HPF
TRANS CELLS #/AREA URNS HPF: ABNORMAL /HPF
UROBILINOGEN UR STRIP-MCNC: 0.2 MG/DL
WBC #/AREA URNS HPF: ABNORMAL /HPF

## 2021-07-21 PROCEDURE — 10002800 HB RX 250 W HCPCS: Performed by: EMERGENCY MEDICINE

## 2021-07-21 PROCEDURE — 87633 RESP VIRUS 12-25 TARGETS: CPT | Performed by: EMERGENCY MEDICINE

## 2021-07-21 PROCEDURE — 87086 URINE CULTURE/COLONY COUNT: CPT | Performed by: EMERGENCY MEDICINE

## 2021-07-21 PROCEDURE — 96374 THER/PROPH/DIAG INJ IV PUSH: CPT

## 2021-07-21 PROCEDURE — 10002807 HB RX 258: Performed by: EMERGENCY MEDICINE

## 2021-07-21 PROCEDURE — 81001 URINALYSIS AUTO W/SCOPE: CPT | Performed by: EMERGENCY MEDICINE

## 2021-07-21 RX ORDER — CEFDINIR 300 MG/1
300 CAPSULE ORAL 2 TIMES DAILY
Qty: 20 CAPSULE | Refills: 0 | Status: SHIPPED | OUTPATIENT
Start: 2021-07-21 | End: 2021-07-31

## 2021-07-21 RX ORDER — IBUPROFEN 600 MG/1
600 TABLET ORAL EVERY 8 HOURS PRN
Qty: 21 TABLET | Refills: 0 | Status: SHIPPED | OUTPATIENT
Start: 2021-07-21 | End: 2021-07-28

## 2021-07-21 RX ADMIN — KETOROLAC TROMETHAMINE 15 MG: 15 INJECTION, SOLUTION INTRAMUSCULAR; INTRAVENOUS at 00:22

## 2021-07-21 RX ADMIN — SODIUM CHLORIDE, POTASSIUM CHLORIDE, SODIUM LACTATE AND CALCIUM CHLORIDE 1000 ML: 600; 310; 30; 20 INJECTION, SOLUTION INTRAVENOUS at 00:22

## 2021-07-22 LAB
ATRIAL RATE (BPM): 82
BACTERIA UR CULT: NORMAL
P AXIS (DEGREES): 9
PR-INTERVAL (MSEC): 176
QRS-INTERVAL (MSEC): 68
QT-INTERVAL (MSEC): 368
QTC: 430
R AXIS (DEGREES): -5
REPORT TEXT: NORMAL
T AXIS (DEGREES): 28
VENTRICULAR RATE EKG/MIN (BPM): 82

## 2021-08-03 NOTE — CONSULTS
[facility]    Patient Name:  Janene Diamond   YOB: 1965   Gender:  Female   Appt Date:  8/4/2021   Provider:  Jacqui Ingram MD   Insurance:  [facility]     PATIENT PROVIDERS  Referring Provid Patient underwent EUS/FNA by Dr. Sumit Malin on  7/12/2021. This again demonstrated the large cystic lesion in tail of the pancreas measuring 9.8 x 8.5 cm. Biopsy was taken. Pathology negative for malignant cells.  CEA of fluid was elevated > 10,000, amylase status: Single      Spouse name: Not on file      Number of children: Not on file      Years of education: Not on file      Highest education level: Not on file    Tobacco Use      Smoking status: Never Smoker      Smokeless tobacco: Never Used    Vaping U Abdomen is soft. Tenderness: There is no abdominal tenderness. There is no guarding or rebound. Hernia: No hernia is present. Musculoskeletal:         General: Normal range of motion. Cervical back: Normal range of motion.    Skin:     Gene septations on post-contrast subtraction images.       OTHER FINDINGS:   LIVER: No significant drop in signal on the in and out of phase images to suggest fatty liver.  No focal suspicious hepatic abnormality is noted.  No abnormal postcontrast enhancement tissue sampling.       2. 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    Findings discussed with the patient at length.   Recommend robotic-assisted, possible open, distal pancreatectomy and splenectomy  Risks of the procedure were explained to the patient

## 2021-08-03 NOTE — H&P (VIEW-ONLY)
[facility]    Patient Name:  Elise Mchugh   YOB: 1965   Gender:  Female   Appt Date:  8/4/2021   Provider:  Xochitl Higgins MD   Insurance:  [facility]     PATIENT PROVIDERS  Referring Provid Patient underwent EUS/FNA by Dr. Clif Mc on  7/12/2021. This again demonstrated the large cystic lesion in tail of the pancreas measuring 9.8 x 8.5 cm. Biopsy was taken. Pathology negative for malignant cells.  CEA of fluid was elevated > 10,000, amylase status: Single      Spouse name: Not on file      Number of children: Not on file      Years of education: Not on file      Highest education level: Not on file    Tobacco Use      Smoking status: Never Smoker      Smokeless tobacco: Never Used    Vaping U Abdomen is soft. Tenderness: There is no abdominal tenderness. There is no guarding or rebound. Hernia: No hernia is present. Musculoskeletal:         General: Normal range of motion. Cervical back: Normal range of motion.    Skin:     Gene septations on post-contrast subtraction images.       OTHER FINDINGS:   LIVER: No significant drop in signal on the in and out of phase images to suggest fatty liver.  No focal suspicious hepatic abnormality is noted.  No abnormal postcontrast enhancement tissue sampling.       2. 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    Findings discussed with the patient at length.   Recommend robotic-assisted, possible open, distal pancreatectomy and splenectomy  Risks of the procedure were explained to the patient

## 2021-08-04 ENCOUNTER — OFFICE VISIT (OUTPATIENT)
Dept: SURGERY | Facility: CLINIC | Age: 56
End: 2021-08-04
Payer: COMMERCIAL

## 2021-08-04 VITALS
OXYGEN SATURATION: 100 % | SYSTOLIC BLOOD PRESSURE: 110 MMHG | WEIGHT: 152 LBS | BODY MASS INDEX: 26.93 KG/M2 | DIASTOLIC BLOOD PRESSURE: 75 MMHG | RESPIRATION RATE: 16 BRPM | HEIGHT: 63 IN | HEART RATE: 75 BPM

## 2021-08-04 DIAGNOSIS — K86.89 PANCREATIC MASS: Primary | ICD-10-CM

## 2021-08-04 PROCEDURE — 3078F DIAST BP <80 MM HG: CPT | Performed by: SURGERY

## 2021-08-04 PROCEDURE — 3008F BODY MASS INDEX DOCD: CPT | Performed by: SURGERY

## 2021-08-04 PROCEDURE — 99245 OFF/OP CONSLTJ NEW/EST HI 55: CPT | Performed by: SURGERY

## 2021-08-04 PROCEDURE — 3074F SYST BP LT 130 MM HG: CPT | Performed by: SURGERY

## 2021-08-04 RX ORDER — METHYLDOPA/HYDROCHLOROTHIAZIDE 250MG-15MG
TABLET ORAL
COMMUNITY
End: 2021-08-25

## 2021-08-04 NOTE — PATIENT INSTRUCTIONS
Surgery: XI Robot-assisted distal pancreatectomy, splenectomy, possible open     Date of Surgery: [facility] [place]     Hospital:  [address] [place] [address]   Phone: [phone]  · This is an inpatient procedure.   · Use the provide Cardiac Clearance x [facility] Clearance     Jensen Nath M.D.   Complex Ladene Cornish Dr. Anthonette Angelucci nurse line:  [phone]  Monday through Friday 8:00am to 4:30pm.     For Dr. Anthonette Angelucci office: [phone]/

## 2021-08-07 DIAGNOSIS — G62.81 CRITICAL ILLNESS POLYNEUROPATHY (HCC): ICD-10-CM

## 2021-08-09 ENCOUNTER — TELEPHONE (OUTPATIENT)
Dept: PULMONOLOGY | Facility: CLINIC | Age: 56
End: 2021-08-09

## 2021-08-09 ENCOUNTER — TELEPHONE (OUTPATIENT)
Dept: INTERNAL MEDICINE CLINIC | Facility: CLINIC | Age: 56
End: 2021-08-09

## 2021-08-09 RX ORDER — GABAPENTIN 300 MG/1
300 CAPSULE ORAL 2 TIMES DAILY
Qty: 60 CAPSULE | Refills: 3 | OUTPATIENT
Start: 2021-08-09

## 2021-08-09 NOTE — TELEPHONE ENCOUNTER
Medication request: Gabapentin 300mg Take 1 capsule (300 mg total) by mouth 2 (two) times a day. LOV: 04/22/21  NOV: none    [place]: 06/11/21 #60 r-3    DENIED. Patient has refills.

## 2021-08-09 NOTE — TELEPHONE ENCOUNTER
Mother indicated that patient is having surgery with Dr Delphine Smith on 8/30/2021 for a pancreatic mass. Patient needs surgical clearance from Dr Leann Giron and Dr Teja Valles. Appointment made for 8/12/2021 at 4pm with Dr Leann Giron at [facility].      Advised mot

## 2021-08-09 NOTE — TELEPHONE ENCOUNTER
Pt mother Joey Santiago called in to schedule a pre-op appt for pt, pt is having surgery on August 30th. I checked the schedule it is booked out.  Please call

## 2021-08-11 NOTE — TELEPHONE ENCOUNTER
Spoke with patient's mother, Manolo Wiggins, appointment with Dr. Aimee Herrera for surgical clearance scheduled for 8/20/21 at 11:10am at the [facility] office. Verified date, time and location, Mother verbalized understanding.

## 2021-08-13 ENCOUNTER — TELEPHONE (OUTPATIENT)
Dept: SURGERY | Facility: CLINIC | Age: 56
End: 2021-08-13

## 2021-08-13 ENCOUNTER — TELEPHONE (OUTPATIENT)
Dept: INTERNAL MEDICINE CLINIC | Facility: CLINIC | Age: 56
End: 2021-08-13

## 2021-08-13 NOTE — TELEPHONE ENCOUNTER
Called and spoke with Shannon Edwards from Dr. Pastrana President office, Confirmed receipt of pre-op clearance letter and pre-splenectomy vaccine request letter. Pt has an appt in their office on 8/16/2021.  Re-iterated the importance of the pre-splenectomy vaccine admin

## 2021-08-13 NOTE — TELEPHONE ENCOUNTER
Loreto Nichole RN from [facility] calling to make sure Dr. Jesse Sommers has seen the letter she sent regarding needed vaccines for patient:    Letter by Fritz Gary RN on 8/5/2021                 [facility]  [facility], [address]

## 2021-08-13 NOTE — TELEPHONE ENCOUNTER
Noted  ,received a letter  Patient has appointment with me  on 8/16     Dr. Annamaria De La Torre -  -is it ok for patient to receive all vaccines at  same time ?   Thanks

## 2021-08-16 ENCOUNTER — OFFICE VISIT (OUTPATIENT)
Dept: INTERNAL MEDICINE CLINIC | Facility: CLINIC | Age: 56
End: 2021-08-16
Payer: COMMERCIAL

## 2021-08-16 ENCOUNTER — TELEPHONE (OUTPATIENT)
Dept: INTERNAL MEDICINE CLINIC | Facility: CLINIC | Age: 56
End: 2021-08-16

## 2021-08-16 VITALS
DIASTOLIC BLOOD PRESSURE: 83 MMHG | BODY MASS INDEX: 27.29 KG/M2 | OXYGEN SATURATION: 97 % | HEART RATE: 78 BPM | SYSTOLIC BLOOD PRESSURE: 129 MMHG | WEIGHT: 154 LBS | HEIGHT: 63 IN

## 2021-08-16 DIAGNOSIS — Z01.810 PREOP CARDIOVASCULAR EXAM: Primary | ICD-10-CM

## 2021-08-16 DIAGNOSIS — Z86.79 HX OF SINUS TACHYCARDIA: ICD-10-CM

## 2021-08-16 DIAGNOSIS — K86.89 PANCREATIC MASS: ICD-10-CM

## 2021-08-16 DIAGNOSIS — R93.3 ABNORMAL MAGNETIC RESONANCE CHOLANGIOPANCREATOGRAPHY (MRCP): ICD-10-CM

## 2021-08-16 PROCEDURE — 99214 OFFICE O/P EST MOD 30 MIN: CPT | Performed by: INTERNAL MEDICINE

## 2021-08-16 PROCEDURE — 90471 IMMUNIZATION ADMIN: CPT | Performed by: INTERNAL MEDICINE

## 2021-08-16 PROCEDURE — 90734 MENACWYD/MENACWYCRM VACC IM: CPT | Performed by: INTERNAL MEDICINE

## 2021-08-16 PROCEDURE — 3008F BODY MASS INDEX DOCD: CPT | Performed by: INTERNAL MEDICINE

## 2021-08-16 PROCEDURE — 3074F SYST BP LT 130 MM HG: CPT | Performed by: INTERNAL MEDICINE

## 2021-08-16 PROCEDURE — 90670 PCV13 VACCINE IM: CPT | Performed by: INTERNAL MEDICINE

## 2021-08-16 PROCEDURE — 3079F DIAST BP 80-89 MM HG: CPT | Performed by: INTERNAL MEDICINE

## 2021-08-16 PROCEDURE — 90472 IMMUNIZATION ADMIN EACH ADD: CPT | Performed by: INTERNAL MEDICINE

## 2021-08-16 NOTE — PROGRESS NOTES
HPI/Subjective:   Patient ID: Guerrero Seth is a 50year old female. Patient presents with:  Pre-Op Exam: Scheduled to have XI Robot-assisted distal pancreatectomy, splenectomy, possible open with Dr. Dilan Horvath on 8/30/21 at [address] [place] [address].     Haley Barron times daily with meals.  270 tablet 0   • omeprazole 20 MG Oral Capsule Delayed Release TAKE 1 CAPSULE BY MOUTH DAILY FOR ACID REFLUX AND HISTORY OF BLEEDING FROM YOUR GI TRACT 90 capsule 0   • metoprolol tartrate 25 MG Oral Tab TAKE 1/2 TABLET BY MOUTH TWI supple. Right lower leg: No edema. Left lower leg: No edema. Lymphadenopathy:      Cervical: No cervical adenopathy. Skin:     General: Skin is warm and dry.    Neurological:      Mental Status: She is alert and oriented to person, place, and that reason patient needs to get vaccines :   At least 2 weeks before surgery     #1 the 13 Valent pneumococcal conjugate vaccine PCV 13  ( Prevnar )-given   Followed by the 23 Valent pneumococcal polysaccharide vaccine PPV SV 23  (  Pneumovax )  8 weeks

## 2021-08-16 NOTE — TELEPHONE ENCOUNTER
Pt is being seen today by Dr. Nohelia Orr for pre op clearance. She is scheduled to have XI Robot-assisted distal pancreatectomy, splenectomy, possible open on 8/30/21 at [address] [place] [address] with Dr. Jesi Quezada. Office fax number [phone].

## 2021-08-17 ENCOUNTER — LAB ENCOUNTER (OUTPATIENT)
Dept: LAB | Age: 56
End: 2021-08-17
Attending: INTERNAL MEDICINE
Payer: COMMERCIAL

## 2021-08-17 ENCOUNTER — NURSE ONLY (OUTPATIENT)
Dept: INTERNAL MEDICINE CLINIC | Facility: CLINIC | Age: 56
End: 2021-08-17
Payer: COMMERCIAL

## 2021-08-17 DIAGNOSIS — Z23 IMMUNIZATION DUE: Primary | ICD-10-CM

## 2021-08-17 DIAGNOSIS — E11.9 TYPE 2 DIABETES MELLITUS WITHOUT COMPLICATION, WITHOUT LONG-TERM CURRENT USE OF INSULIN (HCC): ICD-10-CM

## 2021-08-17 DIAGNOSIS — Z01.810 PREOP CARDIOVASCULAR EXAM: ICD-10-CM

## 2021-08-17 DIAGNOSIS — I10 ESSENTIAL HYPERTENSION WITH GOAL BLOOD PRESSURE LESS THAN 130/80: ICD-10-CM

## 2021-08-17 PROBLEM — Z86.79 HX OF SINUS TACHYCARDIA: Status: ACTIVE | Noted: 2021-08-17

## 2021-08-17 LAB
ALBUMIN SERPL-MCNC: 3.7 G/DL (ref 3.4–5)
ALBUMIN/GLOB SERPL: 0.9 {RATIO} (ref 1–2)
ALP LIVER SERPL-CCNC: 89 U/L
ALT SERPL-CCNC: 12 U/L
ANION GAP SERPL CALC-SCNC: 4 MMOL/L (ref 0–18)
AST SERPL-CCNC: 11 U/L (ref 15–37)
BASOPHILS # BLD AUTO: 0.08 X10(3) UL (ref 0–0.2)
BASOPHILS NFR BLD AUTO: 1.3 %
BILIRUB SERPL-MCNC: 0.8 MG/DL (ref 0.1–2)
BUN BLD-MCNC: 18 MG/DL (ref 7–18)
BUN/CREAT SERPL: 23.1 (ref 10–20)
CALCIUM BLD-MCNC: 10 MG/DL (ref 8.5–10.1)
CHLORIDE SERPL-SCNC: 107 MMOL/L (ref 98–112)
CHOLEST SMN-MCNC: 269 MG/DL (ref ?–200)
CO2 SERPL-SCNC: 29 MMOL/L (ref 21–32)
CREAT BLD-MCNC: 0.78 MG/DL
DEPRECATED RDW RBC AUTO: 40.2 FL (ref 35.1–46.3)
EOSINOPHIL # BLD AUTO: 0.27 X10(3) UL (ref 0–0.7)
EOSINOPHIL NFR BLD AUTO: 4.5 %
ERYTHROCYTE [DISTWIDTH] IN BLOOD BY AUTOMATED COUNT: 12.5 % (ref 11–15)
EST. AVERAGE GLUCOSE BLD GHB EST-MCNC: 131 MG/DL (ref 68–126)
GLOBULIN PLAS-MCNC: 4 G/DL (ref 2.8–4.4)
GLUCOSE BLD-MCNC: 110 MG/DL (ref 70–99)
HBA1C MFR BLD HPLC: 6.2 % (ref ?–5.7)
HCT VFR BLD AUTO: 34.6 %
HDLC SERPL-MCNC: 60 MG/DL (ref 40–59)
HGB BLD-MCNC: 11.2 G/DL
IMM GRANULOCYTES # BLD AUTO: 0.01 X10(3) UL (ref 0–1)
IMM GRANULOCYTES NFR BLD: 0.2 %
LDLC SERPL CALC-MCNC: 180 MG/DL (ref ?–100)
LYMPHOCYTES # BLD AUTO: 1.59 X10(3) UL (ref 1–4)
LYMPHOCYTES NFR BLD AUTO: 26.6 %
M PROTEIN MFR SERPL ELPH: 7.7 G/DL (ref 6.4–8.2)
MCH RBC QN AUTO: 28.8 PG (ref 26–34)
MCHC RBC AUTO-ENTMCNC: 32.4 G/DL (ref 31–37)
MCV RBC AUTO: 88.9 FL
MONOCYTES # BLD AUTO: 0.32 X10(3) UL (ref 0.1–1)
MONOCYTES NFR BLD AUTO: 5.4 %
NEUTROPHILS # BLD AUTO: 3.71 X10 (3) UL (ref 1.5–7.7)
NEUTROPHILS # BLD AUTO: 3.71 X10(3) UL (ref 1.5–7.7)
NEUTROPHILS NFR BLD AUTO: 62 %
NONHDLC SERPL-MCNC: 209 MG/DL (ref ?–130)
OSMOLALITY SERPL CALC.SUM OF ELEC: 293 MOSM/KG (ref 275–295)
PATIENT FASTING Y/N/NP: YES
PATIENT FASTING Y/N/NP: YES
PLATELET # BLD AUTO: 230 10(3)UL (ref 150–450)
POTASSIUM SERPL-SCNC: 4.6 MMOL/L (ref 3.5–5.1)
RBC # BLD AUTO: 3.89 X10(6)UL
SODIUM SERPL-SCNC: 140 MMOL/L (ref 136–145)
TRIGL SERPL-MCNC: 157 MG/DL (ref 30–149)
TSI SER-ACNC: 3.24 MIU/ML (ref 0.36–3.74)
VLDLC SERPL CALC-MCNC: 32 MG/DL (ref 0–30)
WBC # BLD AUTO: 6 X10(3) UL (ref 4–11)

## 2021-08-17 PROCEDURE — 85025 COMPLETE CBC W/AUTO DIFF WBC: CPT

## 2021-08-17 PROCEDURE — 93010 ELECTROCARDIOGRAM REPORT: CPT | Performed by: INTERNAL MEDICINE

## 2021-08-17 PROCEDURE — 90472 IMMUNIZATION ADMIN EACH ADD: CPT | Performed by: INTERNAL MEDICINE

## 2021-08-17 PROCEDURE — 80053 COMPREHEN METABOLIC PANEL: CPT

## 2021-08-17 PROCEDURE — 90620 MENB-4C VACCINE IM: CPT | Performed by: INTERNAL MEDICINE

## 2021-08-17 PROCEDURE — 93005 ELECTROCARDIOGRAM TRACING: CPT

## 2021-08-17 PROCEDURE — 84443 ASSAY THYROID STIM HORMONE: CPT

## 2021-08-17 PROCEDURE — 90471 IMMUNIZATION ADMIN: CPT | Performed by: INTERNAL MEDICINE

## 2021-08-17 PROCEDURE — 80061 LIPID PANEL: CPT

## 2021-08-17 PROCEDURE — 3044F HG A1C LEVEL LT 7.0%: CPT | Performed by: INTERNAL MEDICINE

## 2021-08-17 PROCEDURE — 83036 HEMOGLOBIN GLYCOSYLATED A1C: CPT

## 2021-08-17 PROCEDURE — 90647 HIB PRP-OMP VACC 3 DOSE IM: CPT | Performed by: INTERNAL MEDICINE

## 2021-08-17 PROCEDURE — 36415 COLL VENOUS BLD VENIPUNCTURE: CPT

## 2021-08-17 NOTE — PROGRESS NOTES
Pt arrived for a nurse visit to receive Hib and Bexsero vaccines. Order can be seen in office notes 8/16/21. Pt tolerated injections well with no reactions.

## 2021-08-18 ENCOUNTER — TELEPHONE (OUTPATIENT)
Dept: PULMONOLOGY | Facility: CLINIC | Age: 56
End: 2021-08-18

## 2021-08-20 ENCOUNTER — OFFICE VISIT (OUTPATIENT)
Dept: PULMONOLOGY | Facility: CLINIC | Age: 56
End: 2021-08-20
Payer: COMMERCIAL

## 2021-08-20 ENCOUNTER — HOSPITAL ENCOUNTER (OUTPATIENT)
Dept: GENERAL RADIOLOGY | Age: 56
Discharge: HOME | End: 2021-08-20
Attending: INTERNAL MEDICINE
Payer: COMMERCIAL

## 2021-08-20 VITALS
HEART RATE: 95 BPM | BODY MASS INDEX: 26.58 KG/M2 | OXYGEN SATURATION: 99 % | SYSTOLIC BLOOD PRESSURE: 118 MMHG | RESPIRATION RATE: 19 BRPM | HEIGHT: 63 IN | DIASTOLIC BLOOD PRESSURE: 84 MMHG | WEIGHT: 150 LBS

## 2021-08-20 DIAGNOSIS — Z01.818 PREOPERATIVE CLEARANCE: ICD-10-CM

## 2021-08-20 DIAGNOSIS — Z01.818 PREOPERATIVE CLEARANCE: Primary | ICD-10-CM

## 2021-08-20 PROCEDURE — 3079F DIAST BP 80-89 MM HG: CPT | Performed by: INTERNAL MEDICINE

## 2021-08-20 PROCEDURE — 3074F SYST BP LT 130 MM HG: CPT | Performed by: INTERNAL MEDICINE

## 2021-08-20 PROCEDURE — 71046 X-RAY EXAM CHEST 2 VIEWS: CPT | Performed by: INTERNAL MEDICINE

## 2021-08-20 PROCEDURE — 99213 OFFICE O/P EST LOW 20 MIN: CPT | Performed by: INTERNAL MEDICINE

## 2021-08-20 PROCEDURE — 3008F BODY MASS INDEX DOCD: CPT | Performed by: INTERNAL MEDICINE

## 2021-08-20 NOTE — PROGRESS NOTES
Referring Physician  Erica Mcpherson MD    History of Present Illness  Patient presents today for follow-up visit to pulmonary clinic. States respiratory status gradually improving over the course of last several months.   Has not required supplemental lymphadenopathy     Assessment  1. History of severe COVID-19 pneumonia  2. Dyspnea with exertion  3. ILD    Plan  -Patient presents today for pulmonary follow-up evaluation.   Prolonged hospitalization secondary to severe COVID-19 in October 2020 requir

## 2021-08-23 DIAGNOSIS — E11.9 TYPE 2 DIABETES MELLITUS WITHOUT COMPLICATION, WITHOUT LONG-TERM CURRENT USE OF INSULIN (HCC): ICD-10-CM

## 2021-08-24 ENCOUNTER — LAB ENCOUNTER (OUTPATIENT)
Dept: LAB | Age: 56
End: 2021-08-24
Attending: INTERNAL MEDICINE
Payer: COMMERCIAL

## 2021-08-24 ENCOUNTER — HOSPITAL ENCOUNTER (OUTPATIENT)
Dept: CV DIAGNOSTICS | Facility: HOSPITAL | Age: 56
Discharge: HOME | End: 2021-08-24
Attending: INTERNAL MEDICINE
Payer: COMMERCIAL

## 2021-08-24 ENCOUNTER — TELEPHONE (OUTPATIENT)
Dept: INTERNAL MEDICINE CLINIC | Facility: CLINIC | Age: 56
End: 2021-08-24

## 2021-08-24 DIAGNOSIS — Z01.818 PREOP TESTING: ICD-10-CM

## 2021-08-24 DIAGNOSIS — Z01.810 PREOP CARDIOVASCULAR EXAM: ICD-10-CM

## 2021-08-24 DIAGNOSIS — I10 ESSENTIAL HYPERTENSION WITH GOAL BLOOD PRESSURE LESS THAN 130/80: ICD-10-CM

## 2021-08-24 DIAGNOSIS — E11.9 TYPE 2 DIABETES MELLITUS WITHOUT COMPLICATION, WITHOUT LONG-TERM CURRENT USE OF INSULIN (HCC): ICD-10-CM

## 2021-08-24 DIAGNOSIS — Z86.79 HX OF SINUS TACHYCARDIA: ICD-10-CM

## 2021-08-24 DIAGNOSIS — Z01.810 PREOP CARDIOVASCULAR EXAM: Primary | ICD-10-CM

## 2021-08-24 DIAGNOSIS — D64.9 ANEMIA, UNSPECIFIED TYPE: ICD-10-CM

## 2021-08-24 LAB
BILIRUB UR QL: NEGATIVE
COLOR UR: YELLOW
DEPRECATED HBV CORE AB SER IA-ACNC: 122.8 NG/ML
FOLATE SERPL-MCNC: 12 NG/ML (ref 8.7–?)
GLUCOSE UR-MCNC: NEGATIVE MG/DL
HGB UR QL STRIP.AUTO: NEGATIVE
IRON SATURATION: 18 %
IRON SERPL-MCNC: 59 UG/DL
KETONES UR-MCNC: NEGATIVE MG/DL
LEUKOCYTE ESTERASE UR QL STRIP.AUTO: NEGATIVE
NITRITE UR QL STRIP.AUTO: NEGATIVE
PH UR: 5 [PH] (ref 5–8)
PROT UR-MCNC: NEGATIVE MG/DL
SP GR UR STRIP: 1.01 (ref 1–1.03)
TOTAL IRON BINDING CAPACITY: 325 UG/DL (ref 240–450)
TRANSFERRIN SERPL-MCNC: 218 MG/DL (ref 200–360)
UROBILINOGEN UR STRIP-ACNC: <2
VIT B12 SERPL-MCNC: 1062 PG/ML (ref 193–986)

## 2021-08-24 PROCEDURE — 82746 ASSAY OF FOLIC ACID SERUM: CPT

## 2021-08-24 PROCEDURE — 81003 URINALYSIS AUTO W/O SCOPE: CPT

## 2021-08-24 PROCEDURE — 87086 URINE CULTURE/COLONY COUNT: CPT

## 2021-08-24 PROCEDURE — 36415 COLL VENOUS BLD VENIPUNCTURE: CPT

## 2021-08-24 PROCEDURE — 93306 TTE W/DOPPLER COMPLETE: CPT | Performed by: INTERNAL MEDICINE

## 2021-08-24 PROCEDURE — 84466 ASSAY OF TRANSFERRIN: CPT

## 2021-08-24 PROCEDURE — 82728 ASSAY OF FERRITIN: CPT

## 2021-08-24 PROCEDURE — 82607 VITAMIN B-12: CPT

## 2021-08-24 PROCEDURE — 83540 ASSAY OF IRON: CPT

## 2021-08-27 ENCOUNTER — TELEPHONE (OUTPATIENT)
Dept: INTERNAL MEDICINE CLINIC | Facility: CLINIC | Age: 56
End: 2021-08-27

## 2021-08-27 ENCOUNTER — LAB ENCOUNTER (OUTPATIENT)
Dept: LAB | Age: 56
End: 2021-08-27
Attending: SURGERY
Payer: COMMERCIAL

## 2021-08-27 DIAGNOSIS — Z01.818 PREOPERATIVE TESTING: ICD-10-CM

## 2021-08-27 LAB
ANTIBODY SCREEN: NEGATIVE
RH BLOOD TYPE: POSITIVE

## 2021-08-27 PROCEDURE — 36415 COLL VENOUS BLD VENIPUNCTURE: CPT

## 2021-08-27 PROCEDURE — 86850 RBC ANTIBODY SCREEN: CPT

## 2021-08-27 PROCEDURE — 86901 BLOOD TYPING SEROLOGIC RH(D): CPT

## 2021-08-27 PROCEDURE — 86900 BLOOD TYPING SEROLOGIC ABO: CPT

## 2021-08-27 PROCEDURE — 87641 MR-STAPH DNA AMP PROBE: CPT

## 2021-08-27 NOTE — TELEPHONE ENCOUNTER
Faxed office notes, clearance letter, lab results (8/17, 8/24), ekg, and echo results to [phone]. Rec'd fax confirmation.

## 2021-08-28 DIAGNOSIS — G62.81 CRITICAL ILLNESS POLYNEUROPATHY (HCC): ICD-10-CM

## 2021-08-28 LAB
MRSA DNA SPEC QL NAA+PROBE: NEGATIVE
SARS-COV-2 RNA RESP QL NAA+PROBE: NOT DETECTED

## 2021-08-28 RX ORDER — GABAPENTIN 300 MG/1
300 CAPSULE ORAL 2 TIMES DAILY
Qty: 60 CAPSULE | Refills: 3 | Status: CANCELLED | OUTPATIENT
Start: 2021-08-28

## 2021-08-30 ENCOUNTER — ANESTHESIA (OUTPATIENT)
Dept: SURGERY | Facility: HOSPITAL | Age: 56
DRG: 405 | End: 2021-08-30
Payer: COMMERCIAL

## 2021-08-30 ENCOUNTER — APPOINTMENT (OUTPATIENT)
Dept: GENERAL RADIOLOGY | Facility: HOSPITAL | Age: 56
DRG: 405 | End: 2021-08-30
Attending: SURGERY
Payer: COMMERCIAL

## 2021-08-30 ENCOUNTER — HOSPITAL ENCOUNTER (INPATIENT)
Facility: HOSPITAL | Age: 56
LOS: 4 days | Discharge: HOME HEALTH | DRG: 405 | End: 2021-09-03
Attending: SURGERY | Admitting: SURGERY
Payer: COMMERCIAL

## 2021-08-30 ENCOUNTER — ANESTHESIA EVENT (OUTPATIENT)
Dept: SURGERY | Facility: HOSPITAL | Age: 56
DRG: 405 | End: 2021-08-30
Payer: COMMERCIAL

## 2021-08-30 DIAGNOSIS — Z01.818 PREOPERATIVE TESTING: Primary | ICD-10-CM

## 2021-08-30 DIAGNOSIS — K86.89 PANCREATIC MASS: ICD-10-CM

## 2021-08-30 PROBLEM — K86.2 CYSTIC MASS OF PANCREAS: Status: ACTIVE | Noted: 2021-08-30

## 2021-08-30 LAB
ALBUMIN SERPL-MCNC: 2.9 G/DL (ref 3.4–5)
ALBUMIN/GLOB SERPL: 1.5 {RATIO} (ref 1–2)
ALP LIVER SERPL-CCNC: 40 U/L
ALT SERPL-CCNC: 13 U/L
ANION GAP SERPL CALC-SCNC: 10 MMOL/L (ref 0–18)
APTT PPP: 31.3 SECONDS (ref 23.2–35.3)
AST SERPL-CCNC: 19 U/L (ref 15–37)
B-HCG UR QL: NEGATIVE
BILIRUB SERPL-MCNC: 0.7 MG/DL (ref 0.1–2)
BUN BLD-MCNC: 18 MG/DL (ref 7–18)
BUN/CREAT SERPL: 13.8 (ref 10–20)
CALCIUM BLD-MCNC: 6.8 MG/DL (ref 8.5–10.1)
CHLORIDE SERPL-SCNC: 116 MMOL/L (ref 98–112)
CO2 SERPL-SCNC: 18 MMOL/L (ref 21–32)
CREAT BLD-MCNC: 1.3 MG/DL
DEPRECATED RDW RBC AUTO: 46.5 FL (ref 35.1–46.3)
ERYTHROCYTE [DISTWIDTH] IN BLOOD BY AUTOMATED COUNT: 14.2 % (ref 11–15)
GLOBULIN PLAS-MCNC: 2 G/DL (ref 2.8–4.4)
GLUCOSE BLD-MCNC: 225 MG/DL (ref 70–99)
GLUCOSE BLDC GLUCOMTR-MCNC: 125 MG/DL (ref 70–99)
GLUCOSE BLDC GLUCOMTR-MCNC: 213 MG/DL (ref 70–99)
GLUCOSE BLDC GLUCOMTR-MCNC: 229 MG/DL (ref 70–99)
GLUCOSE BLDC GLUCOMTR-MCNC: 249 MG/DL (ref 70–99)
HCT VFR BLD AUTO: 24.8 %
HCT VFR BLD AUTO: 25.6 %
HCT VFR BLD AUTO: 31.4 %
HGB BLD-MCNC: 8.4 G/DL
HGB BLD-MCNC: 8.6 G/DL
HGB BLD-MCNC: 9.6 G/DL
INR BLD: 1.24 (ref 0.9–1.2)
LACTATE SERPL-SCNC: 2.2 MMOL/L (ref 0.4–2)
M PROTEIN MFR SERPL ELPH: 4.9 G/DL (ref 6.4–8.2)
MCH RBC QN AUTO: 29.7 PG (ref 26–34)
MCHC RBC AUTO-ENTMCNC: 32.8 G/DL (ref 31–37)
MCV RBC AUTO: 90.5 FL
OSMOLALITY SERPL CALC.SUM OF ELEC: 307 MOSM/KG (ref 275–295)
PLATELET # BLD AUTO: 108 10(3)UL (ref 150–450)
POCT HEMOCUE: 9.5 (ref 12–16)
POTASSIUM SERPL-SCNC: 3.8 MMOL/L (ref 3.5–5.1)
PROTHROMBIN TIME: 15.4 SECONDS (ref 11.8–14.5)
RBC # BLD AUTO: 2.83 X10(6)UL
SODIUM SERPL-SCNC: 144 MMOL/L (ref 136–145)
WBC # BLD AUTO: 23.7 X10(3) UL (ref 4–11)

## 2021-08-30 PROCEDURE — 0FBG0ZZ EXCISION OF PANCREAS, OPEN APPROACH: ICD-10-PCS | Performed by: SURGERY

## 2021-08-30 PROCEDURE — 71045 X-RAY EXAM CHEST 1 VIEW: CPT | Performed by: SURGERY

## 2021-08-30 PROCEDURE — 0DB60ZZ EXCISION OF STOMACH, OPEN APPROACH: ICD-10-PCS | Performed by: SURGERY

## 2021-08-30 PROCEDURE — 8E0W0CZ ROBOTIC ASSISTED PROCEDURE OF TRUNK REGION, OPEN APPROACH: ICD-10-PCS | Performed by: SURGERY

## 2021-08-30 PROCEDURE — 30233N1 TRANSFUSION OF NONAUTOLOGOUS RED BLOOD CELLS INTO PERIPHERAL VEIN, PERCUTANEOUS APPROACH: ICD-10-PCS | Performed by: SURGERY

## 2021-08-30 PROCEDURE — 36430 TRANSFUSION BLD/BLD COMPNT: CPT | Performed by: GENERAL ACUTE CARE HOSPITAL

## 2021-08-30 PROCEDURE — 07TP0ZZ RESECTION OF SPLEEN, OPEN APPROACH: ICD-10-PCS | Performed by: SURGERY

## 2021-08-30 PROCEDURE — 30233L1 TRANSFUSION OF NONAUTOLOGOUS FRESH PLASMA INTO PERIPHERAL VEIN, PERCUTANEOUS APPROACH: ICD-10-PCS | Performed by: SURGERY

## 2021-08-30 PROCEDURE — 30233K1 TRANSFUSION OF NONAUTOLOGOUS FROZEN PLASMA INTO PERIPHERAL VEIN, PERCUTANEOUS APPROACH: ICD-10-PCS | Performed by: SURGERY

## 2021-08-30 RX ORDER — PANTOPRAZOLE SODIUM 20 MG/1
20 TABLET, DELAYED RELEASE ORAL
Status: DISCONTINUED | OUTPATIENT
Start: 2021-08-31 | End: 2021-09-03

## 2021-08-30 RX ORDER — OXYCODONE HYDROCHLORIDE 5 MG/1
5 TABLET ORAL EVERY 4 HOURS PRN
Status: DISCONTINUED | OUTPATIENT
Start: 2021-08-30 | End: 2021-09-03

## 2021-08-30 RX ORDER — HYDROCODONE BITARTRATE AND ACETAMINOPHEN 5; 325 MG/1; MG/1
1 TABLET ORAL AS NEEDED
Status: DISCONTINUED | OUTPATIENT
Start: 2021-08-30 | End: 2021-08-30 | Stop reason: HOSPADM

## 2021-08-30 RX ORDER — SODIUM CHLORIDE, SODIUM LACTATE, POTASSIUM CHLORIDE, CALCIUM CHLORIDE 600; 310; 30; 20 MG/100ML; MG/100ML; MG/100ML; MG/100ML
INJECTION, SOLUTION INTRAVENOUS CONTINUOUS
Status: DISCONTINUED | OUTPATIENT
Start: 2021-08-30 | End: 2021-09-01

## 2021-08-30 RX ORDER — HALOPERIDOL 5 MG/ML
0.25 INJECTION INTRAMUSCULAR ONCE AS NEEDED
Status: DISCONTINUED | OUTPATIENT
Start: 2021-08-30 | End: 2021-08-30 | Stop reason: HOSPADM

## 2021-08-30 RX ORDER — MORPHINE SULFATE 4 MG/ML
2 INJECTION, SOLUTION INTRAMUSCULAR; INTRAVENOUS EVERY 10 MIN PRN
Status: DISCONTINUED | OUTPATIENT
Start: 2021-08-30 | End: 2021-08-30 | Stop reason: HOSPADM

## 2021-08-30 RX ORDER — DEXTROSE MONOHYDRATE 25 G/50ML
50 INJECTION, SOLUTION INTRAVENOUS
Status: DISCONTINUED | OUTPATIENT
Start: 2021-08-30 | End: 2021-08-30 | Stop reason: HOSPADM

## 2021-08-30 RX ORDER — HYDROMORPHONE HYDROCHLORIDE 1 MG/ML
0.4 INJECTION, SOLUTION INTRAMUSCULAR; INTRAVENOUS; SUBCUTANEOUS EVERY 5 MIN PRN
Status: DISCONTINUED | OUTPATIENT
Start: 2021-08-30 | End: 2021-08-30 | Stop reason: HOSPADM

## 2021-08-30 RX ORDER — LIDOCAINE HYDROCHLORIDE 10 MG/ML
INJECTION, SOLUTION EPIDURAL; INFILTRATION; INTRACAUDAL; PERINEURAL AS NEEDED
Status: DISCONTINUED | OUTPATIENT
Start: 2021-08-30 | End: 2021-08-30 | Stop reason: SURG

## 2021-08-30 RX ORDER — DEXTROSE MONOHYDRATE 25 G/50ML
50 INJECTION, SOLUTION INTRAVENOUS
Status: DISCONTINUED | OUTPATIENT
Start: 2021-08-30 | End: 2021-09-03

## 2021-08-30 RX ORDER — HYDROMORPHONE HYDROCHLORIDE 1 MG/ML
0.2 INJECTION, SOLUTION INTRAMUSCULAR; INTRAVENOUS; SUBCUTANEOUS EVERY 5 MIN PRN
Status: DISCONTINUED | OUTPATIENT
Start: 2021-08-30 | End: 2021-08-30 | Stop reason: HOSPADM

## 2021-08-30 RX ORDER — SODIUM CHLORIDE, SODIUM LACTATE, POTASSIUM CHLORIDE, CALCIUM CHLORIDE 600; 310; 30; 20 MG/100ML; MG/100ML; MG/100ML; MG/100ML
INJECTION, SOLUTION INTRAVENOUS CONTINUOUS
Status: DISCONTINUED | OUTPATIENT
Start: 2021-08-30 | End: 2021-08-31

## 2021-08-30 RX ORDER — SODIUM CHLORIDE, SODIUM LACTATE, POTASSIUM CHLORIDE, CALCIUM CHLORIDE 600; 310; 30; 20 MG/100ML; MG/100ML; MG/100ML; MG/100ML
INJECTION, SOLUTION INTRAVENOUS CONTINUOUS
Status: DISCONTINUED | OUTPATIENT
Start: 2021-08-30 | End: 2021-08-30 | Stop reason: HOSPADM

## 2021-08-30 RX ORDER — PROCHLORPERAZINE EDISYLATE 5 MG/ML
5 INJECTION INTRAMUSCULAR; INTRAVENOUS ONCE AS NEEDED
Status: COMPLETED | OUTPATIENT
Start: 2021-08-30 | End: 2021-08-30

## 2021-08-30 RX ORDER — ROCURONIUM BROMIDE 10 MG/ML
INJECTION, SOLUTION INTRAVENOUS AS NEEDED
Status: DISCONTINUED | OUTPATIENT
Start: 2021-08-30 | End: 2021-08-30 | Stop reason: SURG

## 2021-08-30 RX ORDER — INSULIN ASPART 100 [IU]/ML
INJECTION, SOLUTION INTRAVENOUS; SUBCUTANEOUS ONCE
Status: COMPLETED | OUTPATIENT
Start: 2021-08-30 | End: 2021-08-30

## 2021-08-30 RX ORDER — MIDAZOLAM HYDROCHLORIDE 1 MG/ML
INJECTION INTRAMUSCULAR; INTRAVENOUS AS NEEDED
Status: DISCONTINUED | OUTPATIENT
Start: 2021-08-30 | End: 2021-08-30 | Stop reason: SURG

## 2021-08-30 RX ORDER — MORPHINE SULFATE 4 MG/ML
4 INJECTION, SOLUTION INTRAMUSCULAR; INTRAVENOUS EVERY 10 MIN PRN
Status: DISCONTINUED | OUTPATIENT
Start: 2021-08-30 | End: 2021-08-30 | Stop reason: HOSPADM

## 2021-08-30 RX ORDER — SODIUM CHLORIDE 9 MG/ML
INJECTION, SOLUTION INTRAVENOUS CONTINUOUS PRN
Status: DISCONTINUED | OUTPATIENT
Start: 2021-08-30 | End: 2021-08-30 | Stop reason: SURG

## 2021-08-30 RX ORDER — SODIUM CHLORIDE 9 MG/ML
INJECTION, SOLUTION INTRAVENOUS ONCE
Status: COMPLETED | OUTPATIENT
Start: 2021-08-30 | End: 2021-08-30

## 2021-08-30 RX ORDER — ALBUMIN, HUMAN INJ 5% 5 %
250 SOLUTION INTRAVENOUS ONCE
Status: COMPLETED | OUTPATIENT
Start: 2021-08-30 | End: 2021-08-30

## 2021-08-30 RX ORDER — HYDROMORPHONE HYDROCHLORIDE 1 MG/ML
0.6 INJECTION, SOLUTION INTRAMUSCULAR; INTRAVENOUS; SUBCUTANEOUS EVERY 5 MIN PRN
Status: DISCONTINUED | OUTPATIENT
Start: 2021-08-30 | End: 2021-08-30 | Stop reason: HOSPADM

## 2021-08-30 RX ORDER — ONDANSETRON 2 MG/ML
INJECTION INTRAMUSCULAR; INTRAVENOUS AS NEEDED
Status: DISCONTINUED | OUTPATIENT
Start: 2021-08-30 | End: 2021-08-30 | Stop reason: SURG

## 2021-08-30 RX ORDER — HYDROCODONE BITARTRATE AND ACETAMINOPHEN 5; 325 MG/1; MG/1
2 TABLET ORAL AS NEEDED
Status: DISCONTINUED | OUTPATIENT
Start: 2021-08-30 | End: 2021-08-30 | Stop reason: HOSPADM

## 2021-08-30 RX ORDER — GABAPENTIN 300 MG/1
300 CAPSULE ORAL 2 TIMES DAILY
Status: DISCONTINUED | OUTPATIENT
Start: 2021-08-31 | End: 2021-09-03

## 2021-08-30 RX ORDER — ENOXAPARIN SODIUM 100 MG/ML
40 INJECTION SUBCUTANEOUS DAILY
Status: DISCONTINUED | OUTPATIENT
Start: 2021-08-31 | End: 2021-09-03

## 2021-08-30 RX ORDER — HEPARIN SODIUM 5000 [USP'U]/ML
5000 INJECTION, SOLUTION INTRAVENOUS; SUBCUTANEOUS
Status: COMPLETED | OUTPATIENT
Start: 2021-08-30 | End: 2021-08-30

## 2021-08-30 RX ORDER — ONDANSETRON 2 MG/ML
4 INJECTION INTRAMUSCULAR; INTRAVENOUS EVERY 6 HOURS PRN
Status: DISCONTINUED | OUTPATIENT
Start: 2021-08-30 | End: 2021-09-03

## 2021-08-30 RX ORDER — METOPROLOL TARTRATE 5 MG/5ML
2.5 INJECTION INTRAVENOUS ONCE
Status: DISCONTINUED | OUTPATIENT
Start: 2021-08-30 | End: 2021-08-30 | Stop reason: HOSPADM

## 2021-08-30 RX ORDER — CEFOXITIN 2 G/1
INJECTION, POWDER, FOR SOLUTION INTRAVENOUS AS NEEDED
Status: DISCONTINUED | OUTPATIENT
Start: 2021-08-30 | End: 2021-08-30 | Stop reason: SURG

## 2021-08-30 RX ORDER — ACETAMINOPHEN 10 MG/ML
1000 INJECTION, SOLUTION INTRAVENOUS EVERY 6 HOURS
Status: DISCONTINUED | OUTPATIENT
Start: 2021-08-30 | End: 2021-09-01

## 2021-08-30 RX ORDER — SODIUM CHLORIDE 9 MG/ML
INJECTION, SOLUTION INTRAVENOUS ONCE
Status: DISCONTINUED | OUTPATIENT
Start: 2021-08-30 | End: 2021-08-31

## 2021-08-30 RX ORDER — PHENYLEPHRINE HCL 10 MG/ML
VIAL (ML) INJECTION AS NEEDED
Status: DISCONTINUED | OUTPATIENT
Start: 2021-08-30 | End: 2021-08-30 | Stop reason: SURG

## 2021-08-30 RX ORDER — DEXAMETHASONE SODIUM PHOSPHATE 4 MG/ML
VIAL (ML) INJECTION AS NEEDED
Status: DISCONTINUED | OUTPATIENT
Start: 2021-08-30 | End: 2021-08-30 | Stop reason: SURG

## 2021-08-30 RX ORDER — ACETAMINOPHEN 500 MG
1000 TABLET ORAL ONCE
Status: COMPLETED | OUTPATIENT
Start: 2021-08-30 | End: 2021-08-30

## 2021-08-30 RX ORDER — METOPROLOL TARTRATE 5 MG/5ML
5 INJECTION INTRAVENOUS EVERY 6 HOURS PRN
Status: DISCONTINUED | OUTPATIENT
Start: 2021-08-30 | End: 2021-09-03

## 2021-08-30 RX ORDER — ONDANSETRON 2 MG/ML
4 INJECTION INTRAMUSCULAR; INTRAVENOUS ONCE AS NEEDED
Status: COMPLETED | OUTPATIENT
Start: 2021-08-30 | End: 2021-08-30

## 2021-08-30 RX ORDER — NALOXONE HYDROCHLORIDE 0.4 MG/ML
80 INJECTION, SOLUTION INTRAMUSCULAR; INTRAVENOUS; SUBCUTANEOUS AS NEEDED
Status: DISCONTINUED | OUTPATIENT
Start: 2021-08-30 | End: 2021-08-30 | Stop reason: HOSPADM

## 2021-08-30 RX ORDER — SODIUM CHLORIDE 9 MG/ML
INJECTION, SOLUTION INTRAVENOUS CONTINUOUS
Status: DISCONTINUED | OUTPATIENT
Start: 2021-08-30 | End: 2021-08-31

## 2021-08-30 RX ORDER — MORPHINE SULFATE 10 MG/ML
6 INJECTION, SOLUTION INTRAMUSCULAR; INTRAVENOUS EVERY 10 MIN PRN
Status: DISCONTINUED | OUTPATIENT
Start: 2021-08-30 | End: 2021-08-30 | Stop reason: HOSPADM

## 2021-08-30 RX ADMIN — DEXAMETHASONE SODIUM PHOSPHATE 8 MG: 4 MG/ML VIAL (ML) INJECTION at 12:44:00

## 2021-08-30 RX ADMIN — PHENYLEPHRINE HCL 100 MCG: 10 MG/ML VIAL (ML) INJECTION at 13:08:00

## 2021-08-30 RX ADMIN — SODIUM CHLORIDE: 9 INJECTION, SOLUTION INTRAVENOUS at 16:29:00

## 2021-08-30 RX ADMIN — PHENYLEPHRINE HCL 100 MCG: 10 MG/ML VIAL (ML) INJECTION at 13:35:00

## 2021-08-30 RX ADMIN — ROCURONIUM BROMIDE 10 MG: 10 INJECTION, SOLUTION INTRAVENOUS at 14:21:00

## 2021-08-30 RX ADMIN — PHENYLEPHRINE HCL 100 MCG: 10 MG/ML VIAL (ML) INJECTION at 16:43:00

## 2021-08-30 RX ADMIN — SODIUM CHLORIDE, SODIUM LACTATE, POTASSIUM CHLORIDE, CALCIUM CHLORIDE: 600; 310; 30; 20 INJECTION, SOLUTION INTRAVENOUS at 16:08:00

## 2021-08-30 RX ADMIN — ROCURONIUM BROMIDE 10 MG: 10 INJECTION, SOLUTION INTRAVENOUS at 13:45:00

## 2021-08-30 RX ADMIN — SODIUM CHLORIDE: 9 INJECTION, SOLUTION INTRAVENOUS at 12:44:00

## 2021-08-30 RX ADMIN — PHENYLEPHRINE HCL 50 MCG: 10 MG/ML VIAL (ML) INJECTION at 12:59:00

## 2021-08-30 RX ADMIN — CEFOXITIN 2 G: 2 INJECTION, POWDER, FOR SOLUTION INTRAVENOUS at 16:34:00

## 2021-08-30 RX ADMIN — CEFOXITIN 2 G: 2 INJECTION, POWDER, FOR SOLUTION INTRAVENOUS at 14:38:00

## 2021-08-30 RX ADMIN — SODIUM CHLORIDE: 9 INJECTION, SOLUTION INTRAVENOUS at 17:38:00

## 2021-08-30 RX ADMIN — ROCURONIUM BROMIDE 50 MG: 10 INJECTION, SOLUTION INTRAVENOUS at 12:39:00

## 2021-08-30 RX ADMIN — ONDANSETRON 4 MG: 2 INJECTION INTRAMUSCULAR; INTRAVENOUS at 12:44:00

## 2021-08-30 RX ADMIN — ROCURONIUM BROMIDE 10 MG: 10 INJECTION, SOLUTION INTRAVENOUS at 16:05:00

## 2021-08-30 RX ADMIN — SODIUM CHLORIDE, SODIUM LACTATE, POTASSIUM CHLORIDE, CALCIUM CHLORIDE: 600; 310; 30; 20 INJECTION, SOLUTION INTRAVENOUS at 12:33:00

## 2021-08-30 RX ADMIN — SODIUM CHLORIDE: 9 INJECTION, SOLUTION INTRAVENOUS at 15:58:00

## 2021-08-30 RX ADMIN — MIDAZOLAM HYDROCHLORIDE 2 MG: 1 INJECTION INTRAMUSCULAR; INTRAVENOUS at 12:33:00

## 2021-08-30 RX ADMIN — LIDOCAINE HYDROCHLORIDE 50 MG: 10 INJECTION, SOLUTION EPIDURAL; INFILTRATION; INTRACAUDAL; PERINEURAL at 12:39:00

## 2021-08-30 RX ADMIN — SODIUM CHLORIDE: 9 INJECTION, SOLUTION INTRAVENOUS at 14:38:00

## 2021-08-30 RX ADMIN — CEFOXITIN 2 G: 2 INJECTION, POWDER, FOR SOLUTION INTRAVENOUS at 12:44:00

## 2021-08-30 RX ADMIN — ROCURONIUM BROMIDE 10 MG: 10 INJECTION, SOLUTION INTRAVENOUS at 15:11:00

## 2021-08-30 RX ADMIN — PHENYLEPHRINE HCL 100 MCG: 10 MG/ML VIAL (ML) INJECTION at 14:21:00

## 2021-08-30 RX ADMIN — PHENYLEPHRINE HCL 100 MCG: 10 MG/ML VIAL (ML) INJECTION at 14:06:00

## 2021-08-30 RX ADMIN — PHENYLEPHRINE HCL 100 MCG: 10 MG/ML VIAL (ML) INJECTION at 14:51:00

## 2021-08-30 RX ADMIN — SODIUM CHLORIDE: 9 INJECTION, SOLUTION INTRAVENOUS at 16:41:00

## 2021-08-30 NOTE — ANESTHESIA POSTPROCEDURE EVALUATION
Patient: Manjinder Guevara    Procedure Summary     Date: 08/30/21 Room / Location: [address] [place] [address] MAIN OR 06 / [address] [place] [address] OR    Anesthesia Start: 0916 Anesthesia Stop: 4099    Procedure: XI ROBOTIC LAPAROSCOPIC CONVERTED TO OPEN DISTAL PANCREATECTOMY, SPLENECTOMY,

## 2021-08-30 NOTE — ANESTHESIA PROCEDURE NOTES
Airway  Date/Time: 8/30/2021 12:42 PM  Urgency: elective    Airway not difficult    General Information and Staff    Patient location during procedure: OR  Anesthesiologist: Vandana Grayson DO  Resident/CRNA: Jacinta Carolina CRNA  Performed: CRNA     Ind

## 2021-08-30 NOTE — TELEPHONE ENCOUNTER
Medication request: Gabapentin 300mg    LOV: 06/11/21  NOV: 09/15/21    [place]: 06/11/21 #60 r-3    Patient states she is taking the medication BID to TID. S/w [facility] who states the patient just picked up a refill. Denied.

## 2021-08-30 NOTE — ANESTHESIA POSTPROCEDURE EVALUATION
Patient: Guerrero Seth    Procedure Summary     Date: 08/30/21 Room / Location: [address] [place] [address] MAIN OR 06 / [address] [place] [address] OR    Anesthesia Start: 7737 Anesthesia Stop: 9860    Procedure: XI ROBOTIC LAPAROSCOPIC CONVERTED TO OPEN DISTAL PANCREATECTOMY, SPLENECTOMY, show

## 2021-08-30 NOTE — BRIEF OP NOTE
Pre-Operative Diagnosis: Pancreatic mass [K86.89]     Post-Operative Diagnosis: Pancreatic mass [K86.89]      Procedure Performed:   Robotic assisted converted to open distal pancreatectomy, splenectomy, partial gastrectomy, takedown of splenic flexure

## 2021-08-30 NOTE — INTERVAL H&P NOTE
Patient seen and examined. No changes to the attached history and physical are noted. 50year old female presenting today for planned robotic-assisted, possible open distal pancreatectomy, splenectomy.     Cortney Ford PA-C    Department of [facility]

## 2021-08-30 NOTE — ANESTHESIA PREPROCEDURE EVALUATION
Anesthesia PreOp Note    HPI:     Genia Lorenzo is a 50year old female who presents for preoperative consultation requested by: Dalia Harkins MD    Date of Surgery: 8/30/2021    Procedure(s):  XI ROBOT-ASSISTED LAPAROSCOPIC DISTAL PANCREATECTOMY YOUR GI TRACT, Disp: 90 capsule, Rfl: 0, 8/30/2021 at Unknown time  gabapentin 300 MG Oral Cap, Take 1 capsule (300 mg total) by mouth 2 (two) times a day.  (Patient taking differently: Take 300 mg by mouth 3 (three) times daily.  ), Disp: 60 capsule, Rfl: Caffeine Concern: Yes          3 cups weekly        Occupational Exposure: Not Asked        Hobby Hazards: Not Asked        Sleep Concern: Not Asked        Stress Concern: Not Asked        Weight Concern: Not Asked        Special Diet: Not Asked        Adi Escobedo 08/17/2021    PGLU 125 (H) 08/30/2021    CA 10.0 08/17/2021          Vital Signs: Body mass index is 26.22 kg/m². height is 1.6 m (5' 3\") and weight is 67.1 kg (148 lb). Her oral temperature is 98.4 °F (36.9 °C).  Her blood pressure is 117/70 and her pu

## 2021-08-31 LAB
ALBUMIN SERPL-MCNC: 3.3 G/DL (ref 3.4–5)
ALBUMIN/GLOB SERPL: 1.3 {RATIO} (ref 1–2)
ALP LIVER SERPL-CCNC: 45 U/L
ALT SERPL-CCNC: 18 U/L
ANION GAP SERPL CALC-SCNC: 4 MMOL/L (ref 0–18)
AST SERPL-CCNC: 28 U/L (ref 15–37)
BILIRUB SERPL-MCNC: 0.8 MG/DL (ref 0.1–2)
BUN BLD-MCNC: 16 MG/DL (ref 7–18)
BUN/CREAT SERPL: 21.3 (ref 10–20)
CALCIUM BLD-MCNC: 7.6 MG/DL (ref 8.5–10.1)
CHLORIDE SERPL-SCNC: 113 MMOL/L (ref 98–112)
CO2 SERPL-SCNC: 25 MMOL/L (ref 21–32)
CREAT BLD-MCNC: 0.75 MG/DL
DEPRECATED RDW RBC AUTO: 42.6 FL (ref 35.1–46.3)
ERYTHROCYTE [DISTWIDTH] IN BLOOD BY AUTOMATED COUNT: 14 % (ref 11–15)
GLOBULIN PLAS-MCNC: 2.6 G/DL (ref 2.8–4.4)
GLUCOSE BLD-MCNC: 151 MG/DL (ref 70–99)
GLUCOSE BLDC GLUCOMTR-MCNC: 115 MG/DL (ref 70–99)
GLUCOSE BLDC GLUCOMTR-MCNC: 123 MG/DL (ref 70–99)
GLUCOSE BLDC GLUCOMTR-MCNC: 139 MG/DL (ref 70–99)
GLUCOSE BLDC GLUCOMTR-MCNC: 147 MG/DL (ref 70–99)
GLUCOSE BLDC GLUCOMTR-MCNC: 162 MG/DL (ref 70–99)
HAV IGM SER QL: 1.7 MG/DL (ref 1.6–2.6)
HCT VFR BLD AUTO: 21.3 %
HCT VFR BLD AUTO: 22.2 %
HCT VFR BLD AUTO: 26 %
HCT VFR FLD CALC: 7.2 %
HGB BLD-MCNC: 7.1 G/DL
HGB BLD-MCNC: 7.7 G/DL
HGB BLD-MCNC: 8.9 G/DL
LACTATE SERPL-SCNC: 1.1 MMOL/L (ref 0.4–2)
M PROTEIN MFR SERPL ELPH: 5.9 G/DL (ref 6.4–8.2)
MCH RBC QN AUTO: 29.6 PG (ref 26–34)
MCHC RBC AUTO-ENTMCNC: 34.7 G/DL (ref 31–37)
MCV RBC AUTO: 85.4 FL
OSMOLALITY SERPL CALC.SUM OF ELEC: 298 MOSM/KG (ref 275–295)
PHOSPHATE SERPL-MCNC: 4.5 MG/DL (ref 2.5–4.9)
PLATELET # BLD AUTO: 175 10(3)UL (ref 150–450)
POTASSIUM SERPL-SCNC: 4.3 MMOL/L (ref 3.5–5.1)
RBC # BLD AUTO: 2.6 X10(6)UL
SODIUM SERPL-SCNC: 142 MMOL/L (ref 136–145)
WBC # BLD AUTO: 15.8 X10(3) UL (ref 4–11)

## 2021-08-31 PROCEDURE — 3008F BODY MASS INDEX DOCD: CPT | Performed by: INTERNAL MEDICINE

## 2021-08-31 PROCEDURE — 99233 SBSQ HOSP IP/OBS HIGH 50: CPT | Performed by: INTERNAL MEDICINE

## 2021-08-31 PROCEDURE — 3074F SYST BP LT 130 MM HG: CPT | Performed by: INTERNAL MEDICINE

## 2021-08-31 PROCEDURE — 3078F DIAST BP <80 MM HG: CPT | Performed by: INTERNAL MEDICINE

## 2021-08-31 RX ORDER — SODIUM CHLORIDE 9 MG/ML
INJECTION, SOLUTION INTRAVENOUS ONCE
Status: DISCONTINUED | OUTPATIENT
Start: 2021-08-31 | End: 2021-09-03

## 2021-08-31 RX ORDER — MAGNESIUM SULFATE HEPTAHYDRATE 40 MG/ML
2 INJECTION, SOLUTION INTRAVENOUS ONCE
Status: COMPLETED | OUTPATIENT
Start: 2021-08-31 | End: 2021-08-31

## 2021-08-31 NOTE — PLAN OF CARE
Patient doing better throughout the day. Lovenox held for low Hgb. Patient received 1 unit PRBC and Hgb rechecked. Patient got up to chair and worked with PT/OT. Left radial art line removed without issue. Padilla in place to monitor I&O's.  Family at bedside Diabetes/Glucose Control  Goal: Glucose maintained within prescribed range  Description: INTERVENTIONS:  - Monitor Blood Glucose as ordered  - Assess for signs and symptoms of hyperglycemia and hypoglycemia  - Administer ordered medications to maintain glu infection  Description: INTERVENTIONS:  - Assess and document risk factors for pressure ulcer development  - Assess and document skin integrity  - Assess and document dressing/incision, wound bed, drain sites and surrounding tissue  - Implement wound care

## 2021-08-31 NOTE — PROGRESS NOTES
ICU admission note    Patient admitted to the ICU for serial hemoglobin and monitoring after undergoing robotic assisted converted to open distal pancreatectomy, splenectomy and partial gastrectomy and takedown of the splenic flexure due to a pancreatic ma

## 2021-08-31 NOTE — ADDENDUM NOTE
Addendum  created 08/30/21 2012 by Raleigh Sorensen MD    Child order released for a procedure order, Clinical Note Signed, Intraprocedure Blocks edited

## 2021-08-31 NOTE — ANESTHESIA PROCEDURE NOTES
Arterial Line  Performed by: Chetan De Anda MD  Authorized by: Eren Espinosa MD     General Information and Staff    Procedure Start:  8/30/2021 6:40 PM  Procedure End:  8/30/2021 6:45 PM  Anesthesiologist:  Chetan De Anda MD  Performed By:  Jayro Church

## 2021-08-31 NOTE — OPERATIVE REPORT
Date of the operation 8/30/2021    Preoperative diagnosis:  1.   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  Robotic assisted converted to open distal pancreatectomy, en bloc partial gastrectomy a Right lower quadrant 8 mm, infraumbilical 8 mm, left hemiabdominal 8 mm and an intervening left sided 12 mm port. In the suprapubic area, a 5 mm assistant port was placed. The patient was then repositioned into reverse Trendelenburg.   The robot was docke plane anterior to the transverse mesocolon and follow it superiorly however could not identify the splenic vein and artery posteriorly in the usual manner. At that point, dissection near the neck of the pancreas identified the SMV/portal vein.   Superior t pancreas laterally to the left side, the splenic vein was identified. It was encircled and taken with a white load vascular 60 mm stapler.   Throughout, I ensured adequate blood supply to the liver using the Doppler signal and through the left gastric carly

## 2021-08-31 NOTE — PROGRESS NOTES
I was called to evaluate the patient in the postoperative anesthesia care unit. She is 1 hour postop from robotic converted to open distal pancreatectomy splenectomy and partial gastrectomy.   Patient was notably hypotensive with a blood pressure katja césar

## 2021-08-31 NOTE — ADDENDUM NOTE
Addendum  created 08/30/21 1915 by Jayla Vu MD    Child order released for a procedure order, Clinical Note Signed, Intraprocedure Blocks edited, LDA created via procedure documentation

## 2021-08-31 NOTE — ANESTHESIA PROCEDURE NOTES
Central Line  Performed by: Mika Benton MD  Authorized by: Mika Benton MD     General Information and Staff    Procedure Start:  8/30/2021 6:30 PM  Procedure End:  8/30/2021 6:37 PM  Anesthesiologist:  Mika Benton MD  Performed by:   An

## 2021-08-31 NOTE — OCCUPATIONAL THERAPY NOTE
OCCUPATIONAL THERAPY EVALUATION - INPATIENT     Room Number: 224/224-A  Evaluation Date: 8/31/2021  Type of Evaluation: Initial  Presenting Problem:  (cystic mass of pancreas)    Physician Order: IP Consult to Occupational Therapy  Reason for Therapy: ADL/ Balance activities; Energy conservation/work simplification techniques;ADL training;Functional transfer training; Endurance training;Patient/Family education;Patient/Family training;Equipment eval/education; Compensatory technique education       OCCUPATIONAL : A Little  -   Putting on and taking off regular upper body clothing?: A Little  -   Taking care of personal grooming such as brushing teeth?: None  -   Eating meals?: None    AM-PAC Score:  Score: 18  Approx Degree of Impairment: 46.65%  Standardized Sco

## 2021-08-31 NOTE — PROGRESS NOTES
Surgical Oncology Inpatient Progress Note    Subjective:  POD 1 Robotic assisted converted to open distal pancreatectomy, splenectomy, partial gastrectomy, takedown of splenic flexure  Hypotension in PACU, code blue called, responded to norepinephrine  Sta management: ketamine, PCA, IV tylenol, po oxy prn  - PT/OT to evaluate and treat  - Ambulate/up to chair TID  - Appreciate recs from medical team  - Incentive spirometry and pulmonary toilet  - Follow surgical oncology urine output protocol and [facility] electro

## 2021-08-31 NOTE — PROGRESS NOTES
1225: Results of ISTAT reported to Dr Vivian Lawson. Dr. Vivian Lawson called for update and notified that KIARA drainage sent to lab was unable to be process for Hgb and needed to be sent out.  Dr. Vivian Lawson wanted ISTAT test so Flor from hematology notified of ch

## 2021-08-31 NOTE — PROGRESS NOTES
[facility]    Progress Note    Phan Sterling Patient Status:  Inpatient    10/18/1965 MRN [id number]   Location [facility] 2W/SW Attending Jackson Spence MD   Hosp Day # 1 PCP Alida Damian MD     CC: Abdominal p 29. 6  --    MCHC 32.8  --   --  34.7  --    RDW 14.2  --   --  14.0  --    WBC 23.7*  --   --  15.8*  --    .0*  --   --  175.0  --     < > = values in this interval not displayed.      Recent Labs   Lab 08/30/21  1851 08/31/21  0455   * 151* requiring pressor support.  -Continue to monitor    ENMA   -resolving  - Cont IVF  - Avoiding Nephrotoxic agents  - Cont to monitor      Quality:    · DVT Prophylaxis: SCDs, holding due to anemia as above  · CODE status: Full   · Dispo: per clinical course

## 2021-09-01 LAB
ALBUMIN SERPL-MCNC: 3 G/DL (ref 3.4–5)
ALBUMIN/GLOB SERPL: 1.1 {RATIO} (ref 1–2)
ALP LIVER SERPL-CCNC: 62 U/L
ALT SERPL-CCNC: 17 U/L
AMYLASE FLD-CCNC: 68 U/L
AMYLASE SERPL-CCNC: 35 U/L (ref 25–115)
ANION GAP SERPL CALC-SCNC: 4 MMOL/L (ref 0–18)
AST SERPL-CCNC: 32 U/L (ref 15–37)
BILIRUB SERPL-MCNC: 0.9 MG/DL (ref 0.1–2)
BLOOD TYPE BARCODE: 5100
BLOOD TYPE BARCODE: 7300
BUN BLD-MCNC: 10 MG/DL (ref 7–18)
BUN/CREAT SERPL: 16.4 (ref 10–20)
CALCIUM BLD-MCNC: 8.3 MG/DL (ref 8.5–10.1)
CHLORIDE SERPL-SCNC: 108 MMOL/L (ref 98–112)
CO2 SERPL-SCNC: 29 MMOL/L (ref 21–32)
CREAT BLD-MCNC: 0.61 MG/DL
DEPRECATED RDW RBC AUTO: 44 FL (ref 35.1–46.3)
ERYTHROCYTE [DISTWIDTH] IN BLOOD BY AUTOMATED COUNT: 13.9 % (ref 11–15)
GLOBULIN PLAS-MCNC: 2.8 G/DL (ref 2.8–4.4)
GLUCOSE BLD-MCNC: 117 MG/DL (ref 70–99)
GLUCOSE BLDC GLUCOMTR-MCNC: 120 MG/DL (ref 70–99)
GLUCOSE BLDC GLUCOMTR-MCNC: 124 MG/DL (ref 70–99)
GLUCOSE BLDC GLUCOMTR-MCNC: 128 MG/DL (ref 70–99)
GLUCOSE BLDC GLUCOMTR-MCNC: 138 MG/DL (ref 70–99)
GLUCOSE BLDC GLUCOMTR-MCNC: 144 MG/DL (ref 70–99)
HAV IGM SER QL: 2.1 MG/DL (ref 1.6–2.6)
HCT VFR BLD AUTO: 25.3 %
HGB BLD-MCNC: 10.2 G/DL
HGB BLD-MCNC: 8.6 G/DL
M PROTEIN MFR SERPL ELPH: 5.8 G/DL (ref 6.4–8.2)
MCH RBC QN AUTO: 29.8 PG (ref 26–34)
MCHC RBC AUTO-ENTMCNC: 34 G/DL (ref 31–37)
MCV RBC AUTO: 87.5 FL
OSMOLALITY SERPL CALC.SUM OF ELEC: 292 MOSM/KG (ref 275–295)
PHOSPHATE SERPL-MCNC: 2.3 MG/DL (ref 2.5–4.9)
PLATELET # BLD AUTO: 162 10(3)UL (ref 150–450)
POTASSIUM SERPL-SCNC: 3.8 MMOL/L (ref 3.5–5.1)
RBC # BLD AUTO: 2.89 X10(6)UL
SODIUM SERPL-SCNC: 141 MMOL/L (ref 136–145)
WBC # BLD AUTO: 11 X10(3) UL (ref 4–11)

## 2021-09-01 PROCEDURE — 99233 SBSQ HOSP IP/OBS HIGH 50: CPT | Performed by: HOSPITALIST

## 2021-09-01 RX ORDER — ACETAMINOPHEN 500 MG
500 TABLET ORAL EVERY 6 HOURS PRN
Status: DISCONTINUED | OUTPATIENT
Start: 2021-09-01 | End: 2021-09-03

## 2021-09-01 RX ORDER — TRAMADOL HYDROCHLORIDE 50 MG/1
50 TABLET ORAL EVERY 6 HOURS PRN
Status: DISCONTINUED | OUTPATIENT
Start: 2021-09-01 | End: 2021-09-03

## 2021-09-01 RX ORDER — DEXTROSE, SODIUM CHLORIDE, AND POTASSIUM CHLORIDE 5; .45; .15 G/100ML; G/100ML; G/100ML
INJECTION INTRAVENOUS CONTINUOUS
Status: DISCONTINUED | OUTPATIENT
Start: 2021-09-01 | End: 2021-09-02

## 2021-09-01 NOTE — PROGRESS NOTES
Surgical Oncology Inpatient Progress Note    Subjective:  POD 2 Robotic assisted converted to open distal pancreatectomy, splenectomy, partial gastrectomy, takedown of splenic flexure  ANALI, VSS  Pain controlled  Denies nausea/vomiting  Passed some flatus t AAOx3, MAEW    Assessment/Plan:  POD 2 from Robotic assisted converted to open distal pancreatectomy, splenectomy, partial gastrectomy, takedown of splenic flexure  Hypotension in PACU, code blue called. Responded to pressors.    Acute anemia s/p 1 unit PRB

## 2021-09-01 NOTE — PHYSICAL THERAPY NOTE
Pt was to be seen for PT treatment session. Consulted w/ OT and RN Yasir. OT had just tried to see pt, but she declined OOB participation as she had just returned back to bed and requested that therapy return tomorrow.     Will re-attempt tomorrow if appropr

## 2021-09-01 NOTE — OCCUPATIONAL THERAPY NOTE
KAYLYNN Cooley cleared pt for OT session, however pt declined at this time. Pt reports she has just gotten into bed and is comfortable but having pain and requesting pain medication. Pt was educated on role of OT and adaptive ADLs.  Pt is agreeable to do therapy t

## 2021-09-01 NOTE — PROGRESS NOTES
Pt transferred from 2nd floor. A/O x4, VSS. KIARA drain to bulb suction, dressing changed. Surgical incision C/D/I. IVF continued. Dilaudid PCA for pain management. Passing gas. Randy houston'alisa this AM, per Yasir/RN pt voided twice prior to transfer to 4th floor.  P

## 2021-09-01 NOTE — PROGRESS NOTES
Patient cleared to transfer to 4th floor. Transfer skin check done with second RN, Ivanna Diggs. Report given to Michelle at extension [phone]. Patient transferring to room 467.

## 2021-09-01 NOTE — PROGRESS NOTES
[facility]    Progress Note    Miya Lam Patient Status:  Inpatient    10/18/1965 MRN [id number]   Location [facility] 2W/SW Attending Grant Cunningham MD   Hosp Day # 2 PCP Marely Coronado MD       Subjective: partial gastrectomy, takedown of splenic flexure  - pain control  - wean dilaudid gtt  - mobilize  - soft diet  - IS  - wean IVF     Postoperative anemia - stable  Hemorrhagic shock - resolved  Hypotension - resolved  Off levophed.   - s/p 4 units prbc  - m

## 2021-09-01 NOTE — PLAN OF CARE
Problem: Diabetes/Glucose Control  Goal: Glucose maintained within prescribed range  Description: INTERVENTIONS:  - Monitor Blood Glucose as ordered  - Assess for signs and symptoms of hyperglycemia and hypoglycemia  - Administer ordered medications to m bleeding or hemorrhage  - Monitor labs and vital signs for trends  - Administer supportive blood products/factors, fluids and medications as ordered and appropriate  - Administer supportive blood products/factors as ordered and appropriate  Outcome: Aurelia

## 2021-09-01 NOTE — PLAN OF CARE
Pt alert and oriented x4. Remains on PCA pump but patient agreeable to weaning to oral meds instead. Oxycodone given x2 for pain control. Padilla and central line taken out, patient tolerated well. Patient able to ambulate in room and to the bathroom.  Jo Rachel barriers to adequate nutritional intake and initiate nutrition consult as needed  - Instruct patient on self management of diabetes  Outcome: Progressing     Problem: GASTROINTESTINAL - ADULT  Goal: Minimal or absence of nausea and vomiting  Description: I ADULT  Goal: Maintains hematologic stability  Description: INTERVENTIONS  - Assess for signs and symptoms of bleeding or hemorrhage  - Monitor labs and vital signs for trends  - Administer supportive blood products/factors, fluids and medications as ordere

## 2021-09-01 NOTE — CM/SW NOTE
09/01/21 1100   CM/SW Referral Data   Referral Source    Reason for Referral Discharge planning   Informant Spouse/Significant Other   Patient Info   Patient's Current Mental Status at Time of Assessment Oriented   Patient's Home Environment

## 2021-09-02 ENCOUNTER — TELEPHONE (OUTPATIENT)
Dept: INTERNAL MEDICINE CLINIC | Facility: CLINIC | Age: 56
End: 2021-09-02

## 2021-09-02 LAB
ANION GAP SERPL CALC-SCNC: 4 MMOL/L (ref 0–18)
BUN BLD-MCNC: 9 MG/DL (ref 7–18)
BUN/CREAT SERPL: 14.5 (ref 10–20)
CALCIUM BLD-MCNC: 8.5 MG/DL (ref 8.5–10.1)
CHLORIDE SERPL-SCNC: 107 MMOL/L (ref 98–112)
CO2 SERPL-SCNC: 29 MMOL/L (ref 21–32)
CREAT BLD-MCNC: 0.62 MG/DL
DEPRECATED RDW RBC AUTO: 44.1 FL (ref 35.1–46.3)
ERYTHROCYTE [DISTWIDTH] IN BLOOD BY AUTOMATED COUNT: 13.6 % (ref 11–15)
GLUCOSE BLD-MCNC: 130 MG/DL (ref 70–99)
GLUCOSE BLDC GLUCOMTR-MCNC: 112 MG/DL (ref 70–99)
GLUCOSE BLDC GLUCOMTR-MCNC: 133 MG/DL (ref 70–99)
GLUCOSE BLDC GLUCOMTR-MCNC: 148 MG/DL (ref 70–99)
GLUCOSE BLDC GLUCOMTR-MCNC: 168 MG/DL (ref 70–99)
HCT VFR BLD AUTO: 25.7 %
HGB BLD-MCNC: 8.6 G/DL
MCH RBC QN AUTO: 30 PG (ref 26–34)
MCHC RBC AUTO-ENTMCNC: 33.5 G/DL (ref 31–37)
MCV RBC AUTO: 89.5 FL
OSMOLALITY SERPL CALC.SUM OF ELEC: 290 MOSM/KG (ref 275–295)
PHOSPHATE SERPL-MCNC: 3.2 MG/DL (ref 2.5–4.9)
PLATELET # BLD AUTO: 203 10(3)UL (ref 150–450)
POTASSIUM SERPL-SCNC: 3.7 MMOL/L (ref 3.5–5.1)
RBC # BLD AUTO: 2.87 X10(6)UL
SODIUM SERPL-SCNC: 140 MMOL/L (ref 136–145)
WBC # BLD AUTO: 13 X10(3) UL (ref 4–11)

## 2021-09-02 PROCEDURE — 99233 SBSQ HOSP IP/OBS HIGH 50: CPT | Performed by: HOSPITALIST

## 2021-09-02 NOTE — PLAN OF CARE
Problem: Patient Centered Care  Goal: Patient preferences are identified and integrated in the patient's plan of care  Description: Interventions:  - What would you like us to know as we care for you?  I had covid back in December and was in the hospital needed  - Instruct patient on self management of diabetes  9/2/2021 1407 by Bennett Carrasco RN  Outcome: Progressing  9/2/2021 1407 by Bennett Carrasco RN  Outcome: Progressing     Problem: GASTROINTESTINAL - ADULT  Goal: Minimal or absence of nausea and sites and surrounding tissue  - Implement wound care per orders  - Initiate isolation precautions as appropriate  - Initiate Pressure Ulcer prevention bundle as indicated  Outcome: Progressing     Problem: HEMATOLOGIC - ADULT  Goal: Maintains hematologic s appropriate  Outcome: Progressing     Plan of care followed. Medications given via MAR. PRN given as needed for pain control. Tolerating diet. Voiding. Blood sugar monitored. Surgical dressing in place, abdominal binder for support.    Plan of care

## 2021-09-02 NOTE — PROGRESS NOTES
Surgical Oncology Inpatient Progress Note    Subjective:  POD 3 Robotic assisted converted to open distal pancreatectomy, splenectomy, partial gastrectomy, takedown of splenic flexure  ANALI, mild tachycardia  Pain controlled  Denies nausea/vomiting.  Poor ap GARY    Assessment/Plan:  POD 3 from Robotic assisted converted to open distal pancreatectomy, splenectomy, partial gastrectomy, takedown of splenic flexure  Hypotension in PACU, code blue called. Responded to pressors.  BP stable  Acute anemia s/p 1 unit P

## 2021-09-02 NOTE — CM/SW NOTE
Received call from [facility] from [facility]- they were reviewing financials and may be able to accept case. Reviewed Aidin referral and window closed with no providers. Reopened window.     / to remain available for support and/or

## 2021-09-02 NOTE — OCCUPATIONAL THERAPY NOTE
OCCUPATIONAL THERAPY TREATMENT NOTE - INPATIENT        Room Number: 467/467-A  Presenting Problem:  (cystic mass of pancreas)    Problem List  Active Problems:    Cystic mass of pancreas    Preoperative testing    OCCUPATIONAL THERAPY ASSESSMENT     RN con Putting on and taking off regular lower body clothing?: A Little  -   Bathing (including washing, rinsing, drying)?: A Lot  -   Toileting, which includes using toilet, bedpan or urinal? : A Little  -   Putting on and taking off regular upper body clothing?

## 2021-09-02 NOTE — PHYSICAL THERAPY NOTE
PHYSICAL THERAPY TREATMENT NOTE - INPATIENT     Room Number: 467/467-A       Presenting Problem: s/p robotic assisted converted to open distal pancreatectomy, splenectomy, partial gastrectomy, takedown of splenic flexure on 8/30/21; CODE Blue called in [facility] training;Coordination; Body mechanics; Endurance; Patient education; Family education;Stair training;Balance training;Strengthening;Energy conservation    SUBJECTIVE  \"I think I look better today then yesterday. \"    OBJECTIVE  Precautions: Drain(s) (abdomina concerns addressed; Family present    CURRENT GOALS   Goals to be met by: 9/14/21  Patient Goal Patient's self-stated goal is: not stated   Goal #1 Patient is able to demonstrate supine - sit EOB @ level: modified independent     Goal #1   Current Status Mi

## 2021-09-02 NOTE — PROGRESS NOTES
[facility]    Progress Note    Jeff Cordero Patient Status:  Inpatient    10/18/1965 MRN [id number]   Location [facility] 4W/SW/SE Attending Megan Pan MD   Hosp Day # 3 PCP Danilo Correa MD       Subjective: minutes spent with >50% of time spent on counseling and coordination of care.     Vera Benoit MD  9/2/2021

## 2021-09-02 NOTE — CM/SW NOTE
Per chart review, home health referral was initiated in [address] [place] [address] yesterday. Upon review, there are no accepting home health agencies as of yet. SW extended deadline and expanded referral.       PLAN: PT rec home health, patient agreeable.  Referrals extended i

## 2021-09-02 NOTE — PLAN OF CARE
No acute medical changes during the shift. Patient is A&O x4. Prn tramadol and oxy-IR for pain relief. Dilaudid pca in place; patient not using. Ambulating 1x with walker. Voiding freely. Inicisons c/d/I. KIARA drain intact and draining. Accuchecks ACHS.  Sodi hyperglycemia and hypoglycemia  - Administer ordered medications to maintain glucose within target range  - Assess barriers to adequate nutritional intake and initiate nutrition consult as needed  - Instruct patient on self management of diabetes  Outcome: products/factors, fluids and medications as ordered and appropriate  - Administer supportive blood products/factors as ordered and appropriate  Outcome: Progressing     Problem: PAIN - ADULT  Goal: Verbalizes/displays adequate comfort level or patient's st

## 2021-09-03 VITALS
HEART RATE: 99 BPM | HEIGHT: 63 IN | WEIGHT: 155.63 LBS | BODY MASS INDEX: 27.57 KG/M2 | RESPIRATION RATE: 18 BRPM | DIASTOLIC BLOOD PRESSURE: 71 MMHG | OXYGEN SATURATION: 93 % | TEMPERATURE: 98 F | SYSTOLIC BLOOD PRESSURE: 105 MMHG

## 2021-09-03 LAB
ANION GAP SERPL CALC-SCNC: 4 MMOL/L (ref 0–18)
BUN BLD-MCNC: 9 MG/DL (ref 7–18)
BUN/CREAT SERPL: 16.4 (ref 10–20)
CALCIUM BLD-MCNC: 8.4 MG/DL (ref 8.5–10.1)
CHLORIDE SERPL-SCNC: 105 MMOL/L (ref 98–112)
CO2 SERPL-SCNC: 29 MMOL/L (ref 21–32)
CREAT BLD-MCNC: 0.55 MG/DL
DEPRECATED RDW RBC AUTO: 43.4 FL (ref 35.1–46.3)
ERYTHROCYTE [DISTWIDTH] IN BLOOD BY AUTOMATED COUNT: 13.2 % (ref 11–15)
GLUCOSE BLD-MCNC: 124 MG/DL (ref 70–99)
GLUCOSE BLDC GLUCOMTR-MCNC: 138 MG/DL (ref 70–99)
HAV IGM SER QL: 2 MG/DL (ref 1.6–2.6)
HCT VFR BLD AUTO: 26.5 %
HGB BLD-MCNC: 8.8 G/DL
MCH RBC QN AUTO: 29.7 PG (ref 26–34)
MCHC RBC AUTO-ENTMCNC: 33.2 G/DL (ref 31–37)
MCV RBC AUTO: 89.5 FL
OSMOLALITY SERPL CALC.SUM OF ELEC: 286 MOSM/KG (ref 275–295)
PHOSPHATE SERPL-MCNC: 3.3 MG/DL (ref 2.5–4.9)
PLATELET # BLD AUTO: 283 10(3)UL (ref 150–450)
POTASSIUM SERPL-SCNC: 3.4 MMOL/L (ref 3.5–5.1)
RBC # BLD AUTO: 2.96 X10(6)UL
SODIUM SERPL-SCNC: 138 MMOL/L (ref 136–145)
WBC # BLD AUTO: 9.9 X10(3) UL (ref 4–11)

## 2021-09-03 PROCEDURE — 99239 HOSP IP/OBS DSCHRG MGMT >30: CPT | Performed by: HOSPITALIST

## 2021-09-03 RX ORDER — POTASSIUM CHLORIDE 20 MEQ/1
40 TABLET, EXTENDED RELEASE ORAL ONCE
Status: COMPLETED | OUTPATIENT
Start: 2021-09-03 | End: 2021-09-03

## 2021-09-03 RX ORDER — TRAMADOL HYDROCHLORIDE 50 MG/1
50 TABLET ORAL EVERY 6 HOURS PRN
Qty: 20 TABLET | Refills: 0 | Status: SHIPPED | OUTPATIENT
Start: 2021-09-03

## 2021-09-03 RX ORDER — ACETAMINOPHEN 500 MG
1000 TABLET ORAL EVERY 6 HOURS PRN
Qty: 60 TABLET | Refills: 2 | Status: SHIPPED | OUTPATIENT
Start: 2021-09-03

## 2021-09-03 RX ORDER — ONDANSETRON 4 MG/1
4 TABLET, ORALLY DISINTEGRATING ORAL EVERY 4 HOURS PRN
Qty: 10 TABLET | Refills: 0 | Status: SHIPPED | OUTPATIENT
Start: 2021-09-03 | End: 2021-09-15

## 2021-09-03 NOTE — DISCHARGE SUMMARY
[facility]    Discharge Summary    Sonny Jackson Patient Status:  Inpatient    10/18/1965 MRN [id number]   Location [facility] 4W/SW/SE Attending Ifrah Floyd MD   Hosp Day # 4 PCP Erica Mcpherson MD     Date of A prolonged rehab in December now with interstitial lung disease, T2DM, who is being evaluated for the above reason.   During patient's admission for COVID-19 at [facility] in Oct 2020, CT chest revealed a 9.7 x 7.7 cm cystic mass in distal pancre recovered quickly. Hemorrhagic shock - resolved  Hypotension - resolved  Off levophed.   - s/p 4 units prbc  - monitor CBC - hgb stable  - off IVF     ENMA   Resolved with IVF.     dvt proph:    lovenox     Code status:    Full       Consultations:   Dread Jacob Microlet Lancets Misc      Check sugars 2 times  Daily and as needed   Quantity: 200 each  Refills: 1     omeprazole 20 MG Cpdr  Commonly known as: PRILOSEC      TAKE 1 CAPSULE BY MOUTH DAILY FOR ACID REFLUX AND HISTORY OF BLEEDING FROM YOUR GI TRACT   Q

## 2021-09-03 NOTE — CM/SW NOTE
Patient is accepted with [facility], orders sent. Met with patient at bedside to notify, discussed home health care process, patient expressed understanding. SW notified Gregg Willett today.      PLAN: Home w [facility], orders/f2

## 2021-09-03 NOTE — PROGRESS NOTES
Surgical Oncology Inpatient Progress Note    Subjective:  POD 4 Robotic assisted converted to open distal pancreatectomy, splenectomy, partial gastrectomy, takedown of splenic flexure  ANALI, mild tachycardia  Pain controlled  Denies nausea/vomiting.  Poor ap flexure  Hypotension in PACU, code blue called. Responded to pressors. BP stable  Acute anemia s/p 1 unit PRBC on 8/31.  Hgb stable     - Diet: soft diet  - Pain management: po tylenol, po tramadol  - PT/OT to evaluate and treat  - Ambulate/up to chair TID

## 2021-09-03 NOTE — PLAN OF CARE
No acute medical changes during the shift. Patient is A&O x4. Prn tramadol and tylenol pain relief. Ambulating 1x with walker. Voiding freely. Inicisons c/d/I. Accuchecks ACHS. Remote tele in place. LF/S diet with poor appetite. Snacks offered.  Plan of car target range  - Assess barriers to adequate nutritional intake and initiate nutrition consult as needed  - Instruct patient on self management of diabetes  Outcome: Progressing     Problem: GASTROINTESTINAL - ADULT  Goal: Minimal or absence of nausea and v supportive blood products/factors as ordered and appropriate  Outcome: Progressing     Problem: PAIN - ADULT  Goal: Verbalizes/displays adequate comfort level or patient's stated pain goal  Description: INTERVENTIONS:  - Encourage pt to monitor pain and re

## 2021-09-03 NOTE — PLAN OF CARE
Pt A/O x4, VSS. Tramadol PRN for pain. Ambulating with assist. Voiding. Tolerating diet, poor appetite, accu-checks AC/HS. K+replaced per protocol. Surgical incision intact; abd binder in place. Tachy with activity. Fall precautions in place.  Call light wi within target range  - Assess barriers to adequate nutritional intake and initiate nutrition consult as needed  - Instruct patient on self management of diabetes  Outcome: Adequate for Discharge     Problem: GASTROINTESTINAL - ADULT  Goal: Minimal or absen prevention bundle as indicated  Outcome: Adequate for Discharge     Problem: HEMATOLOGIC - ADULT  Goal: Maintains hematologic stability  Description: INTERVENTIONS  - Assess for signs and symptoms of bleeding or hemorrhage  - Monitor labs and vital signs f

## 2021-09-07 ENCOUNTER — TELEPHONE (OUTPATIENT)
Dept: SURGERY | Facility: CLINIC | Age: 56
End: 2021-09-07

## 2021-09-07 NOTE — TELEPHONE ENCOUNTER
Called to check on patient after discharge Friday. She is doing well. Denies fevers, chills, nausea, vomiting. Pain is controlled. Tolerating po. Has appt tomorrow with Dr. Chetan De Anda.     El Munoz PA-C    Department of Surgical Oncology  [facility]

## 2021-09-08 ENCOUNTER — OFFICE VISIT (OUTPATIENT)
Dept: SURGERY | Facility: CLINIC | Age: 56
End: 2021-09-08
Payer: COMMERCIAL

## 2021-09-08 VITALS
OXYGEN SATURATION: 99 % | SYSTOLIC BLOOD PRESSURE: 123 MMHG | HEIGHT: 63 IN | RESPIRATION RATE: 18 BRPM | BODY MASS INDEX: 25.69 KG/M2 | DIASTOLIC BLOOD PRESSURE: 77 MMHG | WEIGHT: 145 LBS | TEMPERATURE: 98 F | HEART RATE: 87 BPM

## 2021-09-08 DIAGNOSIS — K86.89 PANCREATIC MASS: Primary | ICD-10-CM

## 2021-09-08 PROCEDURE — 3074F SYST BP LT 130 MM HG: CPT | Performed by: SURGERY

## 2021-09-08 PROCEDURE — 99024 POSTOP FOLLOW-UP VISIT: CPT | Performed by: SURGERY

## 2021-09-08 PROCEDURE — 3008F BODY MASS INDEX DOCD: CPT | Performed by: SURGERY

## 2021-09-08 PROCEDURE — 3078F DIAST BP <80 MM HG: CPT | Performed by: SURGERY

## 2021-09-09 ENCOUNTER — TELEPHONE (OUTPATIENT)
Dept: HEMATOLOGY/ONCOLOGY | Facility: HOSPITAL | Age: 56
End: 2021-09-09

## 2021-09-09 NOTE — TELEPHONE ENCOUNTER
SW received paperwork for the pt's work Leave of Absence. Pt under the care of Dr. Kodi Kapoor for pancreatic mass. Pt with surgical interventions by Dr. Kami Tam with hospitalization 08/30 to 09/03. Pt was sent home with home health services by [facility].     [facility]

## 2021-09-10 NOTE — PROGRESS NOTES
[facility]    Patient Name:  Miya Lam   YOB: 1965   Gender:  Female   Appt Date:  9/8/2021   Provider:  Bhavani Bloom MD   Insurance:  [facility]     PATIENT PROVIDERS  Referring Provid Outpatient Medications:   •  traMADol 50 MG Oral Tab, Take 1 tablet (50 mg total) by mouth every 6 (six) hours as needed. , Disp: 20 tablet, Rfl: 0  •  acetaminophen 500 MG Oral Tab, Take 2 tablets (1,000 mg total) by mouth every 6 (six) hours as needed. , D flexure      Reviewed Social History:  Social History    Socioeconomic History      Marital status: Single      Spouse name: Not on file      Number of children: Not on file      Years of education: Not on file      Highest education level: Not on file Pupils: Pupils are equal, round, and reactive to light. Cardiovascular:      Rate and Rhythm: Normal rate and regular rhythm. Heart sounds: No murmur heard. Pulmonary:      Effort: Pulmonary effort is normal. No respiratory distress.       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  S/p distal pancreatectomy, en bloc partial gastrectomy, splenectomy 8/30/2021.  Path consistent with mucinous c

## 2021-09-14 ENCOUNTER — MED REC SCAN ONLY (OUTPATIENT)
Dept: INTERNAL MEDICINE CLINIC | Facility: CLINIC | Age: 56
End: 2021-09-14

## 2021-09-15 ENCOUNTER — TELEPHONE (OUTPATIENT)
Dept: PHYSICAL MEDICINE AND REHAB | Facility: CLINIC | Age: 56
End: 2021-09-15

## 2021-09-15 ENCOUNTER — OFFICE VISIT (OUTPATIENT)
Dept: PHYSICAL MEDICINE AND REHAB | Facility: CLINIC | Age: 56
End: 2021-09-15
Payer: COMMERCIAL

## 2021-09-15 VITALS
HEIGHT: 63 IN | DIASTOLIC BLOOD PRESSURE: 75 MMHG | SYSTOLIC BLOOD PRESSURE: 105 MMHG | HEART RATE: 97 BPM | WEIGHT: 145 LBS | BODY MASS INDEX: 25.69 KG/M2

## 2021-09-15 DIAGNOSIS — R29.898 WEAKNESS OF BOTH LOWER EXTREMITIES: ICD-10-CM

## 2021-09-15 DIAGNOSIS — K86.89 PANCREATIC MASS: ICD-10-CM

## 2021-09-15 DIAGNOSIS — M43.17 SPONDYLOLISTHESIS AT L5-S1 LEVEL: Primary | ICD-10-CM

## 2021-09-15 DIAGNOSIS — G62.81 CRITICAL ILLNESS POLYNEUROPATHY (HCC): ICD-10-CM

## 2021-09-15 PROCEDURE — 3078F DIAST BP <80 MM HG: CPT | Performed by: PHYSICAL MEDICINE & REHABILITATION

## 2021-09-15 PROCEDURE — 99214 OFFICE O/P EST MOD 30 MIN: CPT | Performed by: PHYSICAL MEDICINE & REHABILITATION

## 2021-09-15 PROCEDURE — 3074F SYST BP LT 130 MM HG: CPT | Performed by: PHYSICAL MEDICINE & REHABILITATION

## 2021-09-15 PROCEDURE — 3008F BODY MASS INDEX DOCD: CPT | Performed by: PHYSICAL MEDICINE & REHABILITATION

## 2021-09-15 RX ORDER — GABAPENTIN 300 MG/1
300 CAPSULE ORAL 3 TIMES DAILY
Qty: 90 CAPSULE | Refills: 3 | Status: SHIPPED | OUTPATIENT
Start: 2021-09-15 | End: 2022-01-26

## 2021-09-15 NOTE — PROGRESS NOTES
[address]  FOLLOW UP EVALUATION      Chief Complaint: back pain. HISTORY OF PRESENT ILLNESS:   Patient presents with: Foot Pain: LOV 04/22/21 f/u for bilateral feet/ankle pain/numbness/tingling.  Has bee well.    H&P:  The patient is a 50year old female with significant past medical history of Covid infection who presents with bilateral low back pain with radiation of the left lower extremity.   Patient endorses a severe infection with Covid where she was Surgical History:   Procedure Laterality Date   • PANCREATECTOMY,DISTAL+PRESERV DUOD  08/30/2021    Robotic assisted converted to open distal pancreatectomy, en bloc partial gastrectomy and splenectomy;  Takedown of splenic flexure         CURRENT MEDICATIO use: Not Currently    Drug use: Never         REVIEW OF SYSTEMS:   Patient-reported ROS  Constitutional  Sleep Disturbance: denies   Cardiovascular  Chest Pain: denies  Irregular Heartbeat: denies   Gastrointestinal  Bowel Incontinence: denies  Heartburn: Loading: no specific facet pain  Slump test: positive for pain symptoms for radicular pain symptoms      LABS:     Lab Results   Component Value Date     (H) 08/17/2021    A1C 6.2 (H) 08/17/2021     Lab Results   Component Value Date    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   I have recommended she obtain MRI imaging of the lumbar spine as she does have nighttime pain in and symptoms in

## 2021-09-16 ENCOUNTER — TELEPHONE (OUTPATIENT)
Dept: HEMATOLOGY/ONCOLOGY | Facility: HOSPITAL | Age: 56
End: 2021-09-16

## 2021-09-16 ENCOUNTER — TELEPHONE (OUTPATIENT)
Dept: SURGERY | Facility: CLINIC | Age: 56
End: 2021-09-16

## 2021-09-16 NOTE — TELEPHONE ENCOUNTER
ERA informed by Surg Onc RN that the pt has requested an update on her paperwork. SW had previously left the pt a message on 09/09 to discuss her paperwork. ERA placed another call today 09/16 to the pt. Second message left, awaiting return call.  Update g

## 2021-09-16 NOTE — TELEPHONE ENCOUNTER
Pt called, inquiring about status of disability paperwork. Message sent to [facility] via secure chat. Pt provided our fax number and Kacy's contact information as well. I will f/u on this.

## 2021-09-22 ENCOUNTER — OFFICE VISIT (OUTPATIENT)
Dept: SURGERY | Facility: CLINIC | Age: 56
End: 2021-09-22
Payer: COMMERCIAL

## 2021-09-22 VITALS
RESPIRATION RATE: 16 BRPM | HEIGHT: 63 IN | BODY MASS INDEX: 25.05 KG/M2 | HEART RATE: 102 BPM | DIASTOLIC BLOOD PRESSURE: 90 MMHG | SYSTOLIC BLOOD PRESSURE: 136 MMHG | WEIGHT: 141.38 LBS | OXYGEN SATURATION: 95 %

## 2021-09-22 DIAGNOSIS — K86.89 PANCREATIC MASS: Primary | ICD-10-CM

## 2021-09-22 DIAGNOSIS — D49.0 MUCINOUS NEOPLASM OF PANCREAS: ICD-10-CM

## 2021-09-22 PROCEDURE — 99024 POSTOP FOLLOW-UP VISIT: CPT | Performed by: SURGERY

## 2021-09-22 PROCEDURE — 3008F BODY MASS INDEX DOCD: CPT | Performed by: SURGERY

## 2021-09-22 PROCEDURE — 3080F DIAST BP >= 90 MM HG: CPT | Performed by: SURGERY

## 2021-09-22 PROCEDURE — 3075F SYST BP GE 130 - 139MM HG: CPT | Performed by: SURGERY

## 2021-09-22 NOTE — PROGRESS NOTES
[facility]    Patient Name:  Amanda Vela   YOB: 1965   Gender:  Female   Appt Date:  9/22/2021   Provider:  Leslie Hernandez MD   Insurance:  [facility]     PATIENT PROVIDERS  Referring Franklin Medications Reviewed:    Current Outpatient Medications:   •  gabapentin 300 MG Oral Cap, Take 1 capsule (300 mg total) by mouth 3 (three) times daily. , Disp: 90 capsule, Rfl: 3  •  traMADol 50 MG Oral Tab, Take 1 tablet (50 mg total) by mouth every 6 Substance and Sexual Activity      Alcohol use: Not Currently      Drug use: Never    Other Topics      Concerns:        Caffeine Concern: Yes          3 cups weekly        Exercise: No     Reviewed:  Family History   Problem Relation Age of Onset   • Diab Abdomen is soft. Tenderness: There is no abdominal tenderness. Musculoskeletal:      Cervical back: Normal range of motion. Skin:     General: Skin is warm and dry.    Neurological:      Mental Status: She is alert and oriented to person, place, an negative. Final stage pTis, N0    Patient is doing well from a post operative standpoint  No signs/symptoms of pancreatic insufficiency  Will repeat MRI/MRCP in one year per AGA guidelines,. Follow up 6 months.   Discussed potential side effects post pancr

## 2021-09-24 ENCOUNTER — OFFICE VISIT (OUTPATIENT)
Dept: INTERNAL MEDICINE CLINIC | Facility: CLINIC | Age: 56
End: 2021-09-24
Payer: COMMERCIAL

## 2021-09-24 VITALS
BODY MASS INDEX: 25.16 KG/M2 | WEIGHT: 142 LBS | DIASTOLIC BLOOD PRESSURE: 72 MMHG | SYSTOLIC BLOOD PRESSURE: 105 MMHG | HEIGHT: 63 IN | HEART RATE: 81 BPM

## 2021-09-24 DIAGNOSIS — D49.0 MUCINOUS NEOPLASM OF PANCREAS: ICD-10-CM

## 2021-09-24 DIAGNOSIS — E11.9 TYPE 2 DIABETES MELLITUS WITHOUT COMPLICATION, WITHOUT LONG-TERM CURRENT USE OF INSULIN (HCC): Primary | ICD-10-CM

## 2021-09-24 PROCEDURE — 3078F DIAST BP <80 MM HG: CPT | Performed by: INTERNAL MEDICINE

## 2021-09-24 PROCEDURE — 99214 OFFICE O/P EST MOD 30 MIN: CPT | Performed by: INTERNAL MEDICINE

## 2021-09-24 PROCEDURE — 3008F BODY MASS INDEX DOCD: CPT | Performed by: INTERNAL MEDICINE

## 2021-09-24 PROCEDURE — 3074F SYST BP LT 130 MM HG: CPT | Performed by: INTERNAL MEDICINE

## 2021-09-24 NOTE — PROGRESS NOTES
Subjective:   Patient ID: Tena Lee is a 50year old female. Patient presents with:  Post of  Exam: s/p surgery sp open  pancreatectomy, splenectomy with Dr. Bang Pineda on 9/3 /21 at [address] [place] [address].    Patient states doing well  After surgery patient d mouth every morning. 30 tablet 0   • gabapentin 300 MG Oral Cap Take 1 capsule (300 mg total) by mouth 3 (three) times daily. 90 capsule 3   • acetaminophen 500 MG Oral Tab Take 2 tablets (1,000 mg total) by mouth every 6 (six) hours as needed.  60 tablet 2 breath sounds. No wheezing or rales. Abdominal:      Palpations: Abdomen is soft. There is no mass. Tenderness: There is no abdominal tenderness. There is no right CVA tenderness or left CVA tenderness.           Comments: Surgical wound healed well #2 the age implants that type B vaccine -HIb    #3 further Valent pneumococcal conjugate AC [place] vaccine series M AN  [address] (Menactra )or (Monveo-)given today      4 monovalent meningococcal 0 group 3 vaccine series men B 43( Bexsero )or men BFH BP-(Trumenb

## 2021-09-27 DIAGNOSIS — G62.81 CRITICAL ILLNESS POLYNEUROPATHY (HCC): ICD-10-CM

## 2021-09-27 RX ORDER — GABAPENTIN 300 MG/1
300 CAPSULE ORAL 3 TIMES DAILY
Qty: 90 CAPSULE | Refills: 3 | Status: CANCELLED | OUTPATIENT
Start: 2021-09-27

## 2021-09-28 NOTE — TELEPHONE ENCOUNTER
Gabapentin 300 mg refilled on 9/15/21 for 30 day supply with 3 refills.    LOV: 9/15/21  NOV: 10/8/21

## 2021-09-30 DIAGNOSIS — K21.9 GASTROESOPHAGEAL REFLUX DISEASE WITHOUT ESOPHAGITIS: ICD-10-CM

## 2021-10-05 RX ORDER — OMEPRAZOLE 20 MG/1
CAPSULE, DELAYED RELEASE ORAL
Qty: 90 CAPSULE | Refills: 0 | Status: SHIPPED | OUTPATIENT
Start: 2021-10-05

## 2021-10-06 ENCOUNTER — HOSPITAL ENCOUNTER (OUTPATIENT)
Dept: MRI IMAGING | Facility: HOSPITAL | Age: 56
Discharge: HOME | End: 2021-10-06
Attending: PHYSICAL MEDICINE & REHABILITATION
Payer: COMMERCIAL

## 2021-10-06 ENCOUNTER — MED REC SCAN ONLY (OUTPATIENT)
Dept: INTERNAL MEDICINE CLINIC | Facility: CLINIC | Age: 56
End: 2021-10-06

## 2021-10-06 DIAGNOSIS — G62.81 CRITICAL ILLNESS POLYNEUROPATHY (HCC): ICD-10-CM

## 2021-10-06 DIAGNOSIS — M43.17 SPONDYLOLISTHESIS AT L5-S1 LEVEL: ICD-10-CM

## 2021-10-06 PROCEDURE — 72148 MRI LUMBAR SPINE W/O DYE: CPT | Performed by: PHYSICAL MEDICINE & REHABILITATION

## 2021-10-08 ENCOUNTER — TELEPHONE (OUTPATIENT)
Dept: NEUROLOGY | Facility: CLINIC | Age: 56
End: 2021-10-08

## 2021-10-08 ENCOUNTER — TELEMEDICINE (OUTPATIENT)
Dept: PHYSICAL MEDICINE AND REHAB | Facility: CLINIC | Age: 56
End: 2021-10-08

## 2021-10-08 ENCOUNTER — OFFICE VISIT (OUTPATIENT)
Dept: PULMONOLOGY | Facility: CLINIC | Age: 56
End: 2021-10-08
Payer: COMMERCIAL

## 2021-10-08 VITALS
OXYGEN SATURATION: 96 % | BODY MASS INDEX: 24.55 KG/M2 | RESPIRATION RATE: 18 BRPM | HEIGHT: 64 IN | DIASTOLIC BLOOD PRESSURE: 85 MMHG | WEIGHT: 143.81 LBS | TEMPERATURE: 98 F | SYSTOLIC BLOOD PRESSURE: 126 MMHG | HEART RATE: 74 BPM

## 2021-10-08 DIAGNOSIS — K86.89 PANCREATIC MASS: ICD-10-CM

## 2021-10-08 DIAGNOSIS — R06.00 DYSPNEA, UNSPECIFIED TYPE: Primary | ICD-10-CM

## 2021-10-08 DIAGNOSIS — G62.81 CRITICAL ILLNESS POLYNEUROPATHY (HCC): ICD-10-CM

## 2021-10-08 DIAGNOSIS — M43.17 SPONDYLOLISTHESIS AT L5-S1 LEVEL: Primary | ICD-10-CM

## 2021-10-08 DIAGNOSIS — R29.898 WEAKNESS OF BOTH LOWER EXTREMITIES: ICD-10-CM

## 2021-10-08 PROCEDURE — 3074F SYST BP LT 130 MM HG: CPT | Performed by: INTERNAL MEDICINE

## 2021-10-08 PROCEDURE — 3008F BODY MASS INDEX DOCD: CPT | Performed by: INTERNAL MEDICINE

## 2021-10-08 PROCEDURE — 3079F DIAST BP 80-89 MM HG: CPT | Performed by: INTERNAL MEDICINE

## 2021-10-08 PROCEDURE — 99213 OFFICE O/P EST LOW 20 MIN: CPT | Performed by: INTERNAL MEDICINE

## 2021-10-08 PROCEDURE — 99214 OFFICE O/P EST MOD 30 MIN: CPT | Performed by: PHYSICAL MEDICINE & REHABILITATION

## 2021-10-08 NOTE — TELEPHONE ENCOUNTER
[employer] for authorization of approval for Caudal Epidural Steroid Injection under fluorosocopy cpt code 88009. Authorization # [id number] effective 10/11/2021 - 11/09/2021.  Will inform Nursing
Patient has been scheduled for Caudal Epidural Steroid Injection under fluorosocopy on 10/11/21 at the [facility] with Dr. Teresa Taylor.   -Anesthesia type: Local.  -If receiving MAC or IVC sedation patient will need to get COVID tested 3 days prior even if already vac
no...

## 2021-10-08 NOTE — PROGRESS NOTES
Referring Physician  Carson Medina MD    History of Present Illness  Patient presents today for follow-up visit to pulmonary clinic. Underwent pancreatectomy and splenectomy since last office visit. Denies worsening dyspnea symptoms.   Denies use of with exertion  3. ILD    Plan  -Patient presents today for pulmonary follow-up evaluation. Prolonged hospitalization secondary to severe COVID-19 in October 2020 requiring dilatory support followed by tracheostomy and eventual decannulation.   Dyspnea gra

## 2021-10-08 NOTE — PROGRESS NOTES
[address]    Telemedicine Visit - Follow Up Evaluation    Telehealth Verbal Consent   I conducted a telehealth visit with Bubba Cullen today, 10/08/21, which was completed using two-way, real-time and activity and ambulation. Pain is moderate in nature and its affecting her day-to-day life. She is taking gabapentin 300 mg 3 times daily as tolerated. She denies any change in strength sensation or bowel bladder.       PAST MEDICAL HISTORY:     Past Grandmother    • Heart Disorder Maternal Grandfather    • Hypertension Paternal Grandmother    • Heart Disorder Paternal Grandmother    • Diabetes Paternal Grandfather           SOCIAL HISTORY:   Social History    Tobacco Use      Smoking status: Never Smo PTP 15.4 (H) 08/30/2021    INR 1.24 (H) 08/30/2021     Lab Results   Component Value Date    VITD 33.5 03/12/2021       IMAGING:   MRI LUMBAR SPINE dated October 6, 2021 revealed:   Personally reviewed MRI imaging which is notable for bilateral L5 pars def staff will be reaching out to the patient for the elective procedure when it is considered appropriate and the patient can follow-up in office as well when appropriate. In the meantime, I will be available for telephone and video encounter.       The joesph

## 2021-10-11 ENCOUNTER — OFFICE VISIT (OUTPATIENT)
Dept: SURGERY | Facility: CLINIC | Age: 56
End: 2021-10-11

## 2021-10-11 ENCOUNTER — TELEPHONE (OUTPATIENT)
Dept: PHYSICAL MEDICINE AND REHAB | Facility: CLINIC | Age: 56
End: 2021-10-11

## 2021-10-11 DIAGNOSIS — M54.16 BILATERAL LUMBAR RADICULOPATHY: Primary | ICD-10-CM

## 2021-10-11 PROCEDURE — 62323 NJX INTERLAMINAR LMBR/SAC: CPT | Performed by: PHYSICAL MEDICINE & REHABILITATION

## 2021-10-18 NOTE — PHYSICAL THERAPY NOTE
PHYSICAL THERAPY EVALUATION - INPATIENT     Room Number: 224/224-A  Evaluation Date: 8/31/2021  Type of Evaluation: Initial   Physician Order: PT Eval and Treat    Presenting Problem: s/p robotic assisted converted to open distal pancreatectomy, splenecto training;Coordination; Body mechanics; Endurance; Patient education; Family education;Stair training;Balance training;Strengthening;Energy conservation  Rehab Potential : Good  Frequency (Obs): Daily       PHYSICAL THERAPY MEDICAL/SOCIAL HISTORY     History re -  Dynamic Standing: Fair -    ADDITIONAL TESTS                                    NEUROLOGICAL FINDINGS                      ACTIVITY TOLERANCE                         O2 WALK       AM-PAC '6-Clicks' INPATIENT SHORT FORM - BASIC MOBILITY  How much difficu assistance level: modified independent   Goal #3   Current Status    Goal #4 Patient to demonstrate independence with home activity/exercise instructions provided to patient in preparation for discharge.    Goal #4   Current Status    Goal #5    Goal #5   C Graft Donor Site Dermal Sutures (Optional): 4-0 Vicryl

## 2021-10-20 ENCOUNTER — LAB ENCOUNTER (OUTPATIENT)
Dept: LAB | Age: 56
End: 2021-10-20
Attending: INTERNAL MEDICINE
Payer: COMMERCIAL

## 2021-10-20 DIAGNOSIS — E11.9 TYPE 2 DIABETES MELLITUS WITHOUT COMPLICATION, WITHOUT LONG-TERM CURRENT USE OF INSULIN (HCC): ICD-10-CM

## 2021-10-20 PROCEDURE — 80053 COMPREHEN METABOLIC PANEL: CPT

## 2021-10-20 PROCEDURE — 3044F HG A1C LEVEL LT 7.0%: CPT | Performed by: INTERNAL MEDICINE

## 2021-10-20 PROCEDURE — 84443 ASSAY THYROID STIM HORMONE: CPT

## 2021-10-20 PROCEDURE — 85025 COMPLETE CBC W/AUTO DIFF WBC: CPT

## 2021-10-20 PROCEDURE — 80061 LIPID PANEL: CPT

## 2021-10-20 PROCEDURE — 36415 COLL VENOUS BLD VENIPUNCTURE: CPT

## 2021-10-20 PROCEDURE — 83036 HEMOGLOBIN GLYCOSYLATED A1C: CPT

## 2021-10-21 ENCOUNTER — OFFICE VISIT (OUTPATIENT)
Dept: INTERNAL MEDICINE CLINIC | Facility: CLINIC | Age: 56
End: 2021-10-21
Payer: COMMERCIAL

## 2021-10-21 VITALS
HEIGHT: 63 IN | BODY MASS INDEX: 25.87 KG/M2 | HEART RATE: 79 BPM | DIASTOLIC BLOOD PRESSURE: 82 MMHG | SYSTOLIC BLOOD PRESSURE: 116 MMHG | WEIGHT: 146 LBS

## 2021-10-21 DIAGNOSIS — Z23 NEED FOR VACCINATION: ICD-10-CM

## 2021-10-21 DIAGNOSIS — E78.00 PURE HYPERCHOLESTEROLEMIA: ICD-10-CM

## 2021-10-21 DIAGNOSIS — D49.0 MUCINOUS NEOPLASM OF PANCREAS: ICD-10-CM

## 2021-10-21 DIAGNOSIS — I10 ESSENTIAL HYPERTENSION WITH GOAL BLOOD PRESSURE LESS THAN 130/80: ICD-10-CM

## 2021-10-21 DIAGNOSIS — E11.9 TYPE 2 DIABETES MELLITUS WITHOUT COMPLICATION, WITHOUT LONG-TERM CURRENT USE OF INSULIN (HCC): Primary | ICD-10-CM

## 2021-10-21 PROCEDURE — 3008F BODY MASS INDEX DOCD: CPT | Performed by: INTERNAL MEDICINE

## 2021-10-21 PROCEDURE — 3079F DIAST BP 80-89 MM HG: CPT | Performed by: INTERNAL MEDICINE

## 2021-10-21 PROCEDURE — 90732 PPSV23 VACC 2 YRS+ SUBQ/IM: CPT | Performed by: INTERNAL MEDICINE

## 2021-10-21 PROCEDURE — 90471 IMMUNIZATION ADMIN: CPT | Performed by: INTERNAL MEDICINE

## 2021-10-21 PROCEDURE — 90686 IIV4 VACC NO PRSV 0.5 ML IM: CPT | Performed by: INTERNAL MEDICINE

## 2021-10-21 PROCEDURE — 3074F SYST BP LT 130 MM HG: CPT | Performed by: INTERNAL MEDICINE

## 2021-10-21 PROCEDURE — 90472 IMMUNIZATION ADMIN EACH ADD: CPT | Performed by: INTERNAL MEDICINE

## 2021-10-21 PROCEDURE — 99214 OFFICE O/P EST MOD 30 MIN: CPT | Performed by: INTERNAL MEDICINE

## 2021-10-21 RX ORDER — ROSUVASTATIN CALCIUM 5 MG/1
5 TABLET, COATED ORAL NIGHTLY
Qty: 90 TABLET | Refills: 0 | Status: SHIPPED | OUTPATIENT
Start: 2021-10-21 | End: 2022-01-18

## 2021-10-21 NOTE — PROGRESS NOTES
Subjective:   Patient ID: Luis Rowland is a 49year old female. Patient presents with:   Follow - Up: 3 weeks f/u of Surgery      Patient presents today for follow-up second visit after surgery sp open  pancreatectomy, splenectomy with Dr. Derick Jha by mouth 3 (three) times daily. 90 capsule 3   • traMADol 50 MG Oral Tab Take 1 tablet (50 mg total) by mouth every 6 (six) hours as needed.  20 tablet 0   • acetaminophen 500 MG Oral Tab Take 2 tablets (1,000 mg total) by mouth every 6 (six) hours as neede no erythema or tenderness       Musculoskeletal:      Cervical back: Neck supple. Right lower leg: No edema. Left lower leg: No edema. Lymphadenopathy:      Cervical: No cervical adenopathy. Skin:     General: Skin is warm and dry.    Neurolog pneumococcal conjugate AC W[place] vaccine series M AN  [address] (Menactra )or (Monveo-)given today      4 monovalent meningococcal 0 group 3 vaccine series men B 43( Bexsero )or men BFH BP-(Trumenba)  In AM        History of tachycardia while in the hospital patien

## 2021-10-22 ENCOUNTER — TELEPHONE (OUTPATIENT)
Dept: PHYSICAL MEDICINE AND REHAB | Facility: CLINIC | Age: 56
End: 2021-10-22

## 2021-10-22 ENCOUNTER — TELEMEDICINE (OUTPATIENT)
Dept: PHYSICAL MEDICINE AND REHAB | Facility: CLINIC | Age: 56
End: 2021-10-22

## 2021-10-22 DIAGNOSIS — R29.898 WEAKNESS OF BOTH LOWER EXTREMITIES: ICD-10-CM

## 2021-10-22 DIAGNOSIS — M43.17 SPONDYLOLISTHESIS AT L5-S1 LEVEL: ICD-10-CM

## 2021-10-22 DIAGNOSIS — G62.81 CRITICAL ILLNESS POLYNEUROPATHY (HCC): ICD-10-CM

## 2021-10-22 DIAGNOSIS — M54.16 BILATERAL LUMBAR RADICULOPATHY: Primary | ICD-10-CM

## 2021-10-22 PROCEDURE — 99214 OFFICE O/P EST MOD 30 MIN: CPT | Performed by: PHYSICAL MEDICINE & REHABILITATION

## 2021-10-22 NOTE — PROGRESS NOTES
[address]    Telemedicine Visit - Follow Up Evaluation    Telehealth Verbal Consent   I conducted a telehealth visit with Guerrero Seth today, 10/22/21, which was completed using two-way, real-time through the night without having any pain affecting her ability to stay asleep. Additionally, during the day, she has noted that she can skip her afternoon dosage of gabapentin medicine without having any worsening symptoms.  Furthermore, she has noted impr total) by mouth every 6 (six) hours as needed. 60 tablet 2   • metFORMIN 500 MG Oral Tab Take 1.5 tablets (750 mg total) by mouth 2 (two) times daily with meals.  270 tablet 0   • Microlet Lancets Does not apply Misc Check sugars 2 times  Daily and as neede 31.5 10/20/2021    RDW 14.2 10/20/2021    .0 10/20/2021     Lab Results   Component Value Date     (H) 10/20/2021    BUN 23 (H) 10/20/2021    BUNCREA 29.9 (H) 10/20/2021    CREATSERUM 0.77 10/20/2021    ANIONGAP 6 10/20/2021    GFRNAA 88 10/2 continue gabapentin 300 mg twice daily and I recommend that she can discontinue the afternoon dosage. She will also start a dedicated PT program for her lumbar spine and we will follow-up 4 weeks after this to monitor her progress.     Follow-up:  For injec

## 2021-10-22 NOTE — TELEPHONE ENCOUNTER
Initiated authorization for Bilateral L5 TFESIs CPT [id number] dx:M54.16 to be done at [facility] with AIM online  Status: Tentative Denial    Requires Further Review  Based on the information you have provided, your request does not meet medical necessity criteri

## 2021-10-28 NOTE — TELEPHONE ENCOUNTER
Received notice of Approval from [employer] for Bilateral L5 TFESIs with authorization #[id number] valid 10/25/21-11/23/21

## 2021-10-29 NOTE — TELEPHONE ENCOUNTER
Patient has been scheduled for Bilateral L5 transforaminal epidural steroid injections   on 11/1/21 at the [facility] with Dr. Mike Rabago.   -Anesthesia type: Local .  -If receiving MAC or IVC sedation patient will need to get COVID tested 3 days prior even if alread

## 2021-11-01 ENCOUNTER — OFFICE VISIT (OUTPATIENT)
Dept: SURGERY | Facility: CLINIC | Age: 56
End: 2021-11-01

## 2021-11-01 DIAGNOSIS — M54.16 BILATERAL LUMBAR RADICULOPATHY: Primary | ICD-10-CM

## 2021-11-01 PROCEDURE — 64483 NJX AA&/STRD TFRM EPI L/S 1: CPT | Performed by: PHYSICAL MEDICINE & REHABILITATION

## 2021-11-01 NOTE — PROCEDURES
[address]    BILATERAL LUMBAR TRANSFORAMINAL   NAME:  Amanda Vela    MR #:    [id number] :  10/18/1965     PHYSICIAN:  Aviva Alba.  Danielle Urbina DO        Operative Report    DATE OF PROCEDURE: 2021   PREOPERATIVE DIAGNOSES lidocaine without epinephrine. Then, a 3.5 inch, 22-gauge spinal needle was inserted and directed towards the left L5-S1 intervertebral foramen.   When it felt to be in good position, AP fluoroscopy was used to advance the needle to the 6 o'clock position

## 2021-11-19 ENCOUNTER — TELEMEDICINE (OUTPATIENT)
Dept: PHYSICAL MEDICINE AND REHAB | Facility: CLINIC | Age: 56
End: 2021-11-19
Payer: COMMERCIAL

## 2021-11-19 DIAGNOSIS — G62.81 CRITICAL ILLNESS POLYNEUROPATHY (HCC): ICD-10-CM

## 2021-11-19 DIAGNOSIS — R29.898 WEAKNESS OF BOTH LOWER EXTREMITIES: ICD-10-CM

## 2021-11-19 DIAGNOSIS — M43.17 SPONDYLOLISTHESIS AT L5-S1 LEVEL: ICD-10-CM

## 2021-11-19 DIAGNOSIS — K86.89 PANCREATIC MASS: ICD-10-CM

## 2021-11-19 DIAGNOSIS — M54.16 BILATERAL LUMBAR RADICULOPATHY: Primary | ICD-10-CM

## 2021-11-19 PROCEDURE — 99213 OFFICE O/P EST LOW 20 MIN: CPT | Performed by: PHYSICAL MEDICINE & REHABILITATION

## 2021-11-19 NOTE — PROGRESS NOTES
[address]    Telemedicine Visit - Follow Up Evaluation    Telehealth Verbal Consent   I conducted a telehealth visit with Genia Lorenzo today, 11/19/21, which was completed using two-way, real-time severe. She has noted improvement in her function as well. She is able to exercise more frequently and able to wear different types of shoes which she was not able to tolerate previously.   She even can take less medication including gabapentin and not ha as needed 200 strip 1   • Blood Glucose Monitoring Suppl (CONTOUR NEXT MONITOR) w/Device Does not apply Kit             ALLERGIES:   No Known Allergies      FAMILY HISTORY:     Family History   Problem Relation Age of Onset   • Diabetes Father    • Cancer ALKPHO 68 10/20/2021    AST 8 (L) 10/20/2021    ALT 13 10/20/2021    BILT 0.6 10/20/2021    TP 7.9 10/20/2021    ALB 3.9 10/20/2021    GLOBULIN 4.0 10/20/2021     10/20/2021    K 4.6 10/20/2021     10/20/2021    CO2 28.0 10/20/2021     Lab Resu informed that corticosteroids, in any form, may significantly decrease immune response and may increase risk and complications of infection.     The patient was advised that given the current situation with COVID-19, it is in his/her best interest to social

## 2021-12-03 ENCOUNTER — IMMUNIZATION (OUTPATIENT)
Dept: LAB | Facility: HOSPITAL | Age: 56
End: 2021-12-03
Attending: EMERGENCY MEDICINE
Payer: COMMERCIAL

## 2021-12-03 DIAGNOSIS — Z23 NEED FOR VACCINATION: Primary | ICD-10-CM

## 2021-12-03 PROCEDURE — 0064A SARSCOV2 VAC 50MCG/0.25ML IM: CPT

## 2021-12-21 NOTE — TELEPHONE ENCOUNTER
Refill passed per [facility] protocol. Requested Prescriptions   Pending Prescriptions Disp Refills    METOPROLOL TARTRATE 25 MG Oral Tab [Pharmacy Med Name: METOPROLOL TARTRATE 25MG TABLETS] 30 tablet 0     Sig: TAKE 1/2 TABLET BY MOUTH TWICE DAILY FOR ELEVATED HR AND BLOOD PRESSURE        Hypertensive Medications Protocol Passed - 12/21/2021 11:39 AM        Passed - CMP or BMP in past 12 months        Passed - Appointment in past 6 or next 3 months        Passed - GFR Non- > 50     Lab Results   Component Value Date    GFRNAA 88 10/20/2021                      Recent Outpatient Visits              1 month ago Bilateral lumbar radiculopathy    [facility] Talita Lopez DO    Telemedicine    1 month ago Bilateral lumbar radiculopathy    [facility] Talita Lopez DO    Office Visit    2 months ago Bilateral lumbar radiculopathy    [facility] Bonnee Brittle Y,     Telemedicine    2 months ago Type 2 diabetes mellitus without complication, without long-term current use of insulin [facility])    Janis Carrel, MD    Office Visit    2 months ago Bilateral lumbar radiculopathy    [facility] Talita Lopez DO    Office Visit           Future Appointments         Provider Department Appt Notes    In 4 weeks Chance Bruner.      In 1 month Emanuel Nance MD [facility], [place] 3m f/u    In 2 months Leana Rooney MD [facility] 6 mth f/u

## 2022-01-02 ENCOUNTER — HOSPITAL ENCOUNTER (EMERGENCY)
Age: 57
Discharge: HOME | End: 2022-01-02
Attending: EMERGENCY MEDICINE

## 2022-01-02 VITALS
DIASTOLIC BLOOD PRESSURE: 105 MMHG | HEART RATE: 80 BPM | OXYGEN SATURATION: 98 % | TEMPERATURE: 98.1 F | HEIGHT: 63 IN | BODY MASS INDEX: 25.69 KG/M2 | SYSTOLIC BLOOD PRESSURE: 168 MMHG | WEIGHT: 145 LBS | RESPIRATION RATE: 16 BRPM

## 2022-01-02 DIAGNOSIS — B02.9 HERPES ZOSTER WITHOUT COMPLICATION: Primary | ICD-10-CM

## 2022-01-02 PROCEDURE — 99281 EMR DPT VST MAYX REQ PHY/QHP: CPT

## 2022-01-02 RX ORDER — ACYCLOVIR 400 MG/1
800 TABLET ORAL 3 TIMES DAILY
Qty: 42 TABLET | Refills: 0 | Status: SHIPPED | OUTPATIENT
Start: 2022-01-02 | End: 2022-01-09

## 2022-01-02 RX ORDER — ROSUVASTATIN CALCIUM 5 MG/1
5 TABLET, COATED ORAL
COMMUNITY
Start: 2021-10-21

## 2022-01-02 RX ORDER — OMEPRAZOLE 20 MG/1
CAPSULE, DELAYED RELEASE ORAL
COMMUNITY
Start: 2021-10-05

## 2022-01-02 ASSESSMENT — PAIN DESCRIPTION - PAIN TYPE: TYPE: ACUTE PAIN

## 2022-01-02 ASSESSMENT — PAIN SCALES - GENERAL: PAINLEVEL_OUTOF10: 2

## 2022-01-10 ENCOUNTER — MED REC SCAN ONLY (OUTPATIENT)
Dept: INTERNAL MEDICINE CLINIC | Facility: CLINIC | Age: 57
End: 2022-01-10

## 2022-01-11 ENCOUNTER — OFFICE VISIT (OUTPATIENT)
Dept: INTERNAL MEDICINE CLINIC | Facility: CLINIC | Age: 57
End: 2022-01-11
Payer: COMMERCIAL

## 2022-01-11 VITALS
SYSTOLIC BLOOD PRESSURE: 125 MMHG | HEART RATE: 67 BPM | HEIGHT: 63 IN | WEIGHT: 159 LBS | BODY MASS INDEX: 28.17 KG/M2 | DIASTOLIC BLOOD PRESSURE: 76 MMHG

## 2022-01-11 DIAGNOSIS — B02.9 HERPES ZOSTER WITHOUT COMPLICATION: Primary | ICD-10-CM

## 2022-01-11 DIAGNOSIS — D49.0 MUCINOUS NEOPLASM OF PANCREAS: ICD-10-CM

## 2022-01-11 DIAGNOSIS — E78.00 PURE HYPERCHOLESTEROLEMIA: ICD-10-CM

## 2022-01-11 PROCEDURE — 3074F SYST BP LT 130 MM HG: CPT | Performed by: INTERNAL MEDICINE

## 2022-01-11 PROCEDURE — 3008F BODY MASS INDEX DOCD: CPT | Performed by: INTERNAL MEDICINE

## 2022-01-11 PROCEDURE — 99214 OFFICE O/P EST MOD 30 MIN: CPT | Performed by: INTERNAL MEDICINE

## 2022-01-11 PROCEDURE — 3078F DIAST BP <80 MM HG: CPT | Performed by: INTERNAL MEDICINE

## 2022-01-11 NOTE — PROGRESS NOTES
Subjective:   Patient ID: Bakari Brooke is a 49year old female. Patient presents with:  ER F/U: Seen at [address] [place] [address] on 1/2/22 due to shingles. Stts that it was located on the right side of the face.           Patient in office today for imke (two) times daily with meals.  270 tablet 0   • Microlet Lancets Does not apply Misc Check sugars 2 times  Daily and as needed 200 each 1   • CONTOUR NEXT TEST In Vitro Strip Check sugars  Twice daily and as needed 200 strip 1   • Blood Glucose Monitoring S Forehead area     Neurological:      Mental Status: She is alert and oriented to person, place, and time. Psychiatric:         Behavior: Behavior normal.     Blood pressure 125/76, pulse 67, height 5' 3\" (1.6 m), weight 159 lb (72.1 kg).         Assessme management    DM2  Keep sugars glycemic control to prevent complications of DM2  Keep 1800 gini ADA- Diabetic diet   diet/exercise   accu-checks   Eye exam and foot exam yearly   Metformin 750 mg twice daily A1c   10/21 - A1c is 6.2-  We will closely [facility]

## 2022-01-18 NOTE — TELEPHONE ENCOUNTER
Please review; protocol failed/no protocol.      Requested Prescriptions   Pending Prescriptions Disp Refills    ROSUVASTATIN 5 MG Oral Tab [Pharmacy Med Name: ROSUVASTATIN 5MG TABLETS] 90 tablet 0     Sig: TAKE 1 TABLET(5 MG) BY MOUTH EVERY NIGHT        Ch

## 2022-01-19 ENCOUNTER — OFFICE VISIT (OUTPATIENT)
Dept: PHYSICAL MEDICINE AND REHAB | Facility: CLINIC | Age: 57
End: 2022-01-19
Payer: COMMERCIAL

## 2022-01-19 VITALS — OXYGEN SATURATION: 95 % | HEIGHT: 63 IN | HEART RATE: 70 BPM | BODY MASS INDEX: 27.46 KG/M2 | WEIGHT: 155 LBS

## 2022-01-19 DIAGNOSIS — M54.16 BILATERAL LUMBAR RADICULOPATHY: Primary | ICD-10-CM

## 2022-01-19 DIAGNOSIS — M43.17 SPONDYLOLISTHESIS AT L5-S1 LEVEL: ICD-10-CM

## 2022-01-19 DIAGNOSIS — G62.81 CRITICAL ILLNESS POLYNEUROPATHY (HCC): ICD-10-CM

## 2022-01-19 PROCEDURE — 99213 OFFICE O/P EST LOW 20 MIN: CPT | Performed by: PHYSICAL MEDICINE & REHABILITATION

## 2022-01-19 PROCEDURE — 3008F BODY MASS INDEX DOCD: CPT | Performed by: PHYSICAL MEDICINE & REHABILITATION

## 2022-01-20 NOTE — TELEPHONE ENCOUNTER
Please review; protocol failed.  Or has no protocol    Requested Prescriptions   Pending Prescriptions Disp Refills    ROSUVASTATIN 5 MG Oral Tab [Pharmacy Med Name: ROSUVASTATIN 5MG TABLETS] 90 tablet 0     Sig: TAKE 1 TABLET(5 MG) BY MOUTH EVERY NIGHT

## 2022-01-21 RX ORDER — ROSUVASTATIN CALCIUM 5 MG/1
5 TABLET, COATED ORAL NIGHTLY
Qty: 90 TABLET | Refills: 0 | Status: SHIPPED | OUTPATIENT
Start: 2022-01-21

## 2022-01-21 RX ORDER — ROSUVASTATIN CALCIUM 5 MG/1
5 TABLET, COATED ORAL NIGHTLY
Qty: 90 TABLET | Refills: 1 | Status: SHIPPED | OUTPATIENT
Start: 2022-01-21

## 2022-01-21 NOTE — PROGRESS NOTES
[address]  FOLLOW UP EVALUATION      Chief Complaint: back pain. HISTORY OF PRESENT ILLNESS:   Patient presents with: Foot Pain: LOV: 11/19/21 Patient f/u on localized feet pain with N/T.  Taking [place] pain with radiation bilateral lower extremities. Is not as severe as previously and rates the pain to have improved with gabapentin medication. However she still has weakness limitations in function. She has not had MRI imaging of the lumbar spine.   She right hand in the middle finger just at the tip of the finger. She has had x-ray imaging of the lumbar spine as noted below which is notable for spondylolisthesis at L5-S1. She has not had any further imaging or specific treatment for the lumbar spine. ALLERGIES:   No Known Allergies      FAMILY HISTORY:     Family History   Problem Relation Age of Onset   • Diabetes Father    • Cancer Father         retroperitoneal sarcoma   • Diabetes Maternal Grandmother    • Heart Disorder Maternal Grandfathe palpation of the spinous process, lumbar paraspinals, tenderness to palpation of the left gluteus medius  ROM: FAROM  Strength: 5/5  Sensation: Intact to light touch   Reflexes: 2/4 at L4 and S1  Facet Loading: no specific facet pain  Slump test: negative facet joints in the lower lumbar spine and dextroscoliotic curve. All imaging results were reviewed and discussed with patient. ASSESSMENT:     1. Bilateral lumbar radiculopathy    2. Spondylolisthesis at L5-S1 level    3.  Critical illness polyne

## 2022-01-26 DIAGNOSIS — G62.81 CRITICAL ILLNESS POLYNEUROPATHY (HCC): ICD-10-CM

## 2022-01-26 RX ORDER — GABAPENTIN 300 MG/1
CAPSULE ORAL
Qty: 90 CAPSULE | Refills: 3 | Status: SHIPPED | OUTPATIENT
Start: 2022-01-28

## 2022-01-26 NOTE — TELEPHONE ENCOUNTER
Medication request: gabapentin 300 MG Oral Cap  Sig:   Take 1 capsule (300 mg total) by mouth 3 (three) times daily    LOV: 1/19/2022  NOV: 7/6/2022    [place]: 12/28/2021  Q-90 R-0

## 2022-02-28 RX ORDER — OMEPRAZOLE 20 MG/1
CAPSULE, DELAYED RELEASE ORAL
Qty: 90 CAPSULE | Refills: 1 | Status: SHIPPED | OUTPATIENT
Start: 2022-02-28

## 2022-02-28 NOTE — TELEPHONE ENCOUNTER
Refill passed per [place] [employer], [facility] protocol.    Requested Prescriptions   Pending Prescriptions Disp Refills    METFORMIN 500 MG Oral Tab [Pharmacy Med Name: METFORMIN 500MG TABLETS] 270 tablet 0     Sig: TAKE 1 AND 1/2 TABLETS(750 MG) BY MOUTH TWICE DAILY WITH MEALS        Diabetes Medication Protocol Passed - 2/28/2022 11:33 AM        Passed - Last A1C < 7.5 and within past 6 months     Lab Results   Component Value Date    A1C 6.2 (H) 10/20/2021               Passed - Appointment in past 6 or next 3 months        Passed - GFR Non- > 50     Lab Results   Component Value Date    GFRNAA 88 10/20/2021                 Passed - GFR in the past 12 months           OMEPRAZOLE 20 MG Oral Capsule Delayed Release [Pharmacy Med Name: OMEPRAZOLE 20MG CAPSULES] 90 capsule 0     Sig: TAKE 1 CAPSULE BY MOUTH EVERY DAY FOR ACID REFLUX AND HISTORY OF BLEEDING FROM GI TRACT        Gastrointestional Medication Protocol Passed - 2/28/2022 11:33 AM        Passed - Appointment in past 15 or next 3 months             Recent Outpatient Visits              1 month ago Bilateral lumbar radiculopathy    [facility], Lizzy Living, DO    Office Visit    1 month ago Herpes zoster without complication    [address] [facility], [address], [place] Addis Grace MD    Office Visit    3 months ago Bilateral lumbar radiculopathy    [facility], [facility], [address]    3 months ago Bilateral lumbar radiculopathy    [facility] Rex Alanis DO    Office Visit    4 months ago Bilateral lumbar radiculopathy    [address] [facility]-Phys[facility], Lizzy Umaña, DO    Telemedicine           Future Appointments         Provider Department Appt Notes    In 2 days Dorita Rowell MD [facility] 6 mth f/u  lm TO MOVE TO 3/2 OR 3/16 - ek    In 1 month Addis Grace MD [facility], Main [address], [place] 3 MOS FOLLOW UP    In 4 months Shad Gipson, [facility], [place]-Physiatry 6 MONTHS IN OFFICE.

## 2022-03-02 ENCOUNTER — OFFICE VISIT (OUTPATIENT)
Dept: SURGERY | Facility: CLINIC | Age: 57
End: 2022-03-02
Payer: COMMERCIAL

## 2022-03-02 VITALS
SYSTOLIC BLOOD PRESSURE: 160 MMHG | OXYGEN SATURATION: 97 % | DIASTOLIC BLOOD PRESSURE: 88 MMHG | RESPIRATION RATE: 16 BRPM | HEART RATE: 68 BPM | WEIGHT: 166 LBS | BODY MASS INDEX: 29 KG/M2

## 2022-03-02 DIAGNOSIS — D49.0 MUCINOUS NEOPLASM OF PANCREAS: Primary | ICD-10-CM

## 2022-03-02 PROCEDURE — 3079F DIAST BP 80-89 MM HG: CPT | Performed by: SURGERY

## 2022-03-02 PROCEDURE — 99214 OFFICE O/P EST MOD 30 MIN: CPT | Performed by: SURGERY

## 2022-03-02 PROCEDURE — 3077F SYST BP >= 140 MM HG: CPT | Performed by: SURGERY

## 2022-04-12 ENCOUNTER — OFFICE VISIT (OUTPATIENT)
Dept: INTERNAL MEDICINE CLINIC | Facility: CLINIC | Age: 57
End: 2022-04-12
Payer: COMMERCIAL

## 2022-04-12 VITALS
HEART RATE: 64 BPM | BODY MASS INDEX: 30.3 KG/M2 | SYSTOLIC BLOOD PRESSURE: 128 MMHG | WEIGHT: 171 LBS | DIASTOLIC BLOOD PRESSURE: 75 MMHG | HEIGHT: 63 IN

## 2022-04-12 DIAGNOSIS — K21.9 GASTROESOPHAGEAL REFLUX DISEASE WITHOUT ESOPHAGITIS: ICD-10-CM

## 2022-04-12 DIAGNOSIS — E11.9 DIABETIC EYE EXAM (HCC): ICD-10-CM

## 2022-04-12 DIAGNOSIS — E11.9 TYPE 2 DIABETES MELLITUS WITHOUT COMPLICATION, WITHOUT LONG-TERM CURRENT USE OF INSULIN (HCC): Primary | ICD-10-CM

## 2022-04-12 DIAGNOSIS — E78.00 PURE HYPERCHOLESTEROLEMIA: ICD-10-CM

## 2022-04-12 DIAGNOSIS — Z01.00 DIABETIC EYE EXAM (HCC): ICD-10-CM

## 2022-04-12 PROCEDURE — 3074F SYST BP LT 130 MM HG: CPT | Performed by: INTERNAL MEDICINE

## 2022-04-12 PROCEDURE — 3008F BODY MASS INDEX DOCD: CPT | Performed by: INTERNAL MEDICINE

## 2022-04-12 PROCEDURE — 3078F DIAST BP <80 MM HG: CPT | Performed by: INTERNAL MEDICINE

## 2022-04-12 PROCEDURE — 99214 OFFICE O/P EST MOD 30 MIN: CPT | Performed by: INTERNAL MEDICINE

## 2022-05-06 ENCOUNTER — TELEPHONE (OUTPATIENT)
Dept: NEUROLOGY | Facility: CLINIC | Age: 57
End: 2022-05-06

## 2022-05-06 NOTE — TELEPHONE ENCOUNTER
[employer] for authorization of approval for Bilateral S1 Transforaminal Epidural Steroid Injection under fluoroscopy cpt codes [id number], 56787. Authorization # [id number] valid 05/16/2022 - 06/14/2022. Will inform Nursing. Saida Hayden

## 2022-05-16 ENCOUNTER — TELEPHONE (OUTPATIENT)
Dept: PHYSICAL MEDICINE AND REHAB | Facility: CLINIC | Age: 57
End: 2022-05-16

## 2022-05-16 ENCOUNTER — OFFICE VISIT (OUTPATIENT)
Dept: SURGERY | Facility: CLINIC | Age: 57
End: 2022-05-16

## 2022-05-16 DIAGNOSIS — M54.16 BILATERAL LUMBAR RADICULOPATHY: Primary | ICD-10-CM

## 2022-05-16 PROCEDURE — 64483 NJX AA&/STRD TFRM EPI L/S 1: CPT | Performed by: PHYSICAL MEDICINE & REHABILITATION

## 2022-05-16 NOTE — PROCEDURES
[address]    BILATERAL S1 TFESI   NAME:  Meri Khan    MR #:    [id number] :  10/18/1965     PHYSICIAN:  Jose Miguel To. DO [place]        Operative Report    DATE OF PROCEDURE: 2022   PREOPERATIVE DIAGNOSES: 1. Bilateral lumbar radiculopathy        POSTOPERATIVE DIAGNOSES:   1. Bilateral lumbar radiculopathy        PROCEDURES: Bilateral S1 transforaminal epidural steroid injections done under fluoroscopic guidance with contrast enhancement. SURGEON: Jose Miguel To. Yanira Rosas DO   ANESTHESIA: Local   INDICATIONS:      OPERATIVE PROCEDURE:  Written consent was obtained from the patient. The patient was brought into the operating room and placed in the prone position on the fluoroscopy table with pillow underneath the abdomen. The patient's skin was cleaned and draped in a normal sterile fashion. Using AP fluoroscopy, all five lumbar vertebrae were identified. Then the bilateral first pedicles were identified with the bilateral first sacral foramen inferior to them. Then, 5 inch inch, 22-gauge spinal needles were inserted and directed towards the bilateral first sacral foramen. When they were felt to be in good position, Omnipaque-240 contrast was used to obtain a good epidurograms indicating correct needle placement. Then, aspiration was performed. No blood, fluid, or air was aspirated. Then, the patient was injected with a 4 cc solution of 2 cc of 6 mg/cc of Celestone and 2 cc of 1% PF lidocaine without epinephrine on each side. After this, the needles were removed. The patient's skin was cleaned. Band-Aids were applied. The patient was transferred to the cart and into [facility]. The patient was given discharge instructions and will follow up in the clinic as scheduled. Throughout the whole procedure, the patient's pulse oximetry and vital signs were monitored and they remained completely stable.   Also, throughout the whole procedure, prior to injection of any medication, aspiration was performed. No blood, fluid, or air was aspirated at anytime.     Dutch Topete DO, FAAPMR & CAQSM  Physical Medicine and Rehabilitation/Sports Medicine  [facility] [place]

## 2022-05-20 DIAGNOSIS — G62.81 CRITICAL ILLNESS POLYNEUROPATHY (HCC): ICD-10-CM

## 2022-05-20 RX ORDER — GABAPENTIN 300 MG/1
CAPSULE ORAL
Qty: 90 CAPSULE | Refills: 3 | Status: SHIPPED | OUTPATIENT
Start: 2022-05-20

## 2022-05-20 NOTE — TELEPHONE ENCOUNTER
Refill Request    Medication request: GABAPENTIN 300 MG Oral Cap    LOV:5/16/22 [facility] Terence Castillo DO   Due back to clinic per last office note:  post injection  NOV: 6/1/2022 Terence Castillo DO      [place]: 4/22/22 #90    Urine drug screen (if applicable): None  Pain contract: None    LOV plan (if weaning or changing medications): N/A.

## 2022-06-01 ENCOUNTER — HOSPITAL ENCOUNTER (OUTPATIENT)
Dept: GENERAL RADIOLOGY | Age: 57
Discharge: HOME | End: 2022-06-01
Attending: PHYSICAL MEDICINE & REHABILITATION
Payer: COMMERCIAL

## 2022-06-01 ENCOUNTER — OFFICE VISIT (OUTPATIENT)
Dept: PHYSICAL MEDICINE AND REHAB | Facility: CLINIC | Age: 57
End: 2022-06-01
Payer: COMMERCIAL

## 2022-06-01 ENCOUNTER — TELEPHONE (OUTPATIENT)
Dept: NEUROLOGY | Facility: CLINIC | Age: 57
End: 2022-06-01

## 2022-06-01 VITALS
DIASTOLIC BLOOD PRESSURE: 88 MMHG | BODY MASS INDEX: 27.31 KG/M2 | HEART RATE: 64 BPM | SYSTOLIC BLOOD PRESSURE: 124 MMHG | OXYGEN SATURATION: 95 % | WEIGHT: 160 LBS | HEIGHT: 64 IN

## 2022-06-01 DIAGNOSIS — K86.89 PANCREATIC MASS: ICD-10-CM

## 2022-06-01 DIAGNOSIS — M54.16 RIGHT LUMBAR RADICULOPATHY: Primary | ICD-10-CM

## 2022-06-01 DIAGNOSIS — R29.898 WEAKNESS OF BOTH LOWER EXTREMITIES: ICD-10-CM

## 2022-06-01 DIAGNOSIS — M43.17 SPONDYLOLISTHESIS AT L5-S1 LEVEL: ICD-10-CM

## 2022-06-01 DIAGNOSIS — M54.16 RIGHT LUMBAR RADICULOPATHY: ICD-10-CM

## 2022-06-01 DIAGNOSIS — G62.81 CRITICAL ILLNESS POLYNEUROPATHY (HCC): ICD-10-CM

## 2022-06-01 PROCEDURE — 3074F SYST BP LT 130 MM HG: CPT | Performed by: PHYSICAL MEDICINE & REHABILITATION

## 2022-06-01 PROCEDURE — 72114 X-RAY EXAM L-S SPINE BENDING: CPT | Performed by: PHYSICAL MEDICINE & REHABILITATION

## 2022-06-01 PROCEDURE — 99214 OFFICE O/P EST MOD 30 MIN: CPT | Performed by: PHYSICAL MEDICINE & REHABILITATION

## 2022-06-01 PROCEDURE — 3079F DIAST BP 80-89 MM HG: CPT | Performed by: PHYSICAL MEDICINE & REHABILITATION

## 2022-06-01 PROCEDURE — 3008F BODY MASS INDEX DOCD: CPT | Performed by: PHYSICAL MEDICINE & REHABILITATION

## 2022-06-01 RX ORDER — GABAPENTIN 600 MG/1
600 TABLET ORAL 3 TIMES DAILY
Qty: 90 TABLET | Refills: 0 | Status: SHIPPED | OUTPATIENT
Start: 2022-06-01

## 2022-06-01 NOTE — PATIENT INSTRUCTIONS
-Gabapentin 600mg three times daily  -Xray of the lumbar spine today  -My office will call once injection is approved

## 2022-06-01 NOTE — TELEPHONE ENCOUNTER
[employer] for authorization of approval for Right L5 and S1 Transforaminal Epidural Steroid Injection under fluoroscopy cpt codes 84458-UJ, 50960-AV, 11495. Authorization # [id number] valid 06/13/22-07/12/22. Will  inform Nursing.

## 2022-06-02 NOTE — TELEPHONE ENCOUNTER
Patient has been scheduled for Right L5 and S1 Transforaminal Epidural Steroid Injection under fluoroscopy on 6/13/22 at the [facility] with Dr. Terrence Kidd.   -Anesthesia type: Local.  -If receiving MAC or IVC sedation patient will need to get COVID tested 3 days prior even if already vaccinated (order placed by [facility].)  -If scheduling [address] covid testing required for all procedures whether patient is vaccinated or not. -Patient informed not to eat or drink anything after midnight the night prior to the procedure, if being sedated. -Patient was advised that if he/she does receive the covid vaccine it needs to be at least 2 weeks before or after the injection. -Medications and allergies reviewed. -Patient reminded to hold NSAIDs (Ibuprofen, ASA 81, Aleve, Naproxen, Mobic etc.) for 3 days prior to [place]  if BMI is greater than 35. For Cervical injections only hold multivitamins, Vitamin E, Fish Oil, Phentermine (Lomaira) for 7 days prior to injection and NSAIDS.  mg to be held for 7 days prior to injections.  -If patient is receiving MAC/IVCS Phentermine Wyvonnia Shine) will need to be held for 7 days prior to injection.  -If on blood thinner clearance has been received to hold this medication by provider.   -Patient informed he/she will need a  to and from procedure. -[facility] is located in the [facility] 1st floor. Patient may park in the yellow parking. Patient verbalized understanding and agrees with plan.  -----> Scheduled in Epic: Yes  -----> Scheduled in [employer]:  Yes

## 2022-06-02 NOTE — TELEPHONE ENCOUNTER
LMTCB 1st attempt    Procedure: Right L5 and S1 Transforaminal Epidural Steroid Injection under fluoroscopy  Anesthesia: Local  Dx: M54.16  Location: [address] [place] [address]  CPT Codes: I5123256, [id number], 49563

## 2022-06-13 ENCOUNTER — OFFICE VISIT (OUTPATIENT)
Dept: SURGERY | Facility: CLINIC | Age: 57
End: 2022-06-13

## 2022-06-13 DIAGNOSIS — M54.16 BILATERAL LUMBAR RADICULOPATHY: Primary | ICD-10-CM

## 2022-06-23 ENCOUNTER — OFFICE VISIT (OUTPATIENT)
Dept: OPHTHALMOLOGY | Facility: CLINIC | Age: 57
End: 2022-06-23
Payer: COMMERCIAL

## 2022-06-23 DIAGNOSIS — H02.713: ICD-10-CM

## 2022-06-23 DIAGNOSIS — H25.13 AGE-RELATED NUCLEAR CATARACT OF BOTH EYES: ICD-10-CM

## 2022-06-23 DIAGNOSIS — E11.9 DIABETES MELLITUS TYPE 2 WITHOUT RETINOPATHY (HCC): Primary | ICD-10-CM

## 2022-06-23 PROCEDURE — 92015 DETERMINE REFRACTIVE STATE: CPT | Performed by: OPHTHALMOLOGY

## 2022-06-23 PROCEDURE — 92004 COMPRE OPH EXAM NEW PT 1/>: CPT | Performed by: OPHTHALMOLOGY

## 2022-06-23 PROCEDURE — 2023F DILAT RTA XM W/O RTNOPTHY: CPT | Performed by: OPHTHALMOLOGY

## 2022-06-23 NOTE — PATIENT INSTRUCTIONS
Age-related nuclear cataract of both eyes  Discussed early cataracts with patient. Told patient that cataracts are age appropriate and they are not surgical at this time. No treatment recommended at this time. Diabetes mellitus type 2 without retinopathy ([address]  Diabetes type II: no background of retinopathy, no signs of neovascularization noted. Discussed ocular and systemic benefits of blood sugar control. Diagnosis and treatment discussed in detail with patient. Hyperpigmentation of right eyelid  Prolapsed orbital fat lower lids inferiorly - information given to see Dr. Johnathon Lopez for consultation and treatment.

## 2022-06-23 NOTE — ASSESSMENT & PLAN NOTE
Prolapsed orbital fat lower lids inferiorly - information given to see Dr. Brandy Lauren for consultation and treatment.

## 2022-06-24 ENCOUNTER — TELEMEDICINE (OUTPATIENT)
Dept: PHYSICAL MEDICINE AND REHAB | Facility: CLINIC | Age: 57
End: 2022-06-24

## 2022-06-24 DIAGNOSIS — G62.81 CRITICAL ILLNESS POLYNEUROPATHY (HCC): ICD-10-CM

## 2022-06-24 DIAGNOSIS — M54.16 RIGHT LUMBAR RADICULOPATHY: Primary | ICD-10-CM

## 2022-06-24 DIAGNOSIS — M43.17 SPONDYLOLISTHESIS AT L5-S1 LEVEL: ICD-10-CM

## 2022-06-24 DIAGNOSIS — K86.89 PANCREATIC MASS: ICD-10-CM

## 2022-06-24 PROCEDURE — 99214 OFFICE O/P EST MOD 30 MIN: CPT | Performed by: PHYSICAL MEDICINE & REHABILITATION

## 2022-06-24 RX ORDER — GABAPENTIN 600 MG/1
600 TABLET ORAL 3 TIMES DAILY
Qty: 270 TABLET | Refills: 1 | Status: SHIPPED | OUTPATIENT
Start: 2022-06-24

## 2022-06-28 ENCOUNTER — TELEPHONE (OUTPATIENT)
Dept: PHYSICAL MEDICINE AND REHAB | Facility: CLINIC | Age: 57
End: 2022-06-28

## 2022-07-19 RX ORDER — ROSUVASTATIN CALCIUM 5 MG/1
TABLET, COATED ORAL
Qty: 90 TABLET | Refills: 1 | Status: SHIPPED | OUTPATIENT
Start: 2022-07-19

## 2022-08-18 DIAGNOSIS — K21.9 GASTROESOPHAGEAL REFLUX DISEASE WITHOUT ESOPHAGITIS: ICD-10-CM

## 2022-08-18 RX ORDER — OMEPRAZOLE 20 MG/1
CAPSULE, DELAYED RELEASE ORAL
Qty: 90 CAPSULE | Refills: 1 | Status: SHIPPED | OUTPATIENT
Start: 2022-08-18

## 2022-08-30 ENCOUNTER — TELEPHONE (OUTPATIENT)
Dept: INTERNAL MEDICINE CLINIC | Facility: CLINIC | Age: 57
End: 2022-08-30

## 2022-08-30 ENCOUNTER — OFFICE VISIT (OUTPATIENT)
Dept: INTERNAL MEDICINE CLINIC | Facility: CLINIC | Age: 57
End: 2022-08-30
Payer: COMMERCIAL

## 2022-08-30 VITALS
WEIGHT: 188 LBS | BODY MASS INDEX: 32.1 KG/M2 | HEART RATE: 63 BPM | SYSTOLIC BLOOD PRESSURE: 132 MMHG | HEIGHT: 64 IN | DIASTOLIC BLOOD PRESSURE: 82 MMHG

## 2022-08-30 DIAGNOSIS — E11.9 DIABETES MELLITUS TYPE 2 WITHOUT RETINOPATHY (HCC): ICD-10-CM

## 2022-08-30 DIAGNOSIS — E55.9 VITAMIN D DEFICIENCY: ICD-10-CM

## 2022-08-30 DIAGNOSIS — E78.00 PURE HYPERCHOLESTEROLEMIA: Primary | ICD-10-CM

## 2022-08-30 DIAGNOSIS — M54.16 BILATERAL LUMBAR RADICULOPATHY: ICD-10-CM

## 2022-08-30 PROCEDURE — 3008F BODY MASS INDEX DOCD: CPT | Performed by: INTERNAL MEDICINE

## 2022-08-30 PROCEDURE — 3075F SYST BP GE 130 - 139MM HG: CPT | Performed by: INTERNAL MEDICINE

## 2022-08-30 PROCEDURE — 99214 OFFICE O/P EST MOD 30 MIN: CPT | Performed by: INTERNAL MEDICINE

## 2022-08-30 PROCEDURE — 3079F DIAST BP 80-89 MM HG: CPT | Performed by: INTERNAL MEDICINE

## 2022-09-19 NOTE — TELEPHONE ENCOUNTER
Refill Request    Medication request: GABAPENTIN 300 MG CAPSULES. TAKE 1 TABLET (300MG TOTAL) BY MOUTH 3 (THREE) TIMES DAILY. EZK:4/0/8929 & 06/24/2022 (TELEMED) Buck Willams,    Due back to clinic per last office note: Per Dr. Kelsey Lopez: \"Follow-up:  6 months. \"  NOV: Visit date not found      [place]: 08/18/2022 (300MG) #90   NOTE: Requested order changed by this RN to reflect Dr. Yari Morris guidelines from [address] [place] [address] - 600MG TID. No other patient encounter notes any adverse effects that pt experienced to revert back to 300MG dose. Urine drug screen (if applicable): n/a  Pain contract: none    LOV plan (if weaning or changing medications): Per Dr. Kelsey Lopez: Brandi Bean will continue gabapentin 600 mg 3 times daily. \"

## 2022-10-25 ENCOUNTER — TELEPHONE (OUTPATIENT)
Dept: SURGERY | Facility: CLINIC | Age: 57
End: 2022-10-25

## 2022-10-25 DIAGNOSIS — D49.0 MUCINOUS NEOPLASM OF PANCREAS: Primary | ICD-10-CM

## 2022-10-25 NOTE — TELEPHONE ENCOUNTER
LVMTCB regarding overdue imaging.  imaging reordered    Goyo Woody PA-C  Department of [facility]  [address] [facility] [place] [address] [place], [address]  T: [phone]  F: [phone]

## 2022-11-21 DIAGNOSIS — E11.9 TYPE 2 DIABETES MELLITUS WITHOUT COMPLICATION, WITHOUT LONG-TERM CURRENT USE OF INSULIN (HCC): ICD-10-CM

## 2022-11-21 NOTE — TELEPHONE ENCOUNTER
"Chief Complaint   Patient presents with     Hospital F/U       Initial BP (!) 218/100  Pulse 79  Temp 98.1  F (36.7  C) (Oral)  Wt 187 lb 14.4 oz (85.2 kg)  SpO2 99%  BMI 28.57 kg/m2 Estimated body mass index is 28.57 kg/(m^2) as calculated from the following:    Height as of 9/25/17: 5' 8\" (1.727 m).    Weight as of this encounter: 187 lb 14.4 oz (85.2 kg).  Medication Reconciliation: complete    " Please review. Protocol failed / No protocol.     Requested Prescriptions   Pending Prescriptions Disp Refills    METFORMIN 500 MG Oral Tab [Pharmacy Med Name: METFORMIN 500MG TABLETS] 270 tablet 1     Sig: TAKE 1 AND 1/2 TABLETS(750 MG) BY MOUTH TWICE DAILY WITH MEALS       Diabetes Medication Protocol Failed - 11/21/2022  9:25 AM        Failed - Last A1C < 7.5 and within past 6 months     Lab Results   Component Value Date    A1C 6.2 (H) 10/20/2021             Failed - EGFRCR or GFRNAA > 50     GFR Evaluation            Failed - GFR in the past 12 months        Passed - In person appointment or virtual visit in the past 6 mos or appointment in next 3 mos     Recent Outpatient Visits              2 months ago Pure hypercholesterolemia    [facility], [facility], [address], [place] Ricardo Ramos MD    Office Visit    5 months ago Right lumbar radiculopathy    [facility]    Telemedicine    5 months ago Diabetes mellitus type 2 without retinopathy [facility])    [facility] Estell Koyanagi, MD    Office Visit    5 months ago Bilateral lumbar radiculopathy    [facility]     Office Visit    5 months ago Right lumbar radiculopathy    CANDIDA Cant[place]y, [place], [facility],     Office Visit          Future Appointments         Provider Department Appt Notes    Tomorrow [facility] MRI RM1 (1.5T) [facility] MRI Epic order from Olive Bobby PA-C on 11/7/22 - [address]  Est final    In 1 week Ricardo Ramos MD [place] [employer] [facility], [facility], [address], [place] 3 mo f/u    In 7 months Estell Koyanagi, MD [place] [facility] DM EE                     Recent Outpatient Visits              2 months ago Pure hypercholesterolemia    Hermelindo Carey Seltjarnarnes, MD    Office Visit    5 months ago Right lumbar radiculopathy    [address] [facility], [place]-Physiatry Nataly Dockery, DO    Telemedicine    5 months ago Diabetes mellitus type 2 without retinopathy [facility])    [facility] Cuco Taylor MD    Office Visit    5 months ago Bilateral lumbar radiculopathy    [facility] Nataly Dockery, DO    Office Visit    5 months ago Right lumbar radiculopathy    [facility],     Office Visit             Future Appointments         Provider Department Appt Notes    Tomorrow [facility] MRI RM1 (1.5T) [facility] for [address] order from Jas Eli PA-C on 11/7/22 - [address]  Est final    In 1 week Roslyn Romo MD [facility], [facility], [address], [place] 3 mo f/u    In 7 months Cuco Taylor MD [facility] DM EE

## 2022-11-22 ENCOUNTER — HOSPITAL ENCOUNTER (OUTPATIENT)
Dept: MRI IMAGING | Facility: HOSPITAL | Age: 57
Discharge: HOME | End: 2022-11-22
Attending: PHYSICIAN ASSISTANT
Payer: COMMERCIAL

## 2022-11-22 DIAGNOSIS — K86.89 PANCREATIC MASS: ICD-10-CM

## 2022-11-22 LAB
CREAT BLD-MCNC: 0.8 MG/DL
GFR SERPLBLD BASED ON 1.73 SQ M-ARVRAT: 87 ML/MIN/1.73M2 (ref 60–?)

## 2022-11-22 PROCEDURE — 82565 ASSAY OF CREATININE: CPT

## 2022-11-22 PROCEDURE — 74183 MRI ABD W/O CNTR FLWD CNTR: CPT | Performed by: PHYSICIAN ASSISTANT

## 2022-11-22 PROCEDURE — A9575 INJ GADOTERATE MEGLUMI 0.1ML: HCPCS | Performed by: PHYSICIAN ASSISTANT

## 2022-11-22 RX ORDER — GADOTERATE MEGLUMINE 376.9 MG/ML
15 INJECTION INTRAVENOUS
Status: COMPLETED | OUTPATIENT
Start: 2022-11-22 | End: 2022-11-22

## 2022-11-22 RX ADMIN — GADOTERATE MEGLUMINE 14 ML: 376.9 INJECTION INTRAVENOUS at 08:53:00

## 2022-11-29 ENCOUNTER — LAB ENCOUNTER (OUTPATIENT)
Dept: LAB | Age: 57
End: 2022-11-29
Attending: INTERNAL MEDICINE
Payer: COMMERCIAL

## 2022-11-29 ENCOUNTER — TELEPHONE (OUTPATIENT)
Dept: INTERNAL MEDICINE CLINIC | Facility: CLINIC | Age: 57
End: 2022-11-29

## 2022-11-29 DIAGNOSIS — E11.9 TYPE 2 DIABETES MELLITUS WITHOUT COMPLICATION, WITHOUT LONG-TERM CURRENT USE OF INSULIN (HCC): ICD-10-CM

## 2022-11-29 DIAGNOSIS — E78.00 PURE HYPERCHOLESTEROLEMIA: ICD-10-CM

## 2022-11-29 DIAGNOSIS — E55.9 VITAMIN D DEFICIENCY: ICD-10-CM

## 2022-11-29 DIAGNOSIS — D64.9 ANEMIA, UNSPECIFIED TYPE: ICD-10-CM

## 2022-11-29 DIAGNOSIS — E11.9 TYPE 2 DIABETES MELLITUS WITHOUT COMPLICATION, WITHOUT LONG-TERM CURRENT USE OF INSULIN (HCC): Primary | ICD-10-CM

## 2022-11-29 PROBLEM — E11.65 DM HYPEROSMOLARITY TYPE II, UNCONTROLLED (HCC): Status: ACTIVE | Noted: 2020-10-31

## 2022-11-29 PROBLEM — R73.9 HYPERGLYCEMIA: Status: ACTIVE | Noted: 2020-10-29

## 2022-11-29 PROBLEM — J96.01 ACUTE RESPIRATORY FAILURE WITH HYPOXIA (HCC): Status: ACTIVE | Noted: 2020-10-29

## 2022-11-29 PROBLEM — R79.89 ABNORMAL LFTS: Status: ACTIVE | Noted: 2020-10-29

## 2022-11-29 PROBLEM — E11.00 DM HYPEROSMOLARITY TYPE II, UNCONTROLLED (HCC): Status: ACTIVE | Noted: 2020-10-31

## 2022-11-29 LAB
ALBUMIN SERPL-MCNC: 3.8 G/DL (ref 3.4–5)
ALBUMIN/GLOB SERPL: 1.1 {RATIO} (ref 1–2)
ALP LIVER SERPL-CCNC: 97 U/L
ALT SERPL-CCNC: 24 U/L
ANION GAP SERPL CALC-SCNC: 5 MMOL/L (ref 0–18)
AST SERPL-CCNC: 24 U/L (ref 15–37)
BASOPHILS # BLD AUTO: 0.08 X10(3) UL (ref 0–0.2)
BASOPHILS NFR BLD AUTO: 1.1 %
BILIRUB SERPL-MCNC: 0.6 MG/DL (ref 0.1–2)
BUN BLD-MCNC: 22 MG/DL (ref 7–18)
BUN/CREAT SERPL: 20.8 (ref 10–20)
CALCIUM BLD-MCNC: 9.9 MG/DL (ref 8.5–10.1)
CHLORIDE SERPL-SCNC: 101 MMOL/L (ref 98–112)
CHOLEST SERPL-MCNC: 196 MG/DL (ref ?–200)
CO2 SERPL-SCNC: 27 MMOL/L (ref 21–32)
CREAT BLD-MCNC: 1.06 MG/DL
DEPRECATED HBV CORE AB SER IA-ACNC: 67.5 NG/ML
DEPRECATED RDW RBC AUTO: 42.7 FL (ref 35.1–46.3)
EOSINOPHIL # BLD AUTO: 0.48 X10(3) UL (ref 0–0.7)
EOSINOPHIL NFR BLD AUTO: 6.8 %
ERYTHROCYTE [DISTWIDTH] IN BLOOD BY AUTOMATED COUNT: 13 % (ref 11–15)
EST. AVERAGE GLUCOSE BLD GHB EST-MCNC: 303 MG/DL (ref 68–126)
FASTING PATIENT LIPID ANSWER: YES
FASTING STATUS PATIENT QL REPORTED: YES
FOLATE SERPL-MCNC: 19.2 NG/ML (ref 8.7–?)
GFR SERPLBLD BASED ON 1.73 SQ M-ARVRAT: 62 ML/MIN/1.73M2 (ref 60–?)
GLOBULIN PLAS-MCNC: 3.5 G/DL (ref 2.8–4.4)
GLUCOSE BLD-MCNC: 340 MG/DL (ref 70–99)
HBA1C MFR BLD: 12.2 % (ref ?–5.7)
HCT VFR BLD AUTO: 40.8 %
HDLC SERPL-MCNC: 67 MG/DL (ref 40–59)
HGB BLD-MCNC: 13.6 G/DL
IMM GRANULOCYTES # BLD AUTO: 0.02 X10(3) UL (ref 0–1)
IMM GRANULOCYTES NFR BLD: 0.3 %
IRON SATN MFR SERPL: 7 %
IRON SERPL-MCNC: 31 UG/DL
LDLC SERPL CALC-MCNC: 90 MG/DL (ref ?–100)
LYMPHOCYTES # BLD AUTO: 1.55 X10(3) UL (ref 1–4)
LYMPHOCYTES NFR BLD AUTO: 21.8 %
MCH RBC QN AUTO: 29.8 PG (ref 26–34)
MCHC RBC AUTO-ENTMCNC: 33.3 G/DL (ref 31–37)
MCV RBC AUTO: 89.5 FL
MONOCYTES # BLD AUTO: 0.7 X10(3) UL (ref 0.1–1)
MONOCYTES NFR BLD AUTO: 9.9 %
NEUTROPHILS # BLD AUTO: 4.27 X10 (3) UL (ref 1.5–7.7)
NEUTROPHILS # BLD AUTO: 4.27 X10(3) UL (ref 1.5–7.7)
NEUTROPHILS NFR BLD AUTO: 60.1 %
NONHDLC SERPL-MCNC: 129 MG/DL (ref ?–130)
OSMOLALITY SERPL CALC.SUM OF ELEC: 293 MOSM/KG (ref 275–295)
PLATELET # BLD AUTO: 249 10(3)UL (ref 150–450)
POTASSIUM SERPL-SCNC: 5.4 MMOL/L (ref 3.5–5.1)
PROT SERPL-MCNC: 7.3 G/DL (ref 6.4–8.2)
RBC # BLD AUTO: 4.56 X10(6)UL
SODIUM SERPL-SCNC: 133 MMOL/L (ref 136–145)
TIBC SERPL-MCNC: 459 UG/DL (ref 240–450)
TRANSFERRIN SERPL-MCNC: 308 MG/DL (ref 200–360)
TRIGL SERPL-MCNC: 236 MG/DL (ref 30–149)
VIT B12 SERPL-MCNC: 416 PG/ML (ref 193–986)
VIT D+METAB SERPL-MCNC: 16 NG/ML (ref 30–100)
VLDLC SERPL CALC-MCNC: 38 MG/DL (ref 0–30)
WBC # BLD AUTO: 7.1 X10(3) UL (ref 4–11)

## 2022-11-29 PROCEDURE — 80053 COMPREHEN METABOLIC PANEL: CPT

## 2022-11-29 PROCEDURE — 83540 ASSAY OF IRON: CPT

## 2022-11-29 PROCEDURE — 82306 VITAMIN D 25 HYDROXY: CPT

## 2022-11-29 PROCEDURE — 84466 ASSAY OF TRANSFERRIN: CPT

## 2022-11-29 PROCEDURE — 82728 ASSAY OF FERRITIN: CPT

## 2022-11-29 PROCEDURE — 82746 ASSAY OF FOLIC ACID SERUM: CPT

## 2022-11-29 PROCEDURE — 80061 LIPID PANEL: CPT

## 2022-11-29 PROCEDURE — 85025 COMPLETE CBC W/AUTO DIFF WBC: CPT

## 2022-11-29 PROCEDURE — 83036 HEMOGLOBIN GLYCOSYLATED A1C: CPT

## 2022-11-29 PROCEDURE — 36415 COLL VENOUS BLD VENIPUNCTURE: CPT

## 2022-11-29 PROCEDURE — 3046F HEMOGLOBIN A1C LEVEL >9.0%: CPT | Performed by: INTERNAL MEDICINE

## 2022-11-29 PROCEDURE — 82607 VITAMIN B-12: CPT

## 2022-11-29 RX ORDER — TRAMADOL HYDROCHLORIDE 50 MG/1
1 TABLET ORAL EVERY 6 HOURS PRN
COMMUNITY
Start: 2021-09-03 | End: 2022-11-30

## 2022-11-29 RX ORDER — GABAPENTIN 300 MG/1
CAPSULE ORAL
COMMUNITY
Start: 2022-08-18 | End: 2022-11-30

## 2022-11-29 NOTE — TELEPHONE ENCOUNTER
Was notified by the  that pt wanted to get her labs drawn today. Looked into pts chart and all labs have been . Rec'd verbal order from Dr. Paschal Hatchet to reorder labs. Labs reordered and informed .

## 2022-11-30 ENCOUNTER — OFFICE VISIT (OUTPATIENT)
Dept: SURGERY | Facility: CLINIC | Age: 57
End: 2022-11-30
Payer: COMMERCIAL

## 2022-11-30 VITALS
TEMPERATURE: 97 F | HEART RATE: 72 BPM | BODY MASS INDEX: 33.91 KG/M2 | DIASTOLIC BLOOD PRESSURE: 86 MMHG | OXYGEN SATURATION: 96 % | SYSTOLIC BLOOD PRESSURE: 141 MMHG | RESPIRATION RATE: 16 BRPM | HEIGHT: 63 IN | WEIGHT: 191.38 LBS

## 2022-11-30 DIAGNOSIS — D49.0 MUCINOUS NEOPLASM OF PANCREAS: Primary | ICD-10-CM

## 2022-11-30 PROCEDURE — 3079F DIAST BP 80-89 MM HG: CPT | Performed by: SURGERY

## 2022-11-30 PROCEDURE — 3008F BODY MASS INDEX DOCD: CPT | Performed by: SURGERY

## 2022-11-30 PROCEDURE — 3077F SYST BP >= 140 MM HG: CPT | Performed by: SURGERY

## 2022-11-30 PROCEDURE — 99215 OFFICE O/P EST HI 40 MIN: CPT | Performed by: SURGERY

## 2022-12-02 ENCOUNTER — OFFICE VISIT (OUTPATIENT)
Dept: INTERNAL MEDICINE CLINIC | Facility: CLINIC | Age: 57
End: 2022-12-02
Payer: COMMERCIAL

## 2022-12-02 VITALS
BODY MASS INDEX: 33.49 KG/M2 | HEART RATE: 59 BPM | DIASTOLIC BLOOD PRESSURE: 79 MMHG | WEIGHT: 189 LBS | HEIGHT: 63 IN | SYSTOLIC BLOOD PRESSURE: 128 MMHG

## 2022-12-02 DIAGNOSIS — E11.9 DIABETES MELLITUS TYPE 2 WITHOUT RETINOPATHY (HCC): Primary | ICD-10-CM

## 2022-12-02 DIAGNOSIS — E78.00 PURE HYPERCHOLESTEROLEMIA: ICD-10-CM

## 2022-12-02 DIAGNOSIS — E61.1 LOW IRON: ICD-10-CM

## 2022-12-02 PROCEDURE — 3008F BODY MASS INDEX DOCD: CPT | Performed by: INTERNAL MEDICINE

## 2022-12-02 PROCEDURE — 3078F DIAST BP <80 MM HG: CPT | Performed by: INTERNAL MEDICINE

## 2022-12-02 PROCEDURE — 3074F SYST BP LT 130 MM HG: CPT | Performed by: INTERNAL MEDICINE

## 2022-12-02 PROCEDURE — 99214 OFFICE O/P EST MOD 30 MIN: CPT | Performed by: INTERNAL MEDICINE

## 2022-12-02 RX ORDER — FERROUS GLUCONATE 324(37.5)
1 TABLET ORAL DAILY
Qty: 90 TABLET | Refills: 0 | Status: SHIPPED | OUTPATIENT
Start: 2022-12-02

## 2022-12-02 RX ORDER — CIPROFLOXACIN 500 MG/1
500 TABLET, FILM COATED ORAL 2 TIMES DAILY
Qty: 14 TABLET | Refills: 0 | Status: SHIPPED | OUTPATIENT
Start: 2022-12-02

## 2022-12-09 ENCOUNTER — TELEPHONE (OUTPATIENT)
Dept: INTERNAL MEDICINE CLINIC | Facility: CLINIC | Age: 57
End: 2022-12-09

## 2022-12-09 NOTE — TELEPHONE ENCOUNTER
Left detailed message (okay per HIPAA), detailed mychart sent as well.      Future Appointments   Date Time Provider Ari Douglas   1/3/2023  9:00 AM Dennis Mott MD [facility] [place]   6/29/2023  3:30 PM Jonatan Lawrence MD [address] [facility]   12/6/2023  3:00 PM Josi Butler MD [facility]

## 2022-12-09 NOTE — TELEPHONE ENCOUNTER
Very good reading of blood sugars -continue with kelton management medications and diet .     F/u  Visit   1  Month - As  Discussed  At visit time

## 2022-12-09 NOTE — TELEPHONE ENCOUNTER
Pt states she was to call back with blood sugar readings to update Dr Neal Sanchez      Per pt BS levels  This week    FBS running between . Mostly in 120's    Evenings running between 103-118    Pt states she is taking metformin 1000 mg BID and Jardiance 10 mg daily. Pt has not taken glimepiride. Pt states BS seems lower when \"I take my daily walks and drink plenty of water\"      Please advise.

## 2023-01-15 NOTE — TELEPHONE ENCOUNTER
The patient did not view the [facility]. Left message to call back.  Transfer to triage stretcher

## 2023-01-16 NOTE — TELEPHONE ENCOUNTER
Refill passed per [place] [employer], [facility] protocol.      Requested Prescriptions   Pending Prescriptions Disp Refills    ROSUVASTATIN 5 MG Oral Tab [Pharmacy Med Name: ROSUVASTATIN 5MG TABLETS] 90 tablet 1     Sig: TAKE 1 TABLET(5 MG) BY MOUTH EVERY NIGHT       Cholesterol Medication Protocol Passed - 1/15/2023  5:55 AM        Passed - ALT in past 12 months        Passed - LDL in past 12 months        Passed - Last ALT < 80     Lab Results   Component Value Date    ALT 19 01/03/2023             Passed - Last LDL < 130     Lab Results   Component Value Date    LDL 90 11/29/2022             Passed - In person appointment or virtual visit in the past 12 mos or appointment in next 3 mos     Recent Outpatient Visits              1 week ago Type 2 diabetes mellitus without complication, without long-term current use of insulin ([facility]    Ruben Temple MD    Office Visit    1 month ago Diabetes mellitus type 2 without retinopathy ([facility] [address]    Cori Spurr, [place] Beatrice Canard, MD    Office Visit    1 month ago Mucinous neoplasm of pancreas    Tamika Forrest MD    Office Visit    4 months ago Pure hypercholesterolemia    Dorinda Ellis, [address], [place] Marie Murray MD    Office Visit    6 months ago Right lumbar radiculopathy    [facility], [address], [facility],     Telemedicine          Future Appointments         Provider Department Appt Notes    In 1 month MD Dorinda Peña, [address], [place] follow up     In 5 months MD Dorinda Darden, [address], [address] DM EE    In 10 months MD Dorinda Sterling, [facility], [address] annual                     Future Appointments         Provider Department Appt Notes    In 1 month Giovani Lowe MD [address] follow up     In 5 months Tony Thomas MD [address] [place] [address] DM EE    In 10 months Almita Torres MD [address] annual             Recent Outpatient Visits              1 week ago Type 2 diabetes mellitus without complication, without long-term current use of insulin ([facility] [address]    [address] [facility], [place] Claven Mink, MD    Office Visit    1 month ago Diabetes mellitus type 2 without retinopathy ([facility] [address]    [address] [place] [address], [place] Claven Mink, MD    Office Visit    1 month ago Mucinous neoplasm of pancreas    Zuleika Santana MD    Office Visit    4 months ago Pure hypercholesterolemia    [facility] [place] Claven Mink, MD    Office Visit    6 months ago Right lumbar radiculopathy    [facility], [address] [facility],[address], [place], [place]    Telemedicine

## 2023-02-09 NOTE — TELEPHONE ENCOUNTER
Refill passed per [place] protocol. Per office visit notes, patient stable on metoprolol tartrate 25mg: half tab daily    Requested Prescriptions   Pending Prescriptions Disp Refills    metoprolol tartrate 25 MG Oral Tab 45 tablet 1     Sig: Take 0.5 tablets (12.5 mg total) by mouth daily.        Hypertensive Medications Protocol Passed - 2/8/2023  4:28 PM        Passed - In person appointment in the past 12 or next 3 months     Recent Outpatient Visits              1 month ago Type 2 diabetes mellitus without complication, without long-term current use of insulin ([facility] [address]    Nesha Chandler, [facility], Milvia Grubbs MD    Office Visit    2 months ago Diabetes mellitus type 2 without retinopathy ([facility] [address]    Nesha Chandler, [address] [place] [address], Milvia Grubbs MD    Office Visit    2 months ago Mucinous neoplasm of pancreas    Jarad Marion MD    Office Visit    5 months ago Pure hypercholesterolemia    [facility] [place] Merna Talavera [facility]    Office Visit    7 months ago Right lumbar radiculopathy    [facility], [address], [place], Daylene Koyanagi,     Telemedicine          Future Appointments         Provider Department Appt Notes    In 4 months MD Nesha Ley, [address], [employer] EE    In 10 months MD Nesha Rsoenbaum, [address] [facility] annual               Passed - Last BP reading less than 140/90     BP Readings from Last 1 Encounters:  01/03/23 : 120/77              Passed - CMP or BMP in past 6 months     Recent Results (from the past 4392 hour(s))   COMP METABOLIC PANEL (14)    Collection Time: 01/03/23  9:56 AM   Result Value Ref Range    Glucose 130 (H) 70 - 99 mg/dL    Sodium 140 136 - 145 mmol/L    Potassium 4.3 3.5 - 5.1 mmol/L    Chloride 105 98 - 112 mmol/L CO2 28.0 21.0 - 32.0 mmol/L    Anion Gap 7 0 - 18 mmol/L    BUN 28 (H) 7 - 18 mg/dL    Creatinine 0.92 0.55 - 1.02 mg/dL    BUN/CREA Ratio 30.4 (H) 10.0 - 20.0    Calcium, Total 9.6 8.5 - 10.1 mg/dL    Calculated Osmolality 297 (H) 275 - 295 mOsm/kg    eGFR-Cr 74 >=60 mL/min/1.73m2    ALT 19 13 - 56 U/L    AST 15 15 - 37 U/L    Alkaline Phosphatase 73 41 - 108 U/L    Bilirubin, Total 1.0 0.1 - 2.0 mg/dL    Total Protein 8.1 6.4 - 8.2 g/dL    Albumin 4.2 3.4 - 5.0 g/dL    Globulin  3.9 2.8 - 4.4 g/dL    A/G Ratio 1.1 1.0 - 2.0    Patient Fasting for CMP? Yes      *Note: Due to a large number of results and/or encounters for the requested time period, some results have not been displayed. A complete set of results can be found in Results Review.                Passed - In person appointment or virtual visit in the past 6 months     Recent Outpatient Visits              1 month ago Type 2 diabetes mellitus without complication, without long-term current use of insulin ([address]    Brittney Krause MD    Office Visit    2 months ago Diabetes mellitus type 2 without retinopathy ([facility] [address]    Curlee Glow, [place] Dennice Dukes, MD    Office Visit    2 months ago Mucinous neoplasm of pancreas    Dariel Vaughn MD    Office Visit    5 months ago Pure hypercholesterolemia    [address], [facility], [place] Dennice Dukes, MD    Office Visit    7 months ago Right lumbar radiculopathy    [address], [address], [place]    Telemedicine          Future Appointments         Provider Department Appt Notes    In 4 months Vani Samayoa MD [address], [address], [facility] EE    In 10 months Kya De Oliveira MD [address], [address] [facility], [facility] or GFRNAA > 50     GFR Evaluation  EGFRCR: 74 , resulted on 1/3/2023                Recent Outpatient Visits              1 month ago Type 2 diabetes mellitus without complication, without long-term current use of insulin ([facility])    [address] [facility], Claudine Delgado MD    Office Visit    2 months ago Diabetes mellitus type 2 without retinopathy ([facility] [address]    [address], [address] [place], [place] [address] Roslyn Romo MD    Office Visit    2 months ago Mucinous neoplasm of pancreas    Demond Romero MD    Office Visit    5 months ago Pure hypercholesterolemia    [address], [facility]    Office Visit    7 months ago Right lumbar radiculopathy    [address], [address], [facility],     Telemedicine            Future Appointments         Provider Department Appt Notes    In 4 months Cuco Taylor MD [address], [address], [facility] EE    In 10 months Kathy Torres MD [address], [address] [facility], [facility]

## 2023-02-09 NOTE — TELEPHONE ENCOUNTER
Tried calling Pt.  Left message to call back and also check [employer] message regarding medication request.

## 2023-04-25 NOTE — PROGRESS NOTES
[address] [facility]  NEW PATIENT EVALUATION    Consultation as a request of Dr. Oma Francisco    Chief Complaint: back pain.     HISTORY OF PRESENT ILLNESS:   Patient presents with:  Back Pain: New right handed patient endorsing some numbness in the right hand in the middle finger just at the tip of the finger. She has had x-ray imaging of the lumbar spine as noted below which is notable for spondylolisthesis at L5-S1.   She has not had any further imaging or specific tr SOCIAL HISTORY:   Social History    Tobacco Use      Smoking status: Never Smoker      Smokeless tobacco: Never Used    Vaping Use      Vaping Use: Never used    Alcohol use: Not Currently      Comment: rare    Drug use: Never         REVIEW OF SYSTE appropriate    Gait small steppage gait  Posture: Dextroscoliotic posture noted    Musculoskeletal/Neurological Exam:    LUMBAR SPINE:  Inspection: no erythema, swelling, or obvious deformity.   Their iliac crest and shoulder heights are symmetrical.     Pa spine which is notable for grade 2 anterolisthesis of L5 relative to S1 with bilateral L5 pars fracture. She has hypertrophic facet joints in the lower lumbar spine and dextroscoliotic curve.       All imaging results were reviewed and discussed with joesph [facility] [facility]

## 2023-04-27 NOTE — TELEPHONE ENCOUNTER
Refill passed per [place] protocol.       Requested Prescriptions   Pending Prescriptions Disp Refills    ROSUVASTATIN 5 MG Oral Tab [Pharmacy Med Name: ROSUVASTATIN 5MG TABLETS] 90 tablet 1     Sig: TAKE 1 TABLET(5 MG) BY MOUTH EVERY NIGHT       Cholesterol Medication Protocol Passed - 4/26/2023  2:51 PM        Passed - ALT in past 12 months        Passed - LDL in past 12 months        Passed - Last ALT < 80     Lab Results   Component Value Date    ALT 19 01/03/2023             Passed - Last LDL < 130     Lab Results   Component Value Date    LDL 90 11/29/2022               Passed - In person appointment or virtual visit in the past 12 mos or appointment in next 3 mos     Recent Outpatient Visits              3 months ago Type 2 diabetes mellitus without complication, without long-term current use of insulin ([facility] [address]    Melany Hawkins MD    Office Visit    4 months ago Diabetes mellitus type 2 without retinopathy ([facility] [address]    Sunnie Sauers, [place] Norbert Drop, MD    Office Visit    4 months ago Mucinous neoplasm of pancreas    Alyx Womack MD    Office Visit    8 months ago Pure hypercholesterolemia    [address], [facility], [place] Norbert Drop, MD    Office Visit    10 months ago Right lumbar radiculopathy    [facility], [address],[facility] [facility],     Telemedicine          Future Appointments         Provider Department Appt Notes    In 2 months Ana Luisa Michael MD [address], [address],[facility] [facility] EE    In 7 months Jenny White MD [address], [address] [facility] annual                     Future Appointments         Provider Department Appt Notes    In 2 months Ana Luisa Michael MD [address], [address] [facility] DM EE    In 7 months Medhat Jacob MD [address], [address] [facility] annual            Recent Outpatient Visits              3 months ago Type 2 diabetes mellitus without complication, without long-term current use of insulin ([facility]    [address] [facility], Poornima Ulloa MD    Office Visit    4 months ago Diabetes mellitus type 2 without retinopathy ([address]    [address] [facility], [place] Martha Goes, MD    Office Visit    4 months ago Mucinous neoplasm of pancreas    Yesika Rebolledo MD    Office Visit    8 months ago Pure hypercholesterolemia    [address], [facility], [place] Yoana Fulton MD    Office Visit    10 months ago Right lumbar radiculopathy    [facility], [address], [place], [place]    Telemedicine

## 2023-08-23 NOTE — TELEPHONE ENCOUNTER
Refill Request    Medication request: GABAPENTIN 600 MG Oral Tab. TAKE 1 TABLET(600 MG) BY MOUTH THREE TIMES DAILY      LOV: 06/01/2022 (Televisit on 06/24/2022) with Dr Margo Hurtado  Due back to clinic per last office note:   Follow up in 6 months  NOV: Visit date not found      [place]: 07/24/2023 #90 tab (30 days)    Urine drug screen (if applicable): N/A  Pain contract: N/A    LOV plan (if weaning or changing medications): Per Dr Mendoza Rios will continue gabapentin 600 mg 3 times daily as well as home exercise program and follow-up with me in 6 months \"

## 2023-09-25 NOTE — TELEPHONE ENCOUNTER
metFORMIN HCl 1000 MG Oral Tab, Take 1 tablet (1,000 mg total) by mouth 2 (two) times daily with meals. , Disp: 180 tablet, Rfl: 2

## 2023-09-26 NOTE — TELEPHONE ENCOUNTER
Please review; protocol failed/ no protocol  Medication pended for your review and approval.    Requested Prescriptions   Pending Prescriptions Disp Refills    metFORMIN HCl 1000 MG Oral Tab 180 tablet 2     Sig: Take 1 tablet (1,000 mg total) by mouth 2 (two) times daily with meals.        Diabetes Medication Protocol Failed - 9/25/2023 11:28 AM        Failed - Last A1C < 7.5 and within past 6 months     Lab Results   Component Value Date    A1C 12.2 (H) 11/29/2022             Failed - In person appointment or virtual visit in the past 6 mos or appointment in next 3 mos     Recent Outpatient Visits              8 months ago Type 2 diabetes mellitus without complication, without long-term current use of insulin ([facility] [address]    Linda Kim, Marian Tapia MD    Office Visit    9 months ago Diabetes mellitus type 2 without retinopathy ([facility] [address]    Linda Kim, [place] Tereasa Crochet, MD    Office Visit    10 months ago Mucinous neoplasm of pancreas    [address], Joesph Rosa MD    Office Visit    1 year ago Pure hypercholesterolemia    [address], [facility], [place] Tereasa Crochet, MD    Office Visit    1 year ago Right lumbar radiculopathy    [facility], [address], [place], Jered Mckeon, DO    Telemedicine          Future Appointments         Provider Department Appt Notes    In 1 month Farida Delaney MD [address], [address], [place] DM EE    In 2 months Jaci Siu MD [address], [facility] to confirm  Annual                    Passed - [facility] or GFRNAA > 50     GFR Evaluation  EGFRCR: 74 , resulted on 1/3/2023          Passed - GFR in the past 12 months

## 2023-10-03 NOTE — TELEPHONE ENCOUNTER
Refill Request    Medication request: gabapentin 600 MG Oral Tab. Take 1 tablet (600 mg total) by mouth 3 (three) times daily. [facility] 06/[facility]/22 ([facility] with Zofia Thompson D.O. Due back to clinic per last office note: Per Dr. Arthur Mata: 6 Months  NOV: Visit date not found      [place]: 08/23/2023 #270 - 90 day supply  Earliest fill date: 11/21/2023. Too soon to be filled - also patient needs f/u appointment. CLARED message sent to patient. Urine drug screen (if applicable): n/a  Pain contract: n/a    LOV plan (if weaning or changing medications): Per Dr. Arthur Mata: \" She will continue gabapentin 600 mg 3 times daily. \"

## 2023-10-10 NOTE — TELEPHONE ENCOUNTER
LMTCB.  When pt calls back pls ask patient if she is able to come to her office visit at an earlier time today. Can patient come in at 11:40am, 12:20PM (during our lunch break), or at [facility]?? Pt is due for blood work to as well that was ordered on 10/24/21. Thalidomide Counseling: I discussed with the patient the risks of thalidomide including but not limited to birth defects, anxiety, weakness, chest pain, dizziness, cough and severe allergy.

## 2023-12-14 NOTE — TELEPHONE ENCOUNTER
Please review; Protocol Failed/ no protocol. Rx Pended, authorize if appropriate  No future appointments.     Requested Prescriptions   Pending Prescriptions Disp Refills    METFORMIN HCL 1000 MG Oral Tab [Pharmacy Med Name: METFORMIN 1000MG TABLETS] 180 tablet 0     Sig: TAKE 1 TABLET(1000 MG) BY MOUTH TWICE DAILY WITH MEALS       Diabetes Medication Protocol Failed - 12/13/2023  7:51 PM        Failed - Last A1C < 7.5 and within past 6 months     Lab Results   Component Value Date    A1C 12.2 (H) 11/29/2022             Failed - In person appointment or virtual visit in the past 6 mos or appointment in next 3 mos     Recent Outpatient Visits              11 months ago Type 2 diabetes mellitus without complication, without long-term current use of insulin ([address]    Bruce Clayton MD    Office Visit    1 year ago Diabetes mellitus type 2 without retinopathy ([address]    Howie Llanos, [place] Robertha Salon, MD    Office Visit    1 year ago Mucinous neoplasm of pancreas    Cayla Ratliff MD    Office Visit    1 year ago Pure hypercholesterolemia    Bruce Clayton MD    Office Visit    1 year ago Right lumbar radiculopathy    [facility], [address], [place], [place], DO    Telemedicine                      Passed - [facility] or GFRNAA > 50     GFR Evaluation  EGFRCR: 74 , resulted on 1/3/2023          Passed - GFR in the past 12 months

## 2023-12-14 NOTE — TELEPHONE ENCOUNTER
Called patient regarding over due imaging MRI MRCP and follow-up appointment needed. No answer, LVM. Awaiting call back.

## 2023-12-15 NOTE — TELEPHONE ENCOUNTER
Patient called and is requesting a medication refill follow up appointment, patient will be moving to [place] Jan 1, Would like to speak to doctor before she moves. Patient is has open availability and open for video visit.

## 2023-12-16 NOTE — TELEPHONE ENCOUNTER
Patient was scheduled for video visit. Patient states she is leaving for the holidays 12/20 out of town and would like to know if Dr. Paschal Hatchet can refill her GABAPENTIN as well. Patient was advised that medication is prescribed by Dr. Arthur Mata. Patient is no longer seeing him since she will be moving and would like to know if Dr. Paschal Hatchet can refill the medication. Please advise.

## 2023-12-18 NOTE — TELEPHONE ENCOUNTER
Refill Request    Medication request: gabapentin 600 MG Oral Tab. Take 1 tablet (600 mg total) by mouth 3 (three) times daily. LOV: 06/24/2022 (TELEVISIT) with Johana Wang D.O. Due back to clinic per last office note:  Per Dr. Dandy Win: 6 Months   NOV: Visit date not found   [employer] message sent during last refill request (see refill encounter 10/02/2023)     [place]: 08/23/2023 #270 [place]    Urine drug screen (if applicable): n/a  Pain contract: n/a    LOV plan (if weaning or changing medications): Per Dr. Dandy Win: Li Speaks will continue gabapentin 600 mg 3 times daily. \"      Per refill request on 12/15/2023 with Dr. Han March: \"Patient was advised that medication is prescribed by Dr. Dandy Win. Patient is no longer seeing him since she will be moving and would like to know if Dr. Han March can refill the medication. Please advise. \" Gabapentin is currently pending Dr. Kayla Wilburn signature. - due to Gabapentin pending with Dr. Han March at this time & patient  stating she is no longer seeing Dr. Dandy Win will remove this refill request at this time.

## 2023-12-18 NOTE — TELEPHONE ENCOUNTER
Duplicate request - see TE 12/16/2023 for details. Gabapentin is currently pending Dr. Pool Whittaker signature.

## 2023-12-18 NOTE — TELEPHONE ENCOUNTER
Spoke to patient regarding prior message. Patient states will call and schedule MRI MRCP, reports forgetting. Central Scheduling number given. Advised to call back to schedule with Dr. Torres for appointment after MRI MRCP completed. All questions answered, advised to call back for any future questions or concerns. Patient verbalized understanding.

## 2023-12-22 NOTE — PROGRESS NOTES
Telemedicine Note    Virtual Video Check-In    Janelle Wade verbally consents to a Virtual Video Check-In service on 12/22/23. Patient understands and accepts financial responsibility for any deductible, co-insurance and/or co-pays associated with this service. This visit is conducted using Telemedicine with live, interactive video and audio. I spoke with Janelle Wade by secure video chat, verified date of birth, and discussed their current concerns:       HPI : Patient states she is moving  1 month to  [place] patient states she is looking to be closer to her family as well as will have the nicer weather     Patient states she needs refills on her gabapentin she was trying to get the refills from Dr. Shante Skinner who originally prescribed but was denied patient was not seen by the doctor for a long time as well as myself for over a year. Patient states she is not taking her medications except for gabapentin and metformin   She is not checking her blood pressure either.     state need refills on gabapentin  taking 300 mg 1  in am 1tab and 1/2 tab in PM     Patient states feeling well otherwise. Denies chest pain, shortnesss of breath, palpitations, or abdominal pain, denies UTI symptoms fever or chills. There is no height or weight on file to calculate BMI. Past Medical History:   Diagnosis Date    Arrhythmia     tachycardia post covid     COVID-19 10/23/2020    Diabetes [facility])     Pancreatic mass      Past Surgical History:   Procedure Laterality Date    Pancreatectomy,distal+preserv duod  08/30/2021    Robotic assisted converted to open distal pancreatectomy, en bloc partial gastrectomy and splenectomy;  Takedown of splenic flexure     Family History   Problem Relation Age of Onset    Diabetes Father     Cancer Father         retroperitoneal sarcoma    Diabetes Maternal Grandmother     Heart Disorder Maternal Grandfather     Hypertension Paternal Grandmother     Heart Disorder Paternal Grandmother     Diabetes Paternal Grandfather     Glaucoma Neg     Macular degeneration Neg       Patient Active Problem List   Diagnosis    Spondylolisthesis at L5-S1 level    Bilateral lumbar radiculopathy    Weakness of both lower extremities    Critical illness polyneuropathy (HCC)    Pancreatic mass    Splenic vein thrombosis    Abnormal magnetic resonance cholangiopancreatography (MRCP)    Hx of sinus tachycardia    Cystic mass of pancreas    Preoperative testing    Mucinous neoplasm of pancreas    Pure hypercholesterolemia    Diabetes mellitus type 2 without retinopathy ([facility] [address]    Age-related nuclear cataract of both eyes    Hyperpigmentation of right eyelid    Hyperglycemia    Type 2 diabetes mellitus without complication, without long-term current use of insulin (HCC)    Acute respiratory failure with hypoxia (HCC)    Abnormal LFTs     Current Outpatient Medications   Medication Sig Dispense Refill    Gabapentin, Once-Daily, 300 MG Oral Tab Take  1 tab  300 mg  in am  and 1/2 = 150  mg   tab in pm 45 tablet 0    metFORMIN HCl 1000 MG Oral Tab Take 1 tablet (1,000 mg total) by mouth 2 (two) times daily with meals. 180 tablet 0    metoprolol tartrate 25 MG Oral Tab Take 0.5 tablets (12.5 mg total) by mouth daily. 45 tablet 1    rosuvastatin 5 MG Oral Tab Take 1 tablet (5 mg total) by mouth nightly. 90 tablet 3    omeprazole 20 MG Oral Capsule Delayed Release TAKE ONE CAPSULE BY MOUTH EVERY DAY FOR ACID REFLUX AND HISTORY OF BLEEDING FROM GI TRACT 90 capsule 1    empagliflozin (JARDIANCE) 10 MG Oral Tab Take 1 tablet (10 mg total) by mouth daily. 90 tablet 0    Ferrous Gluconate 324 (37.5 Fe) MG Oral Tab Take 1 tablet (324 mg total) by mouth daily. 90 tablet 0    acetaminophen 500 MG Oral Tab Take 2 tablets (1,000 mg total) by mouth every 6 (six) hours as needed.  60 tablet 2    Microlet Lancets Does not apply Misc Check sugars 2 times  Daily and as needed (Patient taking differently: Check sugars 2 times  Daily and as needed) 200 each 1    CONTOUR NEXT TEST In Vitro Strip Check sugars  Twice daily and as needed (Patient taking differently: Check sugars  Twice daily and as needed) 200 strip 1    Blood Glucose Monitoring Suppl (CONTOUR NEXT MONITOR) w/Device Does not apply Kit         No Known Allergies        ROS   GENERAL: feels well ,otherwise negative for fever or chills  LUNGS: negative for shortness of breath or cough ,no wheezing   CARDIOVASCULAR: negative for chest pain dyspnea on exertion or palpitations   GI: negative for abdominal pain,  nausea,vomiting     Physical Exam  Patient alert and oriented x3  Patient does not appear in any respiratory distress during the conversation  No labored breathing  Movement of upper extremities normal     Summary of topics discussed/ diagnosis:  Assessment and plan   1. Type 2 diabetes mellitus without complication, without long-term current use of insulin ([facility] [address]  Patient to have a blood test labs ordered  Keep with low carbohydrate and sugar diet  On metformin only   And diet   Labs to complete  Patient is due for diabetic foot eye exam needs to schedule appointment recommend patient to schedule appointment as soon as possible  So we can discuss regarding the sugars and for that plan of care  - CBC With Differential With Platelet; Future  - Lipid Panel; Future  - TSH W Reflex To Free T4; Future  - Comp Metabolic Panel (14); Future  - Hemoglobin A1C; Future  - Microalb/Creat Ratio, Random Urine; Future    2.  Pure hypercholesterolemia   Low  fat diet   Labs  to complete not taking medication   Patient not taking her medications rosuvastatin  Recommend to complete the blood test for the recommendation will follow    History of lower back and polyneuropathy  Patient seeing Dr. Judith Millan but he did not have appointment a long time  On  gabapentin -patient need refills we will provide her with shot refill request will be provided to pt until see Dr Judith Millan recommend patient to schedule appointment with Dr. Leslie Holder as soon as possible patient agrees with plan verbalized understanding compliance and agrees with short-  Term refill until she sees Dr. Leslie Holder      Not   checking vital signs  bp or  hr or sugars  State doing well not taking metoprolol   Again recommend patient to come back in office for annual physical diabetes and cholesterol  Management    Patient agrees with plan she will complete the blood test in few weeks and come back shortly after for a physical examination. In follow-up visit  No more refills can be given without the blood test    Will provide patient if had the blood test and all stable normal 90-day supply on her regular medications-feels she sees a doctor in [place] she moves        Follow Up:No follow-ups on file. Duration of service, in minutes:         Patient advised to follow CDC guidelines for self isolation and symptomatic treatment as outlined on CDC Patient Guidelines. Explained to patient rationale of phone triage and evaluation due to current COVID-19 outbreak based upon CDC recommendations, as well as limitations of video triage including but not limited to the inability to perform appropriate physical exam nor face-to-face examination, which may limit and/or reduce diagnostic accuracy. Austin Whittaker was informed and agrees that this telemedicine visit will charged. Austin Whittaker expresses understanding, accepts these limitations, and agrees to video evaluation as documented above. Austin Whittaker understands video evaluation is not a substitute for face-to-face examination or emergency care. Patient advised to go to ER or call 911 for worsening symptoms or acute distress.    Chente Mariscal MD

## 2023-12-22 NOTE — TELEPHONE ENCOUNTER
gabapentin 600 MG Oral Tab, Take 300 mg in the morning and 150 mg at night, Disp: 45 tablet, Rfl: 0        Message from pharmacy: You can not break 600  mg  in half or 3 quarters.  Please provide us correct strength an directions

## 2023-12-22 NOTE — TELEPHONE ENCOUNTER
[facility] in [place] called and is asking if medication below is for ER tablet or capsule.      Medication Quantity Refills Start End   Gabapentin, Once-Daily, 300 MG Oral Tab

## 2023-12-22 NOTE — TELEPHONE ENCOUNTER
Medication Quantity Refills Start End   gabapentin 600 MG Oral Tab 270 tablet      Patient was given tablets 600 mg tablets from Dr. Mitch Mcgarry     but she is now taking 300 mg in the morning and 150 mg in the evening    I believe this was just a regular gabapentin   we can also give her 600 mg she can  cut to half in the morning  = 300 mg qam and quarter 1/4  tab= 150 mg  in the evening

## 2023-12-22 NOTE — TELEPHONE ENCOUNTER
Medication Detail      Medication Quantity Refills Start End   Gabapentin, Once-Daily, 300 MG Oral Tab 45 tablet 0 12/22/2023 --   Sig:   Take  1 tab  300 mg  in am  and 1/2 = 150  mg   tab in pm     Route:   (none)     Order #:   [id number]         Spoke to Lisandra Donohue. She wants to know if the provider wants patient  to have extended release tablets. She said capsules are immediate release and tablets are extended release. It is not specified on Rx. Dr. Paschal Hatchet, please advise. Office note from 12/22/23:  Patient states she needs refills on her gabapentin she was trying to get the refills from Dr. Arthur Mata who originally prescribed but was denied patient was not seen by the doctor for a long time as well as myself for over a year.     Patient states she is not taking her medications except for gabapentin and metformin   She is not checking her blood pressure either.     state need refills on gabapentin  taking 300 mg 1  in am 1tab and 1/2 tab in PM

## 2023-12-23 NOTE — TELEPHONE ENCOUNTER
See also alternate encounter. Pharmacy contacted. 600 mg tablet is scored and can be broken in half. Her concern is breaking it further to achieve 150 mg, dose may not be accurate. Left message to call back to notify patient. Please clarify how she has been taking gabapentin, has she been successfully breaking her pills? Currently, 600 mg daily is sent.

## 2023-12-23 NOTE — TELEPHONE ENCOUNTER
See another encounter    Patient was given tablets 600 mg tablets from Dr. Jarrett File     but she is cutting down dosage - now taking 300 mg in the morning and 150 mg in the evening  Gabapentin 600 mg every day   Sent to pharmacy   pt can continue cutting down dosage and take same dosage 300 mg qam and 150 mg qpm      new refills need to be given from Dr Jarrett File.

## 2023-12-27 NOTE — TELEPHONE ENCOUNTER
Dr. Meño Brennan- correct gabapentin dosing was confirmed with the patient. Script pended (historical) to reflect accuracy. Please sign if ok.  Thank you      Spoke with patient and confirmed Gabapentin dosing  Patient states she takes 600 mg in the morning and 300 mg in the evening  She uses a pill cutter to split the scored tablet      **Medications updated to reflect accurate dosing**

## 2024-01-11 NOTE — TELEPHONE ENCOUNTER
Pt called to schedule Virtual Visit with MD for a refill for Gabapentin by 1/17/23 Pt. stated that she will be moving and needs script to cover until she gets established with new MD. Pt also stated the Pt's PCP informed her to reach out to prescribing Dr's office for refill.  Per MD's MA and RN staff, since Pt has not been seen in office for over a year Pt will need to be seen in office to confirm that Pt is still in need of prescription.   Since office could not accommodate Pt at either location (within a weeks time) ,this office recommends that she request refill from PCP's office.

## 2024-01-12 NOTE — TELEPHONE ENCOUNTER
Refill passed per [facility] protocol.    Requested Prescriptions   Pending Prescriptions Disp Refills    gabapentin 600 MG Oral Tab 45 tablet 0     Sig: Take 1 tablet (600 mg) by mouth in the morning and take 0.5 tablet (300 mg) by mouth in the evening       Neurology Medications Passed - 1/11/2024 11:57 AM        Passed - In person appointment or virtual visit in the past 6 mos or appointment in next 3 mos     Recent Outpatient Visits              3 weeks ago Type 2 diabetes mellitus without complication, without long-term current use of insulin ([facility])    [facility], [place] Vukomanovic, Emela, MD    Telemedicine    1 year ago Type 2 diabetes mellitus without complication, without long-term current use of insulin ([facility])    [facility] [place] Vukomanovic, Emela, MD    Office Visit    1 year ago Diabetes mellitus type 2 without retinopathy ([facility])    [facility] [place] Vukomanovic, Emela, MD    Office Visit    1 year ago Mucinous neoplasm of pancreas    [facility] Bahman Torres MD    Office Visit    1 year ago Pure hypercholesterolemia    [facility] [place] Vukomanovic, Emela, MD    Office Visit          Future Appointments         Provider Department Appt Notes    In 4 days Bah[facility] Torres MD [facility] 1yr f-up review 1/8 MRI MRCP  Sx: 8/30/2021 Robot converted to open distal pancreatectomy, en bloc partial gastrectomy and splenectomy Takedown of splenic flexure  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 [place] Vukomanovic, Emela, MD    Telemedicine    1 year ago Type 2 diabetes mellitus without complication, without long-term current use of insulin ([facility])    [facility] [place] Vukomanovic, Emela, MD    Office Visit    1 year ago Diabetes mellitus type 2 without retinopathy ([facility])    [facility] [place] Vukomanovic, Emela, MD    Office Visit    1 year ago Mucinous neoplasm of pancreas    [facility] [place] Bahman Torres MD    Office Visit    1 year ago Pure hypercholesterolemia    [facility] [place] Vukomanovic, Emela, MD    Office Visit

## 2024-01-15 NOTE — PROGRESS NOTES
[facility]    Patient Name:  Queta Johnson   YOB: 1965   Gender:  Female   Appt Date:  1/16/2024   Provider:  Bahman Torres MD   Insurance:  [facility] PPO     PATIENT PROVIDERS  Referring Provider: Dr. Rea    Primary Care Provider:Dianna Farah MD   Address: [address] [place] [address]   Phone #: [phone]       CHIEF COMPLAINT  No chief complaint on file.       PROBLEMS  Reviewed   Patient Active Problem List   Diagnosis    Spondylolisthesis at L5-S1 level    Bilateral lumbar radiculopathy    Weakness of both lower extremities    Critical illness polyneuropathy (HCC)    Pancreatic mass    Splenic vein thrombosis    Abnormal magnetic resonance cholangiopancreatography (MRCP)    Hx of sinus tachycardia    Cystic mass of pancreas    Preoperative testing    Mucinous neoplasm of pancreas    Pure hypercholesterolemia    Diabetes mellitus type 2 without retinopathy (HCC)    Age-related nuclear cataract of both eyes    Hyperpigmentation of right eyelid    Hyperglycemia    Type 2 diabetes mellitus without complication, without long-term current use of insulin (HCC)    Acute respiratory failure with hypoxia (HCC)    Abnormal LFTs        History of Present Illness:  Queta Johnson presents for a follow up  This is a 58 year old year old Female with PMH severe COVID-19 October 2020 requiring ventilation support, tracheostomy, prolonged rehab in December now with interstitial lung disease, T2DM, who was incidentally found to have large pancreatic tail mass. Fluid analysis of cyst consistent with a large MCN.  She was taken to the OR on 8/30/2021 for a robotic-assisted converted to open distal pancreatectomy, en bloc partial gastrectomy, splenectomy, takedown of splenic flexure. 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 Margins negative. Final stage pTis, N0.    She  presents today for a follow up. Most recent MRI/MRCP without residual or recurrent malignancy noted     Vital Signs:  There were no vitals taken for this visit.     Medications Reviewed:    Current Outpatient Medications:     gabapentin 600 MG Oral Tab, Take 1 tablet (600 mg total) by mouth every morning AND 0.5 tablets (300 mg total) every evening., Disp: 45 tablet, Rfl: 1    metFORMIN HCl 1000 MG Oral Tab, Take 1 tablet (1,000 mg total) by mouth 2 (two) times daily with meals., Disp: 180 tablet, Rfl: 0    metoprolol tartrate 25 MG Oral Tab, Take 0.5 tablets (12.5 mg total) by mouth daily., Disp: 45 tablet, Rfl: 1    rosuvastatin 5 MG Oral Tab, Take 1 tablet (5 mg total) by mouth nightly., Disp: 90 tablet, Rfl: 3    omeprazole 20 MG Oral Capsule Delayed Release, TAKE ONE CAPSULE BY MOUTH EVERY DAY FOR ACID REFLUX AND HISTORY OF BLEEDING FROM GI TRACT, Disp: 90 capsule, Rfl: 1    empagliflozin (JARDIANCE) 10 MG Oral Tab, Take 1 tablet (10 mg total) by mouth daily., Disp: 90 tablet, Rfl: 0    Ferrous Gluconate 324 (37.5 Fe) MG Oral Tab, Take 1 tablet (324 mg total) by mouth daily., Disp: 90 tablet, Rfl: 0    acetaminophen 500 MG Oral Tab, Take 2 tablets (1,000 mg total) by mouth every 6 (six) hours as needed., Disp: 60 tablet, Rfl: 2    Microlet Lancets Does not apply Misc, Check sugars 2 times  Daily and as needed (Patient taking differently: Check sugars 2 times  Daily and as needed), Disp: 200 each, Rfl: 1    CONTOUR NEXT TEST In Vitro Strip, Check sugars  Twice daily and as needed (Patient taking differently: Check sugars  Twice daily and as needed), Disp: 200 strip, Rfl: 1    Blood Glucose Monitoring Suppl (CONTOUR NEXT MONITOR) w/Device Does not apply Kit,  , Disp: , Rfl:      Allergies Reviewed:  No Known Allergies     History:  Reviewed:  Past Medical History:   Diagnosis Date    Arrhythmia     tachycardia post covid     COVID-19 10/23/2020    Diabetes (HCC)     Pancreatic mass       Reviewed:  Past Surgical  History:   Procedure Laterality Date    PANCREATECTOMY,DISTAL+PRESERV DUOD  08/30/2021    Robotic assisted converted to open distal pancreatectomy, en bloc partial gastrectomy and splenectomy; Takedown of splenic flexure      Reviewed Social History:  Social History     Socioeconomic History    Marital status:    Tobacco Use    Smoking status: Never    Smokeless tobacco: Never   Vaping Use    Vaping Use: Never used   Substance and Sexual Activity    Alcohol use: Yes     Comment: rare    Drug use: Never   Other Topics Concern    Caffeine Concern Yes     Comment: 3 cups weekly    Exercise No      Reviewed:  Family History   Problem Relation Age of Onset    Diabetes Father     Cancer Father         retroperitoneal sarcoma    Diabetes Maternal Grandmother     Heart Disorder Maternal Grandfather     Hypertension Paternal Grandmother     Heart Disorder Paternal Grandmother     Diabetes Paternal Grandfather     Glaucoma Neg     Macular degeneration Neg         Review of Systems:  Review of Systems   Constitutional:  Negative for activity change, appetite change, chills, fatigue, fever and unexpected weight change.   Respiratory:  Negative for cough, chest tightness and shortness of breath.    Cardiovascular:  Negative for chest pain and leg swelling.   Gastrointestinal:  Negative for abdominal distention, abdominal pain, diarrhea and nausea.   Endocrine: Negative for polydipsia and polyuria.   Genitourinary:  Negative for difficulty urinating and dysuria.   Musculoskeletal:  Negative for myalgias.   Skin:  Negative for color change.   Allergic/Immunologic: Negative for immunocompromised state.   Neurological:  Negative for syncope and weakness.   Hematological:  Does not bruise/bleed easily.   Psychiatric/Behavioral:  Negative for behavioral problems.         Physical Examination:  Physical Exam  Constitutional:       Appearance: She is well-developed.   HENT:      Head: Normocephalic.   Eyes:      Pupils: Pupils  are equal, round, and reactive to light.   Abdominal:      General: There is no distension.      Palpations: Abdomen is soft.      Tenderness: There is no abdominal tenderness.      Hernia: No hernia is present.   Musculoskeletal:      Cervical back: Normal range of motion.   Skin:     General: Skin is warm and dry.   Neurological:      Mental Status: She is alert and oriented to person, place, and time.        Document Review:  MRI/MRCP - 1/8/2024  MRCP FINDINGS:    GALLBLADDER:   Gallbladder appears to be collapsed without significant fluid in its lumen.  BILE DUCTS:   Mild intrahepatic biliary ductal dilatation.  Common bile duct is enlarged measuring 9. This appearance is similar to prior exam  PANCREAS:   No divisum or pancreatic ductal dilatation.  Post distal pancreatectomy at the level of the pancreatic body.  Three-4 mm diameter T2 bright/cystic foci along the pancreatectomy margin which in retrospect are stable since prior exam.    Otherwise, no no suspicious cystic lesion or enhancing focus.  Pancreas has normal intrinsic T1 signal without enhancing lesion, ductal dilatation, or evidence of acute pancreatitis     OTHER FINDINGS:  LIVER: Liver is enlarged measuring 20.7 cm in length.  Diffuse signal loss on opposed phase imaging compatible with fatty infiltration.  Stable mild intrahepatic biliary ductal dilatation.  No suspicious enhancing hepatic lesion.  Liver has scattered  subcentimeter T2 bright foci which are too small to characterize but likely represent cysts.  SPLEEN: Post splenectomy.  ADRENALS: No nodule or enlargement.  KIDNEYS:   Scattered cortical and peripelvic T2 bright foci compatible with cysts.  However, a few of these foci are too small to definitively characterize.  OTHER: No abdominal lymphadenopathy or ascites.               Impression   CONCLUSION:  1.  There is history of a mucinous neoplasm of the pancreas, post distal pancreatectomy, splenectomy, and partial gastrectomy.   Expected postprocedural changes are redemonstrated.  Along the pancreatic resection margin there are 3-4 mm diameter T2 bright   foci likely representing small pseudocysts, dilated side branches , or cystic neoplasm such as IPMN.  In retrospect these findings are grossly stable since prior MRI exam.  2.  Renal and hepatic cysts however several these foci are too small to definitively characterize.  3.  Hepatomegaly.  Hepatic steatosis.  4.  Stable mild intrahepatic biliary ductal dilatation and common bile duct dilatation, gallbladder appears to be collapsed.          Assessment / Plan:  56 year old year old Female with PMH severe COVID-19 October 2020 requiring ventilation support, tracheostomy, prolonged rehab in December now with ILD, T2DM who is presented with large pancreatic tail cyst, fluid analysis consistent with a mucinous cyst.  S/p distal pancreatectomy, en bloc partial gastrectomy, splenectomy 8/30/2021. 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 Margins negative. 27 lymph nodes negative. Final stage pTis, N0    MRI/MRCP with ARVIND.   Recommend repeat MRI in 1 year  Patient will reestablish care once she moves to [place], provided her with information for surgeons at [facility].  She may follow-up with me on an as-needed basis.      Bahman Torres MD  [facility] Surgical Oncology  [facility]  Pager [phone]  [email or web address]        Follow Up:  No follow-ups on file.

## 2024-02-15 NOTE — PROCEDURES
The office order for PCP removal request is Approved and finalized on February 15, 2024.    Thanks,  [facility] Team

## 2024-02-15 NOTE — TELEPHONE ENCOUNTER
Duplicate request, previously addressed.     Disp Refills Start End    gabapentin 600 MG Oral Tab 45 tablet 1 1/12/2024 --    Sig - Route: Take 1 tablet (600 mg total) by mouth every morning AND 0.5 tablets (300 mg total) every evening. - Oral    Sent to pharmacy as: Gabapentin 600 MG Oral Tablet (Neurontin)    Notes to Pharmacy: Please note dose adjustment- confirmed with patient 12/27/23    E-Prescribing Status: Receipt confirmed by pharmacy (1/12/2024 10:32 AM CST)      Pharmacy    [id number] - [place], [place] - [address] [phone], [phone]

## 2024-03-09 NOTE — TELEPHONE ENCOUNTER
Protocol Failed/ No Protocol    Requested Prescriptions   Pending Prescriptions Disp Refills    METFORMIN HCL 1000 MG Oral Tab [Pharmacy Med Name: METFORMIN 1000MG TABLETS] 180 tablet 0     Sig: TAKE 1 TABLET(1000 MG) BY MOUTH TWICE DAILY WITH MEALS       Diabetes Medication Protocol Failed - 3/8/2024  5:52 AM        Failed - Last A1C < 7.5 and within past 6 months     Lab Results   Component Value Date    A1C 12.2 (H) 11/29/2022             Failed - Microalbumin procedure in past 12 months or taking ACE/ARB        Failed - EGFRCR or GFRNAA > 50     GFR Evaluation            Failed - GFR in the past 12 months        Passed - In person appointment or virtual visit in the past 6 mos or appointment in next 3 mos     Recent Outpatient Visits              1 month ago Mucinous neoplasm of pancreas    [facility], Bahman Sampson MD    Office Visit    2 months ago Type 2 diabetes mellitus without complication, without long-term current use of insulin ([facility])    [facility] [place] Vukomanovic, Emela, MD    Telemedicine    1 year ago Type 2 diabetes mellitus without complication, without long-term current use of insulin ([facility])    [facility] [place] Vukomanovic, Emela, MD    Office Visit    1 year ago Diabetes mellitus type 2 without retinopathy ([facility])    [facility] [place] Vukomanovic, Emela, MD    Office Visit    1 year ago Mucinous neoplasm of pancreas    [facility] Bahman Sampson MD    Office Visit                             Recent Outpatient Visits              1 month ago Mucinous neoplasm of pancreas    [facility] Bahman Sampson MD    Office Visit    2 months ago Type 2 diabetes mellitus without complication, without long-term current use of insulin ([facility])    [facility],  [facility], [place] Vukomanovic, Emela, MD    Telemedicine    1 year ago Type 2 diabetes mellitus without complication, without long-term current use of insulin ([facility])    [facility], [facility], [place] Vukomanovic, Emela, MD    Office Visit    1 year ago Diabetes mellitus type 2 without retinopathy ([facility])    [facility], [facility], [place] Vukomanovic, Emela, MD    Office Visit    1 year ago Mucinous neoplasm of pancreas    [facility], [facility] Bahman Torres MD    Office Visit

## 2024-03-11 NOTE — TELEPHONE ENCOUNTER
Refusal reason: Prescriber not at this practice (pt moved to [place] nor my pt - please remuve pt from our list)

## (undated) DIAGNOSIS — E11.9 TYPE 2 DIABETES MELLITUS WITHOUT COMPLICATION, WITHOUT LONG-TERM CURRENT USE OF INSULIN (HCC): ICD-10-CM

## (undated) DIAGNOSIS — K21.9 GASTROESOPHAGEAL REFLUX DISEASE WITHOUT ESOPHAGITIS: ICD-10-CM

## (undated) DEVICE — Device

## (undated) DEVICE — ROBOTIC: Brand: MEDLINE INDUSTRIES, INC.

## (undated) DEVICE — STAPLER 60: Brand: SUREFORM

## (undated) DEVICE — NON-ADHERENT PAD PREPACK: Brand: TELFA

## (undated) DEVICE — DISPOSABLE LAPAROSCOPIC CLIP APPLIER WITH 20 CLIPS.: Brand: EPIX® UNIVERSAL CLIP APPLIER

## (undated) DEVICE — SUCTION IRRIGATOR: Brand: ENDOWRIST

## (undated) DEVICE — AIRSEAL 5 MM ACCESS PORT AND LOW PROFILE OBTURATOR WITH BLADELESS OPTICAL TIP, 120 MM LENGTH: Brand: AIRSEAL

## (undated) DEVICE — TOWEL SURG OR 17X30IN BLUE

## (undated) DEVICE — STAPLER 60 RELOAD GREEN: Brand: SUREFORM

## (undated) DEVICE — DRAIN RESERVOIR RELIAVAC 100CC

## (undated) DEVICE — COLUMN DRAPE

## (undated) DEVICE — LAPAROVUE VISIBILITY SYSTEM LAPAROSCOPIC SOLUTIONS: Brand: LAPAROVUE

## (undated) DEVICE — SPONGE LAP 18X18IN 7IN LOOP

## (undated) DEVICE — LAWSON - GOWN SURG STD XL STL DISP

## (undated) DEVICE — ECHOTIP ULTRA: Brand: ECHOTIP

## (undated) DEVICE — MEGA SUTURECUT ND: Brand: ENDOWRIST

## (undated) DEVICE — SUTURE SILK 3-0 SH

## (undated) DEVICE — ENDOSCOPIC ULTRASOUND ASPIRATION NEEDLE: Brand: EXPECT SLIMLINE SL

## (undated) DEVICE — BLAKE SILICONE DRAIN, 15 FR ROUND, HUBLESS WITH 3/16" TROCAR: Brand: BLAKE

## (undated) DEVICE — ENDOSCOPIC ULTRASOUND FINE NEEDLE BIOPSY (FNB) DEVICE: Brand: ACQUIRE

## (undated) DEVICE — DEV BIOP 25GA STRL DISP ENDO

## (undated) DEVICE — ENDOPATH ECHELON ENDOSCOPIC LINEAR CUTTER RELOADS, WHITE, 60MM: Brand: ECHELON ENDOPATH

## (undated) DEVICE — BLADELESS OBTURATOR: Brand: WECK VISTA

## (undated) DEVICE — 35 ML SYRINGE REGULAR TIP: Brand: MONOJECT

## (undated) DEVICE — PLUMEPORT ACTIV LAPAROSCOPIC SMOKE FILTRATION DEVICE: Brand: PLUMEPORT ACTIVE

## (undated) DEVICE — TRI-LUMEN FILTERED TUBE SET WITH ACTIVATED CHARCOAL FILTER: Brand: AIRSEAL

## (undated) DEVICE — SUTURE PROLENE 4-0 RB-1

## (undated) DEVICE — ANCHOR TISSUE RETRIEVAL SYSTEM, BAG SIZE 175 ML, PORT SIZE 10 MM: Brand: ANCHOR TISSUE RETRIEVAL SYSTEM

## (undated) DEVICE — CHLORAPREP 26ML APPLICATOR

## (undated) DEVICE — ABSORBABLE HEMOSTAT (OXIDIZED REGENERATED CELLULOSE, U.S.P.): Brand: SURGICEL

## (undated) DEVICE — FENESTRATED BIPOLAR FORCEPS: Brand: ENDOWRIST

## (undated) DEVICE — INSULATED BLADE ELECTRODE 6.5

## (undated) DEVICE — DRAPE SLUSH/WARMER W/DISC

## (undated) DEVICE — ECHELON FLEX POWERED PLUS COMPACT ARTICULATING ENDOSCOPIC LINEAR CUTTER, 60MM: Brand: ECHELON FLEX

## (undated) DEVICE — SOL  .9 1000ML BTL

## (undated) DEVICE — SUTURE VICRYL 0 J906G

## (undated) DEVICE — CANNULA SEAL

## (undated) DEVICE — ENDOPATH 5MM ENDOSCOPIC BLUNT TIP DISSECTORS (12 POUCHES CONTAINING 3 DISSECTORS EACH): Brand: ENDOPATH

## (undated) DEVICE — VESSEL SEALER EXTEND: Brand: ENDOWRIST

## (undated) DEVICE — ENDOPATH ECHELON ENDOSCOPIC LINEAR CUTTER RELOADS, BLACK, 60MM: Brand: ECHELON ENDOPATH

## (undated) DEVICE — CLIP HEMOLOK LARGE PURPLE

## (undated) DEVICE — 40580 - THE PINK PAD - ADVANCED TRENDELENBURG POSITIONING KIT: Brand: 40580 - THE PINK PAD - ADVANCED TRENDELENBURG POSITIONING KIT

## (undated) DEVICE — DRAPE SHEET LAPCHOLE 124X100X7

## (undated) DEVICE — GOWN SURG AERO BLUE PERF XLG

## (undated) DEVICE — SOL  .9 1000ML BAG

## (undated) DEVICE — CONMED SCOPE SAVER BITE BLOCK, 20X27 MM: Brand: SCOPE SAVER

## (undated) DEVICE — SUTURE SILK 2-0 SH

## (undated) DEVICE — LINE MNTR ADLT SET O2 INTMD

## (undated) DEVICE — CADIERE FORCEPS: Brand: ENDOWRIST

## (undated) DEVICE — SUTURE ETHILON 3-0 PS-2

## (undated) DEVICE — SOLUTION ENDOSCOPIC ANTI-FOG NON-TOXIC NON-ABRASIVE 6 CUBIC CENTIMETER WITH RADIOPAQUE ADHESIVE-BACKED SPONGE STERILE NOT MADE WITH NATURAL RUBBER LATEX MEDICHOICE: Brand: MEDICHOICE

## (undated) DEVICE — SUTURE SILK 2-0 SA85H

## (undated) DEVICE — Device: Brand: DEFENDO AIR/WATER/SUCTION AND BIOPSY VALVE

## (undated) DEVICE — SUTURE VLOC 90 3-0 9\" 2044

## (undated) DEVICE — FLEXIBLE YANKAUER,MEDIUM TIP, NO VACUUM CONTROL: Brand: ARGYLE

## (undated) DEVICE — PROVIDES A STERILE INTERFACE BETWEEN THE OPERATING ROOM SURGICAL LAMPS (NON-STERILE) AND THE SURGEON OR NURSE (STERILE).: Brand: STERION®CLAMP COVER FABRIC

## (undated) DEVICE — SUTURE PDS II 1-0 Z881G

## (undated) DEVICE — PROGRASP FORCEPS: Brand: ENDOWRIST

## (undated) DEVICE — STERILE ULTRASOUND GEL, SAFEWRAP: Brand: ECOVUE

## (undated) DEVICE — FORCEP RADIAL JAW 4

## (undated) DEVICE — SUTURE ETHIBOND EXCEL 2-0 SH

## (undated) DEVICE — SUTURE PROLENE 2-0 SH

## (undated) DEVICE — CLIP LG INTNL HMCLP TNTLM ESCP

## (undated) DEVICE — GAMMEX® PI HYBRID SIZE 7, STERILE POWDER-FREE SURGICAL GLOVE, POLYISOPRENE AND NEOPRENE BLEND: Brand: GAMMEX

## (undated) DEVICE — PROXIMATE SKIN STAPLERS (35 WIDE) CONTAINS 35 STAINLESS STEEL STAPLES (FIXED HEAD): Brand: PROXIMATE

## (undated) DEVICE — COVER STND 54X23IN MAYO REINF

## (undated) DEVICE — SUTURE PASSOR WITH GUIDE

## (undated) DEVICE — DISPOSABLE GRASPER: Brand: EPIX LAPAROSCOPIC GRASPER

## (undated) DEVICE — TAPE UMBILICAL U11T

## (undated) DEVICE — UNDYED BRAIDED (POLYGLACTIN 910), SYNTHETIC ABSORBABLE SUTURE: Brand: COATED VICRYL

## (undated) DEVICE — ARM DRAPE

## (undated) DEVICE — 5 MM BABCOCKS WITH RATCHET HANDLES: Brand: ENDOPATH

## (undated) DEVICE — ELECTRO LUBE IS A SINGLE PATIENT USE DEVICE THAT IS INTENDED TO BE USED ON ELECTROSURGICAL ELECTRODES TO REDUCE STICKING.: Brand: KEY SURGICAL ELECTRO LUBE

## (undated) DEVICE — STAPLER 60 RELOAD WHITE: Brand: SUREFORM

## (undated) DEVICE — Device: Brand: CUSTOM PROCEDURE KIT

## (undated) DEVICE — TIP COVER ACCESSORY

## (undated) NOTE — LETTER
01/25/22        [address] [facility]      Dear Kevon Cushing,    Our records indicate that you have outstanding lab work and or testing that was ordered for you and has not yet been completed:  Orders Placed This E

## (undated) NOTE — LETTER
[facility]  [address] [place] [id number]  PH: [phone]  FAX: [phone] [facility] [address] Erik Torres MD  [address] [place] [address]  Via In Basket    The patient listed below is sche

## (undated) NOTE — LETTER
08/10/21        [facility] [address] [facility]      Dear [facility],    [facility] records indicate that you have outstanding lab work and or testing that was ordered for you and has not yet been completed:  Orders Placed This E

## (undated) NOTE — LETTER
01/11/22        [address] [facility]      Dear Jolaine Nyhan,    Our records indicate that you have outstanding lab work and or testing that was ordered for you and has not yet been completed:  Orders Placed This E

## (undated) NOTE — Clinical Note
Please schedule patient at the [facility] once approved.  Please provide her with doctors of physical therapy information

## (undated) NOTE — LETTER
11/11/21        [address] [place] [address] [address] [facility]      Dear Renetta Driscoll records indicate that you have outstanding lab work and or testing that was ordered for you and has not yet been completed:  Orders Placed This E